# Patient Record
Sex: FEMALE | Race: BLACK OR AFRICAN AMERICAN | Employment: FULL TIME | URBAN - METROPOLITAN AREA
[De-identification: names, ages, dates, MRNs, and addresses within clinical notes are randomized per-mention and may not be internally consistent; named-entity substitution may affect disease eponyms.]

---

## 2017-08-14 PROBLEM — Z98.84 GASTRIC BYPASS STATUS FOR OBESITY: Status: ACTIVE | Noted: 2017-08-14

## 2018-08-12 ENCOUNTER — HOSPITAL ENCOUNTER (OUTPATIENT)
Age: 38
Discharge: HOME | End: 2018-08-12
Payer: COMMERCIAL

## 2018-08-12 DIAGNOSIS — K91.2 MALNUTRITION FOLLOWING GASTROINTESTINAL SURGERY: ICD-10-CM

## 2018-08-12 LAB
ALBUMIN SERPL-MCNC: 4.4 G/DL (ref 3.5–5.2)
ALP BLD-CCNC: 135 U/L (ref 35–104)
ALT SERPL-CCNC: 28 U/L (ref 0–32)
ANION GAP SERPL CALCULATED.3IONS-SCNC: 9 MMOL/L (ref 7–16)
AST SERPL-CCNC: 21 U/L (ref 0–31)
BILIRUB SERPL-MCNC: 0.4 MG/DL (ref 0–1.2)
BUN BLDV-MCNC: 11 MG/DL (ref 6–20)
CALCIUM SERPL-MCNC: 9.1 MG/DL (ref 8.6–10.2)
CHLORIDE BLD-SCNC: 108 MMOL/L (ref 98–107)
CHOLESTEROL, TOTAL: 145 MG/DL (ref 0–199)
CO2: 25 MMOL/L (ref 22–29)
CREAT SERPL-MCNC: 0.9 MG/DL (ref 0.5–1)
FERRITIN: 237 NG/ML
FOLATE: 3.7 NG/ML (ref 4.8–24.2)
GFR AFRICAN AMERICAN: >60
GFR NON-AFRICAN AMERICAN: >60 ML/MIN/1.73
GLUCOSE BLD-MCNC: 79 MG/DL (ref 74–109)
HCT VFR BLD CALC: 40.9 % (ref 34–48)
HEMOGLOBIN: 13.8 G/DL (ref 11.5–15.5)
MCH RBC QN AUTO: 29.6 PG (ref 26–35)
MCHC RBC AUTO-ENTMCNC: 33.7 % (ref 32–34.5)
MCV RBC AUTO: 87.8 FL (ref 80–99.9)
PDW BLD-RTO: 12.3 FL (ref 11.5–15)
PLATELET # BLD: 270 E9/L (ref 130–450)
PMV BLD AUTO: 9.9 FL (ref 7–12)
POTASSIUM SERPL-SCNC: 4.5 MMOL/L (ref 3.5–5)
RBC # BLD: 4.66 E12/L (ref 3.5–5.5)
SODIUM BLD-SCNC: 142 MMOL/L (ref 132–146)
TOTAL PROTEIN: 7.4 G/DL (ref 6.4–8.3)
TRIGL SERPL-MCNC: 57 MG/DL (ref 0–149)
VITAMIN B-12: 285 PG/ML (ref 211–946)
VITAMIN D 25-HYDROXY: 22 NG/ML (ref 30–100)
WBC # BLD: 3.4 E9/L (ref 4.5–11.5)

## 2018-08-12 PROCEDURE — 36415 COLL VENOUS BLD VENIPUNCTURE: CPT

## 2018-08-12 PROCEDURE — 82306 VITAMIN D 25 HYDROXY: CPT

## 2018-08-12 PROCEDURE — 84425 ASSAY OF VITAMIN B-1: CPT

## 2018-08-12 PROCEDURE — 82746 ASSAY OF FOLIC ACID SERUM: CPT

## 2018-08-12 PROCEDURE — 82728 ASSAY OF FERRITIN: CPT

## 2018-08-12 PROCEDURE — 82607 VITAMIN B-12: CPT

## 2018-08-12 PROCEDURE — 85027 COMPLETE CBC AUTOMATED: CPT

## 2018-08-12 PROCEDURE — 84478 ASSAY OF TRIGLYCERIDES: CPT

## 2018-08-12 PROCEDURE — 80053 COMPREHEN METABOLIC PANEL: CPT

## 2018-08-12 PROCEDURE — 82465 ASSAY BLD/SERUM CHOLESTEROL: CPT

## 2018-08-12 PROCEDURE — 84134 ASSAY OF PREALBUMIN: CPT

## 2018-08-12 PROCEDURE — 84630 ASSAY OF ZINC: CPT

## 2018-08-13 LAB — PREALBUMIN: 25 MG/DL (ref 20–40)

## 2018-08-15 ENCOUNTER — INITIAL CONSULT (OUTPATIENT)
Dept: BARIATRICS/WEIGHT MGMT | Age: 38
End: 2018-08-15
Payer: COMMERCIAL

## 2018-08-15 ENCOUNTER — OFFICE VISIT (OUTPATIENT)
Dept: BARIATRICS/WEIGHT MGMT | Age: 38
End: 2018-08-15
Payer: COMMERCIAL

## 2018-08-15 VITALS
BODY MASS INDEX: 37.9 KG/M2 | WEIGHT: 222 LBS | HEIGHT: 64 IN | TEMPERATURE: 98.6 F | HEART RATE: 84 BPM | RESPIRATION RATE: 20 BRPM | SYSTOLIC BLOOD PRESSURE: 148 MMHG | DIASTOLIC BLOOD PRESSURE: 94 MMHG

## 2018-08-15 VITALS
DIASTOLIC BLOOD PRESSURE: 94 MMHG | WEIGHT: 222 LBS | HEIGHT: 64 IN | RESPIRATION RATE: 20 BRPM | SYSTOLIC BLOOD PRESSURE: 148 MMHG | HEART RATE: 84 BPM | TEMPERATURE: 98.6 F | BODY MASS INDEX: 37.9 KG/M2

## 2018-08-15 DIAGNOSIS — K91.2 MALNUTRITION FOLLOWING GASTROINTESTINAL SURGERY: Primary | ICD-10-CM

## 2018-08-15 DIAGNOSIS — Z71.3 DIETARY COUNSELING: Primary | ICD-10-CM

## 2018-08-15 DIAGNOSIS — E55.9 VITAMIN D DEFICIENCY: ICD-10-CM

## 2018-08-15 LAB — ZINC: 73 UG/DL (ref 60–120)

## 2018-08-15 PROCEDURE — G8427 DOCREV CUR MEDS BY ELIG CLIN: HCPCS | Performed by: SURGERY

## 2018-08-15 PROCEDURE — 99999 PR OFFICE/OUTPT VISIT,PROCEDURE ONLY: CPT | Performed by: SURGERY

## 2018-08-15 PROCEDURE — 1036F TOBACCO NON-USER: CPT | Performed by: SURGERY

## 2018-08-15 PROCEDURE — 99214 OFFICE O/P EST MOD 30 MIN: CPT | Performed by: SURGERY

## 2018-08-15 PROCEDURE — 99212 OFFICE O/P EST SF 10 MIN: CPT

## 2018-08-15 PROCEDURE — G8417 CALC BMI ABV UP PARAM F/U: HCPCS | Performed by: SURGERY

## 2018-08-15 NOTE — PROGRESS NOTES
Judithann Dance - 1980  - 8/12/18 - Vitamin B1 and Zinc pending - Vitamin d 22L, Folate 3.7L    Note:  Patient was seen by Dr. [facility] on 8/15/18 and was diagnosed with a Vitamin D Deficiency. Patient stated he / she was taking the following supplements - Bariatric Advantage Multi-Vitamin 1 tablet daily pt instructed to take 1 tablet 2 times Daily, Bariatric Advantage 29 mgs Iron 1 tablet Daily, pt is not taking a calcium supplement, Dry Vitamin D2 50,000iu twice a week. The patient was instructed  that he / she needs to discontinue his / her current supplements and start the Bariatric approved Multi-Vitamins, Bariatric approved Iron, Vitamin D3 5,000iu daily and the Bariatric approved Calcium supplements and take the correct dose - pt scheduled with Dr. Edvin Florez for deficieny treatment. Patient was also  provided a list of correct Bariatric Multi-Vitamin's, Bariatric approved Iron, Vitamin D3 5,000iu daily and the correct Bariatric Calcium supplements with correct dosing. Thanks for allowing us to participate in your patient's care.   Dr. [facility]  [facility]  Phone: [phone]  Fax: [phone]  Faxed Via Epic to PCP

## 2018-08-15 NOTE — PATIENT INSTRUCTIONS
Vitamin D2 50,000iu twice a week. The patient was instructed  that he / she needs to discontinue his / her current supplements and start the Bariatric approved Multi-Vitamins, Bariatric approved Iron, Vitamin D3 5,000iu daily and the Bariatric approved Calcium supplements and take the correct dose - pt scheduled with Dr. Giancarlo Jerez for deficieny treatment. Patient was also  provided a list of correct Bariatric Multi-Vitamin's, Bariatric approved Iron, Vitamin D3 5,000iu daily and the correct Bariatric Calcium supplements with correct dosing. Thanks for allowing us to participate in your patient's care. Dr. Haydee Sánchez and [facility]  Phone: [phone]  Fax: [phone]  Faxed Via Epic to PCP        Vitamin and Supplement Instruction:  Vitamin and Mineral Supplementation   After Gastric Bypass and Sleeve Gastrectomy Surgery      Patient is able to verbalize the above supplements must be changed or added to the diet in order to insure proper health and nutrition, and prevent nutrient deficiencies. The patient is aware that not complying can lead to the patient placing him/her self at risk for complications. RD / LD reviewed with patient the importance of dietary supplements. Pt. verbalized understanding. Vitamins: Please select one and  take the following supplements and correct dose daily listed below     OptiSource chewable vitamin and mineral supplement, 1 tablet 4 times a day. ([facility])    Bariatric Fusion  1 tablet 4 times daily ( BariatricFusion. [employer] or [phone])    Celebrate Chewable Multi-Vitamin (1 tablet 2 times daily)  and  Celebrate Iron  30 mg +  Vitamin C (1 tablet daily)    Bariatric Advantage Chewable Multi-Formula (1 tablet 2 times daily)  and  Bariatric Advantage Chewable Iron 29 mg (1 tablet daily)    Other Vitamin Deficiencies: Please take the following supplements listed below      Vitamin D  Dry Vitamin D3 5,000iu every day.  -

## 2018-08-15 NOTE — PATIENT INSTRUCTIONS
Please continue to take your vitamin and mineral supplements as instructed. If you received a blood work prescription today for laboratory monitoring due prior to your next routine follow-up visit, please have this blood work obtained 10 to 14 days prior to your next visit. It is important to fast for 12 hours prior to routine weight loss surgery blood work, EXCEPT for drinking water, to ensure accuracy of results. Please report nausea, vomiting, abdominal pain, or any other problems you experience to your surgeon. For problems related to weight loss surgery, it is best to go to [address] [place] [address] Emergency Department and have your surgeon paged.

## 2018-08-17 LAB — VITAMIN B1 WHOLE BLOOD: 88 NMOL/L (ref 70–180)

## 2019-03-13 ENCOUNTER — TELEPHONE (OUTPATIENT)
Dept: BARIATRICS/WEIGHT MGMT | Age: 39
End: 2019-03-13

## 2019-03-19 ENCOUNTER — HOSPITAL ENCOUNTER (EMERGENCY)
Age: 39
Discharge: HOME | End: 2019-03-19
Attending: EMERGENCY MEDICINE
Payer: COMMERCIAL

## 2019-03-19 VITALS
RESPIRATION RATE: 18 BRPM | BODY MASS INDEX: 37.76 KG/M2 | TEMPERATURE: 98.1 F | DIASTOLIC BLOOD PRESSURE: 97 MMHG | SYSTOLIC BLOOD PRESSURE: 140 MMHG | WEIGHT: 220 LBS | HEART RATE: 80 BPM | OXYGEN SATURATION: 96 %

## 2019-03-19 DIAGNOSIS — L03.113 CELLULITIS OF RIGHT UPPER EXTREMITY: Primary | ICD-10-CM

## 2019-03-19 PROCEDURE — 99283 EMERGENCY DEPT VISIT LOW MDM: CPT

## 2019-03-19 RX ORDER — METHYLPREDNISOLONE 4 MG/1
TABLET ORAL
Qty: 1 KIT | Refills: 0 | Status: SHIPPED | OUTPATIENT
Start: 2019-03-19 | End: 2019-03-25

## 2019-03-19 RX ORDER — DOXYCYCLINE 100 MG/1
100 CAPSULE ORAL 2 TIMES DAILY
Qty: 20 CAPSULE | Refills: 0 | Status: SHIPPED | OUTPATIENT
Start: 2019-03-19 | End: 2019-07-11

## 2019-04-12 ENCOUNTER — HOSPITAL ENCOUNTER (OUTPATIENT)
Age: 39
Discharge: HOME | End: 2019-04-12
Payer: COMMERCIAL

## 2019-04-12 DIAGNOSIS — K91.2 MALNUTRITION FOLLOWING GASTROINTESTINAL SURGERY: ICD-10-CM

## 2019-04-12 LAB
ALBUMIN SERPL-MCNC: 4 G/DL (ref 3.5–5.2)
ALP BLD-CCNC: 129 U/L (ref 35–104)
ALT SERPL-CCNC: 19 U/L (ref 0–32)
ANION GAP SERPL CALCULATED.3IONS-SCNC: 10 MMOL/L (ref 7–16)
AST SERPL-CCNC: 18 U/L (ref 0–31)
BILIRUB SERPL-MCNC: 0.4 MG/DL (ref 0–1.2)
BUN BLDV-MCNC: 11 MG/DL (ref 6–20)
CALCIUM SERPL-MCNC: 8.7 MG/DL (ref 8.6–10.2)
CHLORIDE BLD-SCNC: 105 MMOL/L (ref 98–107)
CHOLESTEROL, TOTAL: 144 MG/DL (ref 0–199)
CO2: 25 MMOL/L (ref 22–29)
CREAT SERPL-MCNC: 0.7 MG/DL (ref 0.5–1)
FERRITIN: 199 NG/ML
FOLATE: >20 NG/ML (ref 4.8–24.2)
GFR AFRICAN AMERICAN: >60
GFR NON-AFRICAN AMERICAN: >60 ML/MIN/1.73
GLUCOSE BLD-MCNC: 79 MG/DL (ref 74–99)
HCT VFR BLD CALC: 37.4 % (ref 34–48)
HEMOGLOBIN: 12.5 G/DL (ref 11.5–15.5)
MCH RBC QN AUTO: 29.1 PG (ref 26–35)
MCHC RBC AUTO-ENTMCNC: 33.4 % (ref 32–34.5)
MCV RBC AUTO: 87.2 FL (ref 80–99.9)
PDW BLD-RTO: 11.6 FL (ref 11.5–15)
PLATELET # BLD: 298 E9/L (ref 130–450)
PMV BLD AUTO: 9.4 FL (ref 7–12)
POTASSIUM SERPL-SCNC: 3.9 MMOL/L (ref 3.5–5)
PREALBUMIN: 29 MG/DL (ref 20–40)
RBC # BLD: 4.29 E12/L (ref 3.5–5.5)
SODIUM BLD-SCNC: 140 MMOL/L (ref 132–146)
TOTAL PROTEIN: 6.6 G/DL (ref 6.4–8.3)
TRIGL SERPL-MCNC: 53 MG/DL (ref 0–149)
VITAMIN B-12: 285 PG/ML (ref 211–946)
VITAMIN D 25-HYDROXY: 14 NG/ML (ref 30–100)
WBC # BLD: 4.1 E9/L (ref 4.5–11.5)

## 2019-04-12 PROCEDURE — 80053 COMPREHEN METABOLIC PANEL: CPT

## 2019-04-12 PROCEDURE — 82746 ASSAY OF FOLIC ACID SERUM: CPT

## 2019-04-12 PROCEDURE — 82728 ASSAY OF FERRITIN: CPT

## 2019-04-12 PROCEDURE — 84630 ASSAY OF ZINC: CPT

## 2019-04-12 PROCEDURE — 84425 ASSAY OF VITAMIN B-1: CPT

## 2019-04-12 PROCEDURE — 85027 COMPLETE CBC AUTOMATED: CPT

## 2019-04-12 PROCEDURE — 36415 COLL VENOUS BLD VENIPUNCTURE: CPT

## 2019-04-12 PROCEDURE — 82306 VITAMIN D 25 HYDROXY: CPT

## 2019-04-12 PROCEDURE — 82465 ASSAY BLD/SERUM CHOLESTEROL: CPT

## 2019-04-12 PROCEDURE — 82607 VITAMIN B-12: CPT

## 2019-04-12 PROCEDURE — 84478 ASSAY OF TRIGLYCERIDES: CPT

## 2019-04-12 PROCEDURE — 84134 ASSAY OF PREALBUMIN: CPT

## 2019-04-17 LAB — ZINC: 77 UG/DL (ref 60–120)

## 2019-04-18 LAB — VITAMIN B1 WHOLE BLOOD: 81 NMOL/L (ref 70–180)

## 2019-05-01 ENCOUNTER — OFFICE VISIT (OUTPATIENT)
Dept: BARIATRICS/WEIGHT MGMT | Age: 39
End: 2019-05-01
Payer: COMMERCIAL

## 2019-05-01 VITALS
SYSTOLIC BLOOD PRESSURE: 188 MMHG | WEIGHT: 233 LBS | HEIGHT: 64 IN | TEMPERATURE: 97.5 F | HEART RATE: 74 BPM | BODY MASS INDEX: 39.78 KG/M2 | RESPIRATION RATE: 20 BRPM | DIASTOLIC BLOOD PRESSURE: 97 MMHG

## 2019-05-01 DIAGNOSIS — R10.13 EPIGASTRIC PAIN: Primary | ICD-10-CM

## 2019-05-01 PROCEDURE — 99211 OFF/OP EST MAY X REQ PHY/QHP: CPT

## 2019-05-01 PROCEDURE — 1036F TOBACCO NON-USER: CPT | Performed by: SURGERY

## 2019-05-01 PROCEDURE — G8417 CALC BMI ABV UP PARAM F/U: HCPCS | Performed by: SURGERY

## 2019-05-01 PROCEDURE — 99213 OFFICE O/P EST LOW 20 MIN: CPT | Performed by: SURGERY

## 2019-05-01 PROCEDURE — G8427 DOCREV CUR MEDS BY ELIG CLIN: HCPCS | Performed by: SURGERY

## 2019-05-01 RX ORDER — FLUTICASONE PROPIONATE 50 MCG
SPRAY, SUSPENSION (ML) NASAL PRN
Refills: 6 | COMMUNITY
Start: 2019-03-04

## 2019-05-01 RX ORDER — LORATADINE/PSEUDOEPHEDRINE 10MG-240MG
TABLET, EXTENDED RELEASE 24 HR ORAL
Refills: 0 | COMMUNITY
Start: 2019-03-20 | End: 2021-03-09

## 2019-05-01 NOTE — PROGRESS NOTES
Sigifredo Median  5/1/2019  [address]     Shannon-en- Y Gastric Bypass  1.5 Year Post-Operative Follow-up     Sigifredo Briseno is a 43 y.o. female who is 1.5 years post Laparoscopic Shannon-en-Y Gastric Bypass surgery. Reports no issues. She is not having swallowing difficulty, is noncompliant some of the time with the multivitamins and calcium + Vit D. She is meeting fluid recommendations of at least 64 ounces per day and is meeting protein recommendations. She  is not exercising: no regular exercise. Weight=233 lb (105.7 kg)  Today's weight represents a total weight loss of 82 pounds since surgery with 19 pounds regained since the lowest weight. Prior to Admission medications    Medication Sig Start Date End Date Taking?  Authorizing Provider   LORATADINE-D 24HR  MG per extended release tablet TAKE ONE TABLET BY MOUTH EVERY DAY AS NEEDED 3/20/19  Yes Historical Provider, MD   fluticasone (FLONASE) 50 MCG/ACT nasal spray as needed 3/4/19  Yes Historical Provider, MD   doxycycline monohydrate (MONODOX) 100 MG capsule Take 1 capsule by mouth 2 times daily 3/19/19  Yes Anthony Hobson MD   Multiple Vitamins-Minerals (THERAPEUTIC MULTIVITAMIN-MINERALS) tablet Take 1 tablet by mouth 2 times daily Indications: bariatric advantage   Yes Historical Provider, MD   Calcium-Iron-Vit D-Vit K (CALCIUM SOFT CHEWS) [id number] MG-UNT-MCG CHEW Take by mouth 3 times daily   Yes Historical Provider, MD   diclofenac (CATAFLAM) 50 MG tablet Take 50 mg by mouth 3 times daily   Yes Historical Provider, MD   acetaminophen (APAP EXTRA STRENGTH) 500 MG tablet Take 1 tablet by mouth every 6 hours as needed for Pain 5/31/15  Yes Arvind Lopez,    Cholecalciferol (VITAMIN D3) 5000 units TABS Take by mouth daily    Historical Provider, MD   omeprazole (PRILOSEC) 20 MG delayed release capsule Take 1 capsule by mouth Daily  Patient taking differently: Take 20 mg by mouth as needed  8/2/17 8/15/18 Ariela Rahman MD   Cholecalciferol (VITAMIN D3) 89491 UNITS CAPS Take by mouth Twice a Week     Historical Provider, MD          Physical exam:   BP (!) 188/97 (Site: Left Lower Arm, Position: Sitting, Cuff Size: Medium Adult)   Pulse 74   Temp 97.5 °F (36.4 °C) (Temporal)   Resp 20   Ht 5' 4\" (1.626 m)   Wt 233 lb (105.7 kg)   BMI 39.99 kg/m²    General appearance: alert, appears stated age and cooperative  Head: Normocephalic, without obvious abnormality, atraumatic  Neck: no adenopathy, no carotid bruit, no JVD, supple, symmetrical, trachea midline and thyroid not enlarged, symmetric, no tenderness/mass/nodules  Lungs: clear to auscultation bilaterally  Heart: regular rate and rhythm  Abdomen: soft, non-tender; bowel sounds normal; no masses,  no organomegaly  Extremities: extremities normal, atraumatic, no cyanosis or edema    Assessment: Post Shannon-en- Y Gastric Bypass. She does not complain of GERD,  does not have sleep apnea,  does not have diabetes,  does not have hypertension off medical treatment. Cholesterol and triglycerides are normal. Low vit d    Plan:  Get back on diet, supplement vit d. Continue to eat a high protein, low calorie diet, eat small portions very slowly and chew well before swallowing. Drink plenty of water and fluids. Make sure to use fiber to keep the bowels regular. Try to exercise 7 days per week, maintain adequate variety and balance. Always notify the clinic if you have any medical problems. Follow up in 6 months.       Physician Signature: Electronically signed by Dr. Ariela Rahman MD

## 2019-05-01 NOTE — PATIENT INSTRUCTIONS
Please continue to take your vitamin and mineral supplements as instructed. If you received a blood work prescription today for laboratory monitoring due prior to your next routine follow-up visit, please have this blood work obtained 10 to 14 days prior to your next visit. It is important to fast for 12 hours prior to routine weight loss surgery blood work, EXCEPT for drinking water, to ensure accuracy of results. Please report nausea, vomiting, abdominal pain, or any other problems you experience to your surgeon. For problems related to weight loss surgery, it is best to go to [address] [place] [address] Emergency Department and have your surgeon paged.

## 2019-07-11 ENCOUNTER — HOSPITAL ENCOUNTER (EMERGENCY)
Age: 39
Discharge: HOME | End: 2019-07-11
Attending: FAMILY MEDICINE
Payer: COMMERCIAL

## 2019-07-11 VITALS
HEART RATE: 86 BPM | BODY MASS INDEX: 38.62 KG/M2 | SYSTOLIC BLOOD PRESSURE: 152 MMHG | OXYGEN SATURATION: 97 % | RESPIRATION RATE: 16 BRPM | WEIGHT: 225 LBS | TEMPERATURE: 98.6 F | DIASTOLIC BLOOD PRESSURE: 100 MMHG

## 2019-07-11 DIAGNOSIS — M17.0 PRIMARY OSTEOARTHRITIS OF BOTH KNEES: Primary | ICD-10-CM

## 2019-07-11 PROCEDURE — 96372 THER/PROPH/DIAG INJ SC/IM: CPT

## 2019-07-11 PROCEDURE — 99282 EMERGENCY DEPT VISIT SF MDM: CPT

## 2019-07-11 PROCEDURE — 6360000002 HC RX W HCPCS: Performed by: FAMILY MEDICINE

## 2019-07-11 RX ORDER — DEXAMETHASONE SODIUM PHOSPHATE 10 MG/ML
10 INJECTION, SOLUTION INTRAMUSCULAR; INTRAVENOUS ONCE
Status: COMPLETED | OUTPATIENT
Start: 2019-07-11 | End: 2019-07-11

## 2019-07-11 RX ORDER — PREDNISONE 10 MG/1
20 TABLET ORAL 2 TIMES DAILY
Qty: 6 TABLET | Refills: 0 | Status: SHIPPED | OUTPATIENT
Start: 2019-07-12 | End: 2019-07-16

## 2019-07-11 RX ADMIN — DEXAMETHASONE SODIUM PHOSPHATE 10 MG: 10 INJECTION INTRAMUSCULAR; INTRAVENOUS at 16:08

## 2019-07-11 ASSESSMENT — PAIN DESCRIPTION - LOCATION: LOCATION: KNEE

## 2019-07-11 ASSESSMENT — PAIN SCALES - GENERAL: PAINLEVEL_OUTOF10: 10

## 2019-07-11 ASSESSMENT — PAIN DESCRIPTION - FREQUENCY: FREQUENCY: CONTINUOUS

## 2019-07-11 ASSESSMENT — PAIN DESCRIPTION - DESCRIPTORS: DESCRIPTORS: SHARP;THROBBING

## 2019-07-11 ASSESSMENT — PAIN DESCRIPTION - PROGRESSION
CLINICAL_PROGRESSION: NOT CHANGED
CLINICAL_PROGRESSION: NOT CHANGED

## 2019-07-11 ASSESSMENT — PAIN DESCRIPTION - PAIN TYPE: TYPE: ACUTE PAIN

## 2019-07-11 ASSESSMENT — PAIN DESCRIPTION - ORIENTATION: ORIENTATION: LEFT

## 2019-09-24 DIAGNOSIS — M25.562 PAIN IN BOTH KNEES, UNSPECIFIED CHRONICITY: Primary | ICD-10-CM

## 2019-09-24 DIAGNOSIS — M25.561 PAIN IN BOTH KNEES, UNSPECIFIED CHRONICITY: Primary | ICD-10-CM

## 2019-10-21 ENCOUNTER — OFFICE VISIT (OUTPATIENT)
Dept: ORTHOPEDIC SURGERY | Age: 39
End: 2019-10-21
Payer: COMMERCIAL

## 2019-10-21 ENCOUNTER — TELEPHONE (OUTPATIENT)
Dept: BARIATRICS/WEIGHT MGMT | Age: 39
End: 2019-10-21

## 2019-10-21 VITALS — BODY MASS INDEX: 38.43 KG/M2 | WEIGHT: 225.09 LBS | HEIGHT: 64 IN

## 2019-10-21 DIAGNOSIS — M25.562 LEFT KNEE PAIN, UNSPECIFIED CHRONICITY: ICD-10-CM

## 2019-10-21 DIAGNOSIS — M17.0 ARTHRITIS OF BOTH KNEES: ICD-10-CM

## 2019-10-21 DIAGNOSIS — M25.561 PAIN IN BOTH KNEES, UNSPECIFIED CHRONICITY: Primary | ICD-10-CM

## 2019-10-21 DIAGNOSIS — M25.562 PAIN IN BOTH KNEES, UNSPECIFIED CHRONICITY: Primary | ICD-10-CM

## 2019-10-21 PROCEDURE — 99203 OFFICE O/P NEW LOW 30 MIN: CPT | Performed by: ORTHOPAEDIC SURGERY

## 2019-10-21 PROCEDURE — G8427 DOCREV CUR MEDS BY ELIG CLIN: HCPCS | Performed by: ORTHOPAEDIC SURGERY

## 2019-10-21 PROCEDURE — G8484 FLU IMMUNIZE NO ADMIN: HCPCS | Performed by: ORTHOPAEDIC SURGERY

## 2019-10-21 PROCEDURE — G8417 CALC BMI ABV UP PARAM F/U: HCPCS | Performed by: ORTHOPAEDIC SURGERY

## 2019-10-21 PROCEDURE — 1036F TOBACCO NON-USER: CPT | Performed by: ORTHOPAEDIC SURGERY

## 2019-10-28 ENCOUNTER — TELEPHONE (OUTPATIENT)
Dept: BARIATRICS/WEIGHT MGMT | Age: 39
End: 2019-10-28

## 2019-11-14 ENCOUNTER — HOSPITAL ENCOUNTER (OUTPATIENT)
Age: 39
Discharge: HOME | End: 2019-11-14
Payer: COMMERCIAL

## 2019-11-14 DIAGNOSIS — R10.13 EPIGASTRIC PAIN: ICD-10-CM

## 2019-11-14 LAB
ALBUMIN SERPL-MCNC: 4.3 G/DL (ref 3.5–5.2)
ALP BLD-CCNC: 123 U/L (ref 35–104)
ALT SERPL-CCNC: 20 U/L (ref 0–32)
ANION GAP SERPL CALCULATED.3IONS-SCNC: 10 MMOL/L (ref 7–16)
AST SERPL-CCNC: 19 U/L (ref 0–31)
BILIRUB SERPL-MCNC: 0.4 MG/DL (ref 0–1.2)
BUN BLDV-MCNC: 12 MG/DL (ref 6–20)
CALCIUM SERPL-MCNC: 9.1 MG/DL (ref 8.6–10.2)
CHLORIDE BLD-SCNC: 107 MMOL/L (ref 98–107)
CHOLESTEROL, TOTAL: 151 MG/DL (ref 0–199)
CO2: 24 MMOL/L (ref 22–29)
CREAT SERPL-MCNC: 0.8 MG/DL (ref 0.5–1)
FERRITIN: 238 NG/ML
FOLATE: 13.5 NG/ML (ref 4.8–24.2)
GFR AFRICAN AMERICAN: >60
GFR NON-AFRICAN AMERICAN: >60 ML/MIN/1.73
GLUCOSE BLD-MCNC: 89 MG/DL (ref 74–99)
HCT VFR BLD CALC: 42.3 % (ref 34–48)
HEMOGLOBIN: 14.3 G/DL (ref 11.5–15.5)
MCH RBC QN AUTO: 29.4 PG (ref 26–35)
MCHC RBC AUTO-ENTMCNC: 33.8 % (ref 32–34.5)
MCV RBC AUTO: 86.9 FL (ref 80–99.9)
PDW BLD-RTO: 11.6 FL (ref 11.5–15)
PLATELET # BLD: 240 E9/L (ref 130–450)
PMV BLD AUTO: 10.4 FL (ref 7–12)
POTASSIUM SERPL-SCNC: 4 MMOL/L (ref 3.5–5)
PREALBUMIN: 32 MG/DL (ref 20–40)
RBC # BLD: 4.87 E12/L (ref 3.5–5.5)
SODIUM BLD-SCNC: 141 MMOL/L (ref 132–146)
TOTAL PROTEIN: 7.3 G/DL (ref 6.4–8.3)
TRIGL SERPL-MCNC: 61 MG/DL (ref 0–149)
VITAMIN B-12: 247 PG/ML (ref 211–946)
VITAMIN D 25-HYDROXY: 16 NG/ML (ref 30–100)
WBC # BLD: 6.2 E9/L (ref 4.5–11.5)

## 2019-11-14 PROCEDURE — 82746 ASSAY OF FOLIC ACID SERUM: CPT

## 2019-11-14 PROCEDURE — 80053 COMPREHEN METABOLIC PANEL: CPT

## 2019-11-14 PROCEDURE — 85027 COMPLETE CBC AUTOMATED: CPT

## 2019-11-14 PROCEDURE — 82465 ASSAY BLD/SERUM CHOLESTEROL: CPT

## 2019-11-14 PROCEDURE — 84478 ASSAY OF TRIGLYCERIDES: CPT

## 2019-11-14 PROCEDURE — 36415 COLL VENOUS BLD VENIPUNCTURE: CPT

## 2019-11-14 PROCEDURE — 84630 ASSAY OF ZINC: CPT

## 2019-11-14 PROCEDURE — 84425 ASSAY OF VITAMIN B-1: CPT

## 2019-11-14 PROCEDURE — 84134 ASSAY OF PREALBUMIN: CPT

## 2019-11-14 PROCEDURE — 82306 VITAMIN D 25 HYDROXY: CPT

## 2019-11-14 PROCEDURE — 82728 ASSAY OF FERRITIN: CPT

## 2019-11-14 PROCEDURE — 82607 VITAMIN B-12: CPT

## 2019-11-16 LAB — ZINC: 70.1 UG/DL (ref 60–120)

## 2019-11-17 LAB — VITAMIN B1 WHOLE BLOOD: 107 NMOL/L (ref 70–180)

## 2019-12-04 ENCOUNTER — TELEPHONE (OUTPATIENT)
Dept: BARIATRICS/WEIGHT MGMT | Age: 39
End: 2019-12-04

## 2020-02-03 ENCOUNTER — OFFICE VISIT (OUTPATIENT)
Dept: ORTHOPEDIC SURGERY | Age: 40
End: 2020-02-03
Payer: COMMERCIAL

## 2020-02-03 VITALS — HEIGHT: 64 IN | BODY MASS INDEX: 38.43 KG/M2 | WEIGHT: 225.09 LBS

## 2020-02-03 PROCEDURE — 1036F TOBACCO NON-USER: CPT | Performed by: ORTHOPAEDIC SURGERY

## 2020-02-03 PROCEDURE — G8417 CALC BMI ABV UP PARAM F/U: HCPCS | Performed by: ORTHOPAEDIC SURGERY

## 2020-02-03 PROCEDURE — G8427 DOCREV CUR MEDS BY ELIG CLIN: HCPCS | Performed by: ORTHOPAEDIC SURGERY

## 2020-02-03 PROCEDURE — 99214 OFFICE O/P EST MOD 30 MIN: CPT | Performed by: ORTHOPAEDIC SURGERY

## 2020-02-03 PROCEDURE — G8484 FLU IMMUNIZE NO ADMIN: HCPCS | Performed by: ORTHOPAEDIC SURGERY

## 2020-02-03 RX ORDER — ALBUTEROL SULFATE 90 UG/1
AEROSOL, METERED RESPIRATORY (INHALATION)
COMMUNITY
Start: 2020-01-12

## 2020-02-03 RX ORDER — TIZANIDINE 2 MG/1
TABLET ORAL
COMMUNITY
Start: 2020-01-12 | End: 2020-10-08

## 2020-02-03 RX ORDER — LISINOPRIL 5 MG/1
10 TABLET ORAL
COMMUNITY
Start: 2020-01-12 | End: 2021-03-09

## 2020-02-03 NOTE — PROGRESS NOTES
UPPER GASTROINTESTINAL ENDOSCOPY  08/12/2016    Dr. Taco Candelaria EXTRACTION         Allergies   Allergen Reactions    Ibuprofen Swelling       Social History     Socioeconomic History    Marital status: Single     Spouse name: None    Number of children: None    Years of education: None    Highest education level: None   Occupational History    None   Social Needs    Financial resource strain: None    Food insecurity:     Worry: None     Inability: None    Transportation needs:     Medical: None     Non-medical: None   Tobacco Use    Smoking status: Former Smoker     Types: Cigarettes    Smokeless tobacco: Never Used   Substance and Sexual Activity    Alcohol use: No    Drug use: No    Sexual activity: Yes     Partners: Male   Lifestyle    Physical activity:     Days per week: None     Minutes per session: None    Stress: None   Relationships    Social connections:     Talks on phone: None     Gets together: None     Attends Jainism service: None     Active member of club or organization: None     Attends meetings of clubs or organizations: None     Relationship status: None    Intimate partner violence:     Fear of current or ex partner: None     Emotionally abused: None     Physically abused: None     Forced sexual activity: None   Other Topics Concern    None   Social History Narrative    None       Review of Systems  As follows except as previously noted in HPI:  Constitutional: Negative for chills, diaphoresis, fatigue, fever and unexpected weight change. Respiratory: Negative for cough, shortness of breath and wheezing. Cardiovascular: Negative for chest pain and palpitations. Neurological: Negative for dizziness, syncope, cephalgia. GI / : negative  Musculoskeletal: see HPI       Objective:   Physical Exam   Constitutional: Oriented to person, place, and time. and appears well-developed and well-nourished. :   Head: Normocephalic and atraumatic.    Eyes: EOM are normal.   Neck: Neck supple. Cardiovascular: Normal rate and regular rhythm. Pulmonary/Chest: Effort normal. No stridor. No respiratory distress, no wheezes. Abdominal:  No abnormal distension. Neurological: Alert and oriented to person, place, and time. Skin: Skin is warm and dry. Psychiatric: Normal mood and affect.  Behavior is normal. Thought content normal.    CELESTINO Palomo, DO    2/3/20  4:38 PM

## 2020-02-05 ENCOUNTER — INITIAL CONSULT (OUTPATIENT)
Dept: BARIATRICS/WEIGHT MGMT | Age: 40
End: 2020-02-05
Payer: COMMERCIAL

## 2020-02-05 ENCOUNTER — OFFICE VISIT (OUTPATIENT)
Dept: BARIATRICS/WEIGHT MGMT | Age: 40
End: 2020-02-05
Payer: COMMERCIAL

## 2020-02-05 VITALS
DIASTOLIC BLOOD PRESSURE: 102 MMHG | WEIGHT: 241 LBS | TEMPERATURE: 97.6 F | HEART RATE: 83 BPM | SYSTOLIC BLOOD PRESSURE: 162 MMHG | BODY MASS INDEX: 41.15 KG/M2 | RESPIRATION RATE: 20 BRPM | HEIGHT: 64 IN

## 2020-02-05 PROCEDURE — 99213 OFFICE O/P EST LOW 20 MIN: CPT | Performed by: SURGERY

## 2020-02-05 PROCEDURE — G8417 CALC BMI ABV UP PARAM F/U: HCPCS | Performed by: SURGERY

## 2020-02-05 PROCEDURE — G8484 FLU IMMUNIZE NO ADMIN: HCPCS | Performed by: SURGERY

## 2020-02-05 PROCEDURE — G8427 DOCREV CUR MEDS BY ELIG CLIN: HCPCS | Performed by: SURGERY

## 2020-02-05 PROCEDURE — 99212 OFFICE O/P EST SF 10 MIN: CPT

## 2020-02-05 PROCEDURE — 1036F TOBACCO NON-USER: CPT | Performed by: SURGERY

## 2020-02-05 PROCEDURE — 99999 PR OFFICE/OUTPT VISIT,PROCEDURE ONLY: CPT | Performed by: DIETITIAN, REGISTERED

## 2020-02-05 NOTE — PATIENT INSTRUCTIONS
[facility]  Dietary Follow-up Appointment Instructions    Caterina Melissa  -Pt referred to Dr. Lydia Elizondo for Vitamin D Def tx  Date: 2/5/2020     The RD / LD reviewed the following instructions with the patient and handouts have been given. Pt. is able to verbalize instruction and has been instructed to call with any problems or complications. If patient is not able to comply with the dietary advancement or dietary or supplement instructions given pt. is instructed to call the [facility] at [phone]. If [facility] is closed and problems occur patient is instructed to go to [address] [place] [address] Emergency Department and have his / her surgeon paged. Caterina Melissa - 1980 4/12/19 - Vitamin D 14L, 11/14/19 - Vitamin D 16L    Note:  Patient was seen by Dr. Ela Henning on 2/5/20 and was diagnosed with a Vitamin D Deficiency. Patient stated he / she was taking the following supplements  - no bariatric supplements PCP for the last two years has prescribed Vitamin D2 50,000iu twice a week. The patient was instructed  that he / she needs to start the Bariatric approved Multi-Vitamins, Bariatric approved Iron, Vitamin D3 5,000iu daily and the Bariatric approved Calcium supplements - pt scheduled with Dr. Lydia Elizondo for deficieny treatment. Patient was also  provided a list of correct Bariatric Multi-Vitamin's, Bariatric approved Iron, Vitamin D3 5,000iu daily and the correct Bariatric Calcium supplements with correct dosing. Thanks for allowing us to participate in your patient's care. Dr. Ela Henning  [facility]  Phone: [phone]  Fax: [phone]  Faxed Via Epic to PCP      Chart given to Nomi Smyth to schedule the pt.      Current Diet Instruction: Handouts Given    Bariatric 1200 calorie diet, SAURABH, heart healthy, 60-80 gram protein - MVI, Calcium and Protein Supplements        Care Plan:  Vitamin and Mineral Supplementation self-synthesized with sunlight. Vitamin D is also found in fortified milk, fortified margarine, egg yolks, liver and fatty fish. What occurs when you are deficient in Vitamin D? Bones and Teeth:  Liliana Lauri can occur with Vitamin D deficiency, which is known as softening of the bones and teeth. Due to the softening effect we can see deformities of the limbs, spine, thorax and pelvis. Also seen is pain in the pelvis, lower back and legs, including increase risk to bone fractures. Blood:  Vitamin D deficiency can cause decreased calcium and/or phosphorus in the blood and increased alkaline phosphatase. Nervous/Muscular Systems:  Vitamin D deficiency can cause lax muscles resulting in protrusion of the abdomen (Pot Belly) and muscle spasms. Some patients complain of involuntary twitching and  muscle spasms. Excretory System:  Vitamin D deficiency can cause increased calcium in the stool and decreased calcium in the urine. Other:  Vitamin D deficiency can cause abnormally high secretions of parathormone, which is why we run lab work at 1 year to make sure the calcium supplements you are taking are being absorbed correctly. Vitamin D in Commonly Eaten Foods Ranked Richest to [employer]:      Food Serving Size IU of   Vitamin D----------        Herring, fresh, raw,  1 oz. 255 IU   [place] 1 oz. 142 IU   Milk,Cows Fortified 1 Cup 100 IU   Sardines, canned 1 oz. 85 IU   Chicken-Liver, Cooked 3oz. 45 IU   Shrimp, Canned 1oz. 30 IU   Egg Yolk 1 25 IU   Calf-Liver, Cooked 3oz. 12 IU   Cream, Light 1 T 8 IU   Cheddar Cheese 1 oz. 3 IU   Oysters 4 3 IU   Butter  1 tsp 1.4 IU            Unfortunately after RYGB surgery you will not be able to increase your Vitamin D with diet alone. A Vitamin D supplement will be needed in order to improve Vitamin D lab values.

## 2020-02-05 NOTE — PROGRESS NOTES
Blaze Daniels - 1980 4/12/19 - Vitamin D 14L, 11/14/19 - Vitamin D 16L    Note:  Patient was seen by Dr. Juan Grover on 2/5/20 and was diagnosed with a Vitamin D Deficiency. Patient stated he / she was taking the following supplements  - no bariatric supplements PCP for the last two years has prescribed Vitamin D2 50,000iu twice a week. The patient was instructed  that he / she needs to start the Bariatric approved Multi-Vitamins, Bariatric approved Iron, Vitamin D3 5,000iu daily and the Bariatric approved Calcium supplements - pt scheduled with Dr. Yulissa Bright for deficiency treatment. Patient was also  provided a list of correct Bariatric Multi-Vitamin's, Bariatric approved Iron, Vitamin D3 5,000iu daily and the correct Bariatric Calcium supplements with correct dosing. Thanks for allowing us to participate in your patient's care. Dr. Juan Grover  [facility]  Phone: [phone]  Fax: [phone]  Faxed Via Epic to PCP      Chart given to [facility] to schedule the pt.

## 2020-02-05 NOTE — PROGRESS NOTES
Robles Hurst  2/5/2020  Awanda Part    Shannon-en- Y Gastric Bypass  2 Year Post-Operative Follow-up     Robles Hurst is a 35 y.o. female who is 2 years post Laparoscopicn Shannon-en-Y Gastric Bypass surgery. Reports no issues just some wt regain she states is because of stress. She is not having swallowing difficulty, is noncompliant some of the time with the multivitamins and calcium + Vit D. She is meeting fluid recommendations of at least 64 ounces per day and is meeting protein recommendations. She  is not exercising: no regular exercise. Weight=241 lb (109.3 kg)  Today's weight represents a total weight loss of 74 pounds since surgery with 57 pounds regained since the lowest weight. Prior to Admission medications    Medication Sig Start Date End Date Taking? Authorizing Provider   tiZANidine (ZANAFLEX) 2 MG tablet TAKE 1 TABLET BY MOUTH TWICE A DAY AS NEEDED 1/12/20  Yes Historical Provider, MD   lisinopril (PRINIVIL;ZESTRIL) 5 MG tablet TAKE 1 TABLET BY MOUTH EVERY DAY 1/12/20  Yes Historical Provider, MD   albuterol sulfate  (90 Base) MCG/ACT inhaler TAKE 1 PUFF INHALED 4 TIMES PER DAY TAKE AS NEEDED 1/12/20  Yes Historical Provider, MD   Diclofenac Sodium  MG TB24 TAKE 1 TABLET, 1 TIME PER DAY.  WITH FOOD 11/19/19  Yes Historical Provider, MD   LORATADINE-D 24HR  MG per extended release tablet TAKE ONE TABLET BY MOUTH EVERY DAY AS NEEDED 3/20/19  Yes Historical Provider, MD   fluticasone (FLONASE) 50 MCG/ACT nasal spray as needed 3/4/19  Yes Historical Provider, MD   Cholecalciferol (VITAMIN D3) 5000 units TABS Take by mouth daily   Yes Historical Provider, MD   Multiple Vitamins-Minerals (THERAPEUTIC MULTIVITAMIN-MINERALS) tablet Take 1 tablet by mouth 2 times daily Indications: bariatric advantage   Yes Historical Provider, MD   acetaminophen (APAP EXTRA STRENGTH) 500 MG tablet Take 1 tablet by mouth every 6 hours as needed for Pain 5/31/15  Yes Jazmine Odor DO Avis          Physical exam:   BP (!) 162/102 (Site: Left Lower Arm, Position: Sitting, Cuff Size: Large Adult)   Pulse 83   Temp 97.6 °F (36.4 °C) (Temporal)   Resp 20   Ht 5' 4\" (1.626 m)   Wt 241 lb (109.3 kg)   BMI 41.37 kg/m²    General appearance: alert, appears stated age and cooperative  Head: Normocephalic, without obvious abnormality, atraumatic  Neck: no adenopathy, no carotid bruit, no JVD, supple, symmetrical, trachea midline and thyroid not enlarged, symmetric, no tenderness/mass/nodules  Lungs: clear to auscultation bilaterally  Heart: regular rate and rhythm  Abdomen: soft, non-tender; bowel sounds normal; no masses,  no organomegaly  Extremities: extremities normal, atraumatic, no cyanosis or edema    Assessment: Post Shannon-en- Y Gastric Bypass. She does not complain of GERD,  does not have sleep apnea,  does not have diabetes,  does have hypertension off medical treatment. Cholesterol and triglycerides are normal. Low vit d    Plan:  Keep using vit d supplement. Continue to eat a high protein, low calorie diet, eat small portions very slowly and chew well before swallowing. Drink plenty of water and fluids. Make sure to use fiber to keep the bowels regular. Try to exercise 7 days per week, maintain adequate variety and balance. Always notify the clinic if you have any medical problems. Follow up in 6 months.       Physician Signature: Electronically signed by Dr. Tere Busby MD

## 2020-02-10 ENCOUNTER — TELEPHONE (OUTPATIENT)
Dept: BARIATRICS/WEIGHT MGMT | Age: 40
End: 2020-02-10

## 2020-03-05 ENCOUNTER — OFFICE VISIT (OUTPATIENT)
Dept: PHYSICAL MEDICINE AND REHAB | Age: 40
End: 2020-03-05
Payer: COMMERCIAL

## 2020-03-05 VITALS — HEIGHT: 64 IN | WEIGHT: 220 LBS | BODY MASS INDEX: 37.56 KG/M2

## 2020-03-05 PROCEDURE — 95885 MUSC TST DONE W/NERV TST LIM: CPT | Performed by: PHYSICAL MEDICINE & REHABILITATION

## 2020-03-05 PROCEDURE — 95910 NRV CNDJ TEST 7-8 STUDIES: CPT | Performed by: PHYSICAL MEDICINE & REHABILITATION

## 2020-03-05 NOTE — PROGRESS NOTES
[facility]  Electrodiagnostic Laboratory  *Accredited by the [address] [place] [address] with exemplary status  [address] [facility]  Phone: [phone]  Fax: [phone]    Referring Provider: Heather Farris DO  Primary Care Physician: Umberto De Paz DO  Patient Name: Sammy Ulloa  Patient YOB: 1980  Gender: female  BMI: Body mass index is 37.76 kg/m². Height 5' 4\" (1.626 m), weight 220 lb (99.8 kg), not currently breastfeeding. 3/5/2020    Description of clinical problem:   Chief Complaint   Patient presents with    Hand Pain     Generalized pain in both hands present for approximately (2) months after no acute injury     Hand Numbness     Generalized numbness and tingling in the hands      Pain Yes  Pain Score:  10 - Worst pain ever; Numbness/tingling  Yes; Weakness  Yes       Brief physical exam:   Sensory deficit Yes; Weakness No; Atrophy  No; Reflex abnormality No  Sensory NCS      Nerve / Sites Rec. Site Peak Lat PP Amp Segments Distance Velocity Temp.      ms µV  cm m/s °C   R Median - Digit II (Antidromic)      Mid palm Dig II 2.29 2.9 Mid palm - Dig II   33      Wrist Dig II NR NR Wrist - Dig II 13 NR 33   L Median - Digit II (Antidromic)      Mid palm Dig II NR NR Mid palm - Dig II   33.5      Wrist Dig II NR NR Wrist - Dig II 13 NR 33.5   R Ulnar - Digit V (Antidromic)      Wrist Dig V 2.66 44.1 Wrist - Dig V 11 57 33   R Radial - Anatomical snuff box (Forearm)      Forearm Wrist 2.08 20.2 Forearm - Wrist 10 64 33.1       Motor NCS      Nerve / Sites Muscle Onset Amplitude Segments Distance Lat Diff Velocity Temp.     ms mV  cm ms m/s °C   R Median - APB      Palm APB 2.19 6.3 Palm - APB    33      Wrist APB 6.41 4.5 Wrist - Palm 8 4.22 19 33      Elbow APB 10.26 3.4 Elbow - Wrist 21 3.85 54 33   L Median - APB      Palm APB 1.82 4.6 Palm - APB    33      Wrist APB 9.74 2.6 Wrist - Palm 8 7.92 10 33      Elbow APB 14.01 2.6 Elbow - Wrist 22 4.27 52 33 R Ulnar - ADM      Wrist ADM 3.02 9.0 Wrist - ADM 8   33.1      B. Elbow ADM 6.41 8.4 B. Elbow - Wrist 21.5 3.39 64 33.1      A. Elbow ADM 7.97 7.7 A. Elbow - B. Elbow 10 1.56 64 33.1       F  Wave      Nerve Fmin    ms   R Median - APB 33.18   R Ulnar - ADM 26.09   L Median - APB 38.96       EMG      EMG Summary Table     Spontaneous MUAP Recruitment   Muscle Nerve Roots IA Fib PSW Fasc Amp Dur. PPP Pattern   L. Pronator teres Median C6-C7 N None None None N N N N   L. First dorsal interosseous Ulnar C8-T1 N None None None N N N N   L. Abductor pollicis brevis Median X4-Q0 1+ None 1+ None N N 1+ Reduced   R. Pronator teres Median C6-C7 N None None None N N N N   R. First dorsal interosseous Ulnar C8-T1 N None None None N N N N   R. Abductor pollicis brevis Median Y4-O8 N None None None N N 1+ Reduced      Study Limitations:  None     Summary of Findings:   1. Sensory nerve conduction studies of bilateral median nerves were absent across the wrist. All other nerves tested showed normal peak latencies, normal SNAP amplitudes, and normal conduction velocities. 2. Motor nerve conduction studies of bilateral median nerves showed prolonged distal wrist latencies, small amplitude and slow conduction velocity across the wrist. All other nerves tested showed normal distal latencies, normal CMAP amplitudes, and normal conduction velocities. 3. F-wave studies of bilateral median nerves revealed prolonged latencies. 4. EMG was performed with concentric needle in multiple muscles outlined above. There was evidence of active denervation through positive sharp waves and fibrillations in the left abductor pollicis brevis muscle. There were chronic neuropathic changes in bilateral abductor pollicis brevis muscles. All other muscles tested revealed normal insertional activity, without evidence of abnormal spontaneous activity. All MUAP's were of normal amplitude and duration with a full recruitment pattern.  No increase in difference </=0.4 ms n/a n/a   Median: Radial sensory digit 1 comparison Latency difference >/=0.5 n/a n/a     Nerve F wave minimal latency (ms)   Median (APB) 32   Ulnar (ADM) 32

## 2020-03-05 NOTE — PATIENT INSTRUCTIONS
Electrodiagnotic Laboratory  Accredited by the [facility] with Exemplary status  ELIA Monet D.O. On license of [facility]  [address] [facility] [place]  Phone: [phone]  Fax: [phone]        Today you had an electrodiagnostic exam which included nerve conduction studies (NCS) and electromyography (EMG). This test evaluated the electrical activity of your nerves and muscles to help determine if you have a nerve or muscle disease. This test can help determine the location and type of a nerve or muscle problem. This will help your referring doctor diagnose your condition and determine the appropriate next step in your treatment plan. After your test:    1. There are no long lasting side effects of the test.     2. You may resume your normal activities without restrictions. 3.  Resume any medications that were stopped for the test.     4  If you have sore areas or bruising in your muscles where the needle was placed, apply a cold pack to the sore area for 15-20 minutes three to four times a day as needed for pain. The soreness should go away in about 1-2 days. 5. Your results were provided  Briefly at the end of your test and the final detailed report will be provided to your referring physician, and/or primary care physician and any other parties you requested within 1-2 days of the examination. You may wish to contact your referring provider after a few days to determine what they would like you to do next. 6.  Please call [phone] with any questions or concerns and if you develop increased body temperature/fever, swelling, tenderness, increased pain and/or drainage from the sites where the needle was placed. Thank you for choosing us for your health care needs.

## 2020-08-13 ENCOUNTER — HOSPITAL ENCOUNTER (EMERGENCY)
Age: 40
Discharge: HOME | End: 2020-08-13
Attending: EMERGENCY MEDICINE
Payer: COMMERCIAL

## 2020-08-13 VITALS
SYSTOLIC BLOOD PRESSURE: 130 MMHG | DIASTOLIC BLOOD PRESSURE: 84 MMHG | RESPIRATION RATE: 16 BRPM | HEART RATE: 86 BPM | OXYGEN SATURATION: 100 % | TEMPERATURE: 98 F | BODY MASS INDEX: 38.79 KG/M2 | WEIGHT: 226 LBS

## 2020-08-13 PROCEDURE — G0381 LEV 2 HOSP TYPE B ED VISIT: HCPCS

## 2020-08-13 PROCEDURE — 96372 THER/PROPH/DIAG INJ SC/IM: CPT

## 2020-08-13 PROCEDURE — 6360000002 HC RX W HCPCS: Performed by: EMERGENCY MEDICINE

## 2020-08-13 RX ORDER — DEXAMETHASONE SODIUM PHOSPHATE 10 MG/ML
10 INJECTION, SOLUTION INTRAMUSCULAR; INTRAVENOUS ONCE
Status: COMPLETED | OUTPATIENT
Start: 2020-08-13 | End: 2020-08-13

## 2020-08-13 RX ORDER — DIAPER,BRIEF,INFANT-TODD,DISP
EACH MISCELLANEOUS
Qty: 1 TUBE | Refills: 1 | Status: SHIPPED | OUTPATIENT
Start: 2020-08-13 | End: 2020-08-20

## 2020-08-13 RX ORDER — CLINDAMYCIN HYDROCHLORIDE 150 MG/1
150 CAPSULE ORAL 4 TIMES DAILY
Qty: 40 CAPSULE | Refills: 0 | Status: SHIPPED | OUTPATIENT
Start: 2020-08-13 | End: 2020-08-23

## 2020-08-13 RX ADMIN — DEXAMETHASONE SODIUM PHOSPHATE 10 MG: 10 INJECTION, SOLUTION INTRAMUSCULAR; INTRAVENOUS at 19:17

## 2020-08-13 ASSESSMENT — PAIN DESCRIPTION - PROGRESSION
CLINICAL_PROGRESSION: NOT CHANGED
CLINICAL_PROGRESSION: NOT CHANGED

## 2020-08-13 ASSESSMENT — PAIN SCALES - GENERAL: PAINLEVEL_OUTOF10: 6

## 2020-08-13 ASSESSMENT — ENCOUNTER SYMPTOMS
COUGH: 0
ABDOMINAL DISTENTION: 0
EYE DISCHARGE: 0
BACK PAIN: 0
WHEEZING: 0
SINUS PRESSURE: 0
VOMITING: 0
SORE THROAT: 0
DIARRHEA: 0
EYE REDNESS: 0
NAUSEA: 0
EYE PAIN: 0
SHORTNESS OF BREATH: 0

## 2020-08-13 ASSESSMENT — PAIN DESCRIPTION - LOCATION: LOCATION: KNEE

## 2020-08-13 ASSESSMENT — PAIN DESCRIPTION - FREQUENCY: FREQUENCY: CONTINUOUS

## 2020-08-13 ASSESSMENT — PAIN DESCRIPTION - ORIENTATION: ORIENTATION: RIGHT;LEFT

## 2020-08-13 ASSESSMENT — PAIN DESCRIPTION - DESCRIPTORS: DESCRIPTORS: ACHING

## 2020-08-13 ASSESSMENT — PAIN DESCRIPTION - PAIN TYPE: TYPE: CHRONIC PAIN

## 2020-08-13 NOTE — ED PROVIDER NOTES
The history is provided by the patient. Dental Pain   Location:  Lower  Lower teeth location:  29/RL 2nd bicuspid and 28/RL 1st bicuspid  Quality:  Aching  Severity:  Moderate  Onset quality:  Gradual  Duration:  5 days  Chronicity:  Recurrent  Context: abscess and dental caries    Associated symptoms: no fever and no headaches    Knee Problem   Location:  Knee  Time since incident:  6 months  Injury: no    Knee location:  R knee and L knee  Chronicity:  Chronic  Associated symptoms: no back pain and no fever    Rash   Location:  Face  Facial rash location:  Face and chin  Quality: blistering    Onset quality:  Gradual  Duration:  3 days  Chronicity:  New  Associated symptoms: no diarrhea, no fever, no headaches, no joint pain, no nausea, no shortness of breath, no sore throat, not vomiting and not wheezing         Review of Systems   Constitutional: Negative for chills and fever. HENT: Negative for ear pain, sinus pressure and sore throat. Eyes: Negative for pain, discharge and redness. Respiratory: Negative for cough, shortness of breath and wheezing. Cardiovascular: Negative for chest pain. Gastrointestinal: Negative for abdominal distention, diarrhea, nausea and vomiting. Genitourinary: Negative for dysuria and frequency. Musculoskeletal: Negative for arthralgias and back pain. Skin: Negative for rash and wound. Neurological: Negative for weakness and headaches. Hematological: Negative for adenopathy. All other systems reviewed and are negative. Physical Exam  Vitals signs and nursing note reviewed. Constitutional:       Appearance: She is well-developed. HENT:      Head: Normocephalic and atraumatic. Right Ear: Hearing, tympanic membrane and external ear normal.      Left Ear: Hearing, tympanic membrane and external ear normal.      Nose: Nose normal.      Mouth/Throat:      Pharynx: Uvula midline.    Eyes:      General: Lids are normal.      Conjunctiva/sclera: Conjunctivae normal.      Pupils: Pupils are equal, round, and reactive to light. Neck:      Musculoskeletal: Normal range of motion and neck supple. Cardiovascular:      Rate and Rhythm: Normal rate and regular rhythm. Heart sounds: Normal heart sounds. No murmur. Pulmonary:      Effort: Pulmonary effort is normal. No respiratory distress. Breath sounds: Normal breath sounds. No wheezing or rales. Abdominal:      General: Bowel sounds are normal.      Palpations: Abdomen is soft. Abdomen is not rigid. Tenderness: There is no abdominal tenderness. There is no guarding or rebound. Musculoskeletal:      Right knee: She exhibits decreased range of motion and swelling. She exhibits no effusion. Tenderness found. Left knee: She exhibits decreased range of motion and swelling. She exhibits no effusion. Tenderness found. Skin:     General: Skin is warm and dry. Findings: Rash present. No abrasion. Rash is papular and vesicular. Neurological:      Mental Status: She is alert and oriented to person, place, and time. GCS: GCS eye subscore is 4. GCS verbal subscore is 5. GCS motor subscore is 6. Cranial Nerves: No cranial nerve deficit. Sensory: No sensory deficit. Coordination: Coordination normal.      Gait: Gait normal.          Procedures     MDM          --------------------------------------------- PAST HISTORY ---------------------------------------------  Past Medical History:  has a past medical history of Hypertension. Past Surgical History:  has a past surgical history that includes Abdomen surgery; [place] tooth extraction;  section (,,,,); Upper gastrointestinal endoscopy (2016); and Shannon-en-Y Gastric Bypass (2017). Social History:  reports that she has quit smoking. Her smoking use included cigarettes.  She has never used smokeless tobacco. She reports that she does not drink alcohol or use drugs. Family History: family history includes Hearing Loss in her paternal cousin; Heart Disease in her mother. The patients home medications have been reviewed. Allergies: Ibuprofen    -------------------------------------------------- RESULTS -------------------------------------------------  Labs:  No results found for this visit on 08/13/20. Radiology:  No orders to display       ------------------------- NURSING NOTES AND VITALS REVIEWED ---------------------------  Date / Time Roomed:  8/13/2020  6:57 PM  ED Bed Assignment:  02/02    The nursing notes within the ED encounter and vital signs as below have been reviewed. /84   Pulse 86   Temp 98 °F (36.7 °C) (Skin)   Resp 16   Wt 226 lb (102.5 kg)   SpO2 100%   BMI 38.79 kg/m²   Oxygen Saturation Interpretation: Normal      ------------------------------------------ PROGRESS NOTES ------------------------------------------  I have spoken with the patient and discussed todays results, in addition to providing specific details for the plan of care and counseling regarding the diagnosis and prognosis. Their questions are answered at this time and they are agreeable with the plan. I discussed at length with them reasons for immediate return here for re evaluation. They will followup with primary care by calling their office tomorrow. Medications   dexamethasone (PF) (DECADRON) injection 10 mg (10 mg Intramuscular Given 8/13/20 1917)           --------------------------------- ADDITIONAL PROVIDER NOTES ---------------------------------  At this time the patient is without objective evidence of an acute process requiring hospitalization or inpatient management. They have remained hemodynamically stable throughout their entire ED visit and are stable for discharge with outpatient follow-up.      The plan has been discussed in detail and they are aware of the specific conditions for emergent return, as well as the importance of follow-up. New Prescriptions    CLINDAMYCIN (CLEOCIN) 150 MG CAPSULE    Take 1 capsule by mouth 4 times daily for 10 days    HYDROCORTISONE 1 % CREAM    APPLY TO AFFECTED AREA BID FOR 10 DAYS       Diagnosis:  1. Dental infection    2. Chronic pain of both knees    3. Dermatitis        Disposition:  Patient's disposition: Discharge to home  Patient's condition is stable.                       Yasmin Nissen, MD  08/13/20 8021

## 2020-08-17 ENCOUNTER — TELEPHONE (OUTPATIENT)
Dept: BARIATRICS/WEIGHT MGMT | Age: 40
End: 2020-08-17

## 2020-09-21 ENCOUNTER — HOSPITAL ENCOUNTER (OUTPATIENT)
Age: 40
Discharge: HOME | End: 2020-09-21
Payer: COMMERCIAL

## 2020-09-21 LAB
ALBUMIN SERPL-MCNC: 4.1 G/DL (ref 3.5–5.2)
ALP BLD-CCNC: 126 U/L (ref 35–104)
ALT SERPL-CCNC: 41 U/L (ref 0–32)
ANION GAP SERPL CALCULATED.3IONS-SCNC: 11 MMOL/L (ref 7–16)
AST SERPL-CCNC: 22 U/L (ref 0–31)
BILIRUB SERPL-MCNC: 0.3 MG/DL (ref 0–1.2)
BUN BLDV-MCNC: 11 MG/DL (ref 6–20)
CALCIUM SERPL-MCNC: 8.7 MG/DL (ref 8.6–10.2)
CHLORIDE BLD-SCNC: 107 MMOL/L (ref 98–107)
CHOLESTEROL, TOTAL: 135 MG/DL (ref 0–199)
CO2: 21 MMOL/L (ref 22–29)
CREAT SERPL-MCNC: 0.8 MG/DL (ref 0.5–1)
FERRITIN: 145 NG/ML
FOLATE: 15.9 NG/ML (ref 4.8–24.2)
GFR AFRICAN AMERICAN: >60
GFR NON-AFRICAN AMERICAN: >60 ML/MIN/1.73
GLUCOSE BLD-MCNC: 78 MG/DL (ref 74–99)
HCT VFR BLD CALC: 40.3 % (ref 34–48)
HEMOGLOBIN: 13.4 G/DL (ref 11.5–15.5)
MCH RBC QN AUTO: 28.4 PG (ref 26–35)
MCHC RBC AUTO-ENTMCNC: 33.3 % (ref 32–34.5)
MCV RBC AUTO: 85.4 FL (ref 80–99.9)
PDW BLD-RTO: 12.2 FL (ref 11.5–15)
PLATELET # BLD: 309 E9/L (ref 130–450)
PMV BLD AUTO: 9.7 FL (ref 7–12)
POTASSIUM SERPL-SCNC: 4 MMOL/L (ref 3.5–5)
PREALBUMIN: 22 MG/DL (ref 20–40)
RBC # BLD: 4.72 E12/L (ref 3.5–5.5)
SODIUM BLD-SCNC: 139 MMOL/L (ref 132–146)
TOTAL PROTEIN: 7 G/DL (ref 6.4–8.3)
TRIGL SERPL-MCNC: 88 MG/DL (ref 0–149)
VITAMIN B-12: 508 PG/ML (ref 211–946)
VITAMIN D 25-HYDROXY: 23 NG/ML (ref 30–100)
WBC # BLD: 4.3 E9/L (ref 4.5–11.5)

## 2020-09-21 PROCEDURE — 82728 ASSAY OF FERRITIN: CPT

## 2020-09-21 PROCEDURE — 82465 ASSAY BLD/SERUM CHOLESTEROL: CPT

## 2020-09-21 PROCEDURE — 84425 ASSAY OF VITAMIN B-1: CPT

## 2020-09-21 PROCEDURE — 80053 COMPREHEN METABOLIC PANEL: CPT

## 2020-09-21 PROCEDURE — 82525 ASSAY OF COPPER: CPT

## 2020-09-21 PROCEDURE — 85027 COMPLETE CBC AUTOMATED: CPT

## 2020-09-21 PROCEDURE — 82306 VITAMIN D 25 HYDROXY: CPT

## 2020-09-21 PROCEDURE — 84255 ASSAY OF SELENIUM: CPT

## 2020-09-21 PROCEDURE — 84478 ASSAY OF TRIGLYCERIDES: CPT

## 2020-09-21 PROCEDURE — 82607 VITAMIN B-12: CPT

## 2020-09-21 PROCEDURE — 84134 ASSAY OF PREALBUMIN: CPT

## 2020-09-21 PROCEDURE — 82746 ASSAY OF FOLIC ACID SERUM: CPT

## 2020-09-21 PROCEDURE — 36415 COLL VENOUS BLD VENIPUNCTURE: CPT

## 2020-09-21 PROCEDURE — 84630 ASSAY OF ZINC: CPT

## 2020-09-23 ENCOUNTER — OFFICE VISIT (OUTPATIENT)
Dept: BARIATRICS/WEIGHT MGMT | Age: 40
End: 2020-09-23
Payer: COMMERCIAL

## 2020-09-23 VITALS
TEMPERATURE: 98.2 F | DIASTOLIC BLOOD PRESSURE: 75 MMHG | SYSTOLIC BLOOD PRESSURE: 133 MMHG | BODY MASS INDEX: 39.78 KG/M2 | HEART RATE: 89 BPM | WEIGHT: 233 LBS | RESPIRATION RATE: 20 BRPM | HEIGHT: 64 IN

## 2020-09-23 PROCEDURE — 1036F TOBACCO NON-USER: CPT | Performed by: SURGERY

## 2020-09-23 PROCEDURE — 99214 OFFICE O/P EST MOD 30 MIN: CPT | Performed by: SURGERY

## 2020-09-23 PROCEDURE — G8417 CALC BMI ABV UP PARAM F/U: HCPCS | Performed by: SURGERY

## 2020-09-23 PROCEDURE — 99211 OFF/OP EST MAY X REQ PHY/QHP: CPT

## 2020-09-23 PROCEDURE — G8427 DOCREV CUR MEDS BY ELIG CLIN: HCPCS | Performed by: SURGERY

## 2020-09-23 NOTE — PATIENT INSTRUCTIONS
Please continue to take your vitamin and mineral supplements as instructed. If you received a blood work prescription today for laboratory monitoring due prior to your next routine follow-up visit, please have this blood work obtained 10 to 14 days prior to your next visit. It is important to fast for 12 hours prior to routine weight loss surgery blood work, EXCEPT for drinking water, to ensure accuracy of results. Please report nausea, vomiting, abdominal pain, or any other problems you experience to your surgeon. For problems related to weight loss surgery, it is best to go to [address] [place] [address] Emergency Department and have your surgeon paged.

## 2020-09-23 NOTE — PROGRESS NOTES
Patient here for 3 year post op LRYGB. Denies reflux.  Bowel function normal.
Upper Arm, Position: Sitting, Cuff Size: Large Adult)   Pulse 89   Temp 98.2 °F (36.8 °C) (Temporal)   Resp 20   Ht 5' 4\" (1.626 m)   Wt 233 lb (105.7 kg)   BMI 39.99 kg/m²    General appearance: alert, appears stated age and cooperative  Head: Normocephalic, without obvious abnormality, atraumatic  Neck: no adenopathy, no carotid bruit, no JVD, supple, symmetrical, trachea midline and thyroid not enlarged, symmetric, no tenderness/mass/nodules  Lungs: clear to auscultation bilaterally  Heart: regular rate and rhythm  Abdomen: soft, non-tender; bowel sounds normal; no masses,  no organomegaly  Extremities: extremities normal, atraumatic, no cyanosis or edema    Assessment: Post Shannon-en- Y Gastric Bypass. She does complain of GERD,  does not have sleep apnea,  does not have diabetes,  does not have hypertension off medical treatment. Cholesterol and triglycerides are normal. Vit d deficit    Plan: Will do EGD to assess for gerd and size of gastrojejunostomy. Continue to eat a high protein, low calorie diet, eat small portions very slowly and chew well before swallowing. Drink plenty of water and fluids. Make sure to use fiber to keep the bowels regular. Try to exercise 7 days per week, maintain adequate variety and balance. Always notify the clinic if you have any medical problems.        Physician Signature: Electronically signed by Dr. Verna Carrillo MD

## 2020-09-24 ENCOUNTER — PREP FOR PROCEDURE (OUTPATIENT)
Dept: SURGERY | Age: 40
End: 2020-09-24

## 2020-09-24 RX ORDER — SODIUM CHLORIDE, SODIUM LACTATE, POTASSIUM CHLORIDE, CALCIUM CHLORIDE 600; 310; 30; 20 MG/100ML; MG/100ML; MG/100ML; MG/100ML
INJECTION, SOLUTION INTRAVENOUS CONTINUOUS
Status: CANCELLED | OUTPATIENT
Start: 2020-09-24

## 2020-09-26 ENCOUNTER — HOSPITAL ENCOUNTER (EMERGENCY)
Age: 40
Discharge: HOME | End: 2020-09-26
Attending: FAMILY MEDICINE
Payer: COMMERCIAL

## 2020-09-26 VITALS
DIASTOLIC BLOOD PRESSURE: 97 MMHG | WEIGHT: 233 LBS | HEART RATE: 92 BPM | HEIGHT: 64 IN | OXYGEN SATURATION: 99 % | RESPIRATION RATE: 16 BRPM | TEMPERATURE: 98.4 F | BODY MASS INDEX: 39.78 KG/M2 | SYSTOLIC BLOOD PRESSURE: 146 MMHG

## 2020-09-26 LAB
COPPER: 96.1 UG/DL (ref 80–155)
SELENIUM: 104.4 UG/L (ref 23–190)
VITAMIN B1 WHOLE BLOOD: 97 NMOL/L (ref 70–180)
ZINC: 62.6 UG/DL (ref 60–120)

## 2020-09-26 PROCEDURE — 6360000002 HC RX W HCPCS: Performed by: FAMILY MEDICINE

## 2020-09-26 PROCEDURE — 96372 THER/PROPH/DIAG INJ SC/IM: CPT

## 2020-09-26 PROCEDURE — G0382 LEV 3 HOSP TYPE B ED VISIT: HCPCS

## 2020-09-26 RX ORDER — PREDNISONE 10 MG/1
40 TABLET ORAL DAILY
Qty: 16 TABLET | Refills: 0 | Status: SHIPPED | OUTPATIENT
Start: 2020-09-26 | End: 2020-09-30

## 2020-09-26 RX ORDER — METHYLPREDNISOLONE SODIUM SUCCINATE 125 MG/2ML
125 INJECTION, POWDER, LYOPHILIZED, FOR SOLUTION INTRAMUSCULAR; INTRAVENOUS ONCE
Status: COMPLETED | OUTPATIENT
Start: 2020-09-26 | End: 2020-09-26

## 2020-09-26 RX ORDER — AZITHROMYCIN 250 MG/1
TABLET, FILM COATED ORAL
Qty: 1 PACKET | Refills: 0 | Status: SHIPPED | OUTPATIENT
Start: 2020-09-26 | End: 2020-09-30

## 2020-09-26 RX ORDER — FLUCONAZOLE 150 MG/1
150 TABLET ORAL ONCE
Qty: 1 TABLET | Refills: 1 | Status: SHIPPED | OUTPATIENT
Start: 2020-09-26 | End: 2020-09-26

## 2020-09-26 RX ADMIN — METHYLPREDNISOLONE SODIUM SUCCINATE 125 MG: 125 INJECTION, POWDER, FOR SOLUTION INTRAMUSCULAR; INTRAVENOUS at 16:15

## 2020-09-26 ASSESSMENT — PAIN DESCRIPTION - ONSET: ONSET: ON-GOING

## 2020-09-26 ASSESSMENT — PAIN DESCRIPTION - LOCATION: LOCATION: KNEE

## 2020-09-26 ASSESSMENT — PAIN DESCRIPTION - PROGRESSION
CLINICAL_PROGRESSION: NOT CHANGED
CLINICAL_PROGRESSION: NOT CHANGED

## 2020-09-26 ASSESSMENT — PAIN DESCRIPTION - ORIENTATION: ORIENTATION: RIGHT;LEFT

## 2020-09-26 ASSESSMENT — PAIN DESCRIPTION - DESCRIPTORS: DESCRIPTORS: ACHING

## 2020-09-26 ASSESSMENT — PAIN SCALES - GENERAL: PAINLEVEL_OUTOF10: 10

## 2020-09-26 ASSESSMENT — PAIN DESCRIPTION - FREQUENCY: FREQUENCY: CONTINUOUS

## 2020-09-26 NOTE — ED PROVIDER NOTES
HPI:  20,   Time: 3:48 PM EDT         Yoli Meeks is a 38 y.o. female presenting to the ED for a 2-day history of facial pressure, frontal headache, achy muscles and flareup of her chronic knee pain. She denies nausea or vomiting. She denies diarrhea. She denies dysuria or frequency of urgency of urine. She denies cough or shortness of breath. She denies any difficulty tasting her food. She denies sick contacts. ROS:   Pertinent positives and negatives are stated within HPI, all other systems reviewed and are negative.  --------------------------------------------- PAST HISTORY ---------------------------------------------  Past Medical History:  has a past medical history of Hypertension. Past Surgical History:  has a past surgical history that includes Abdomen surgery; [place] tooth extraction;  section (,,,,); Upper gastrointestinal endoscopy (2016); and Shannon-en-Y Gastric Bypass (2017). Social History:  reports that she has quit smoking. Her smoking use included cigarettes. She has never used smokeless tobacco. She reports that she does not drink alcohol or use drugs. Family History: family history includes Hearing Loss in her paternal cousin; Heart Disease in her mother. The patients home medications have been reviewed. Allergies: Ibuprofen    -------------------------------------------------- RESULTS -------------------------------------------------  All laboratory and radiology results have been personally reviewed by myself   LABS:  No results found for this visit on 20. RADIOLOGY:  Interpreted by Radiologist.  No orders to display       ------------------------- NURSING NOTES AND VITALS REVIEWED ---------------------------   The nursing notes within the ED encounter and vital signs as below have been reviewed.    BP (!) 146/97   Pulse 92   Temp 98.4 °F (36.9 °C) (Temporal)   Resp 16   Ht 5' 4\" (1.626 m)   Wt 233 lb (105.7 kg)   SpO2 99%   BMI 39.99 kg/m²   Oxygen Saturation Interpretation: Normal      ---------------------------------------------------PHYSICAL EXAM--------------------------------------    Constitutional/General: Alert and oriented x3, well appearing, non toxic in NAD  Head: NC/AT  There is frontal and paranasal tenderness to palpation of moderate intensity. Eyes: PERRL, EOMI  Mouth: Oropharynx clear, handling secretions, no trismus  Neck: Supple, full ROM, no meningeal signs  Pulmonary: Lungs clear to auscultation bilaterally, no wheezes, rales, or rhonchi. Not in respiratory distress  Cardiovascular:  Regular rate and rhythm, no murmurs, gallops, or rubs. 2+ distal pulses  Abdomen: Soft, non tender, non distended,   Extremities: Moves all extremities x 4. Warm and well perfused  Skin: warm and dry without rash  Neurologic: GCS 15,  Psych: Normal Affect      ------------------------------ ED COURSE/MEDICAL DECISION MAKING----------------------  Medications   methylPREDNISolone sodium (SOLU-MEDROL) injection 125 mg (125 mg Intramuscular Given 9/26/20 1615)         Medical Decision Making:    Simple    Patient has a flareup of her chronic knee pain, probably due to the weather changes. She has a history of allergic rhinitis, takes Claritin every day all year round. I recommend she restart her nasal steroid. She will be given a course of Zithromax antibiotic. I will give her a short course of prednisone as an anti-inflammatory. She has allergy to ibuprofen, it causes rash and swelling. I recommended she can add Tylenol to help her arthritic pain. She is under the care of an orthopedic specialist and has an upcoming appointment in the next month for follow-up. Counseling: The emergency provider has spoken with the patient and discussed todays results, in addition to providing specific details for the plan of care and counseling regarding the diagnosis and prognosis.   Questions are answered at this time and they are agreeable with the plan.      --------------------------------- IMPRESSION AND DISPOSITION ---------------------------------    IMPRESSION  1. Acute sinusitis, recurrence not specified, unspecified location    2. Allergic rhinitis, unspecified seasonality, unspecified trigger    3.  Chronic pain of both knees        DISPOSITION  Disposition: Discharge to home  Patient condition is stable                 Ravi Casper MD  09/26/20 8135

## 2020-10-05 ENCOUNTER — HOSPITAL ENCOUNTER (OUTPATIENT)
Age: 40
Discharge: HOME | End: 2020-10-05
Payer: COMMERCIAL

## 2020-10-05 ENCOUNTER — HOSPITAL ENCOUNTER (OUTPATIENT)
Age: 40
Discharge: HOME | End: 2020-10-07
Payer: COMMERCIAL

## 2020-10-05 PROCEDURE — U0003 INFECTIOUS AGENT DETECTION BY NUCLEIC ACID (DNA OR RNA); SEVERE ACUTE RESPIRATORY SYNDROME CORONAVIRUS 2 (SARS-COV-2) (CORONAVIRUS DISEASE [COVID-19]), AMPLIFIED PROBE TECHNIQUE, MAKING USE OF HIGH THROUGHPUT TECHNOLOGIES AS DESCRIBED BY CMS-2020-01-R: HCPCS

## 2020-10-07 LAB
SARS-COV-2: NOT DETECTED
SOURCE: NORMAL

## 2020-10-09 ENCOUNTER — ANESTHESIA EVENT (OUTPATIENT)
Dept: ENDOSCOPY | Age: 40
End: 2020-10-09
Payer: COMMERCIAL

## 2020-10-09 ENCOUNTER — ANESTHESIA (OUTPATIENT)
Dept: ENDOSCOPY | Age: 40
End: 2020-10-09
Payer: COMMERCIAL

## 2020-10-09 ENCOUNTER — HOSPITAL ENCOUNTER (OUTPATIENT)
Age: 40
Setting detail: OUTPATIENT SURGERY
Discharge: HOME | End: 2020-10-09
Attending: SURGERY | Admitting: SURGERY
Payer: COMMERCIAL

## 2020-10-09 VITALS
BODY MASS INDEX: 40.12 KG/M2 | DIASTOLIC BLOOD PRESSURE: 83 MMHG | HEIGHT: 64 IN | WEIGHT: 235 LBS | SYSTOLIC BLOOD PRESSURE: 131 MMHG | OXYGEN SATURATION: 99 % | TEMPERATURE: 98.1 F | RESPIRATION RATE: 16 BRPM | HEART RATE: 81 BPM

## 2020-10-09 VITALS
SYSTOLIC BLOOD PRESSURE: 128 MMHG | DIASTOLIC BLOOD PRESSURE: 80 MMHG | OXYGEN SATURATION: 100 % | RESPIRATION RATE: 12 BRPM

## 2020-10-09 LAB — HCG(URINE) PREGNANCY TEST: NEGATIVE

## 2020-10-09 PROCEDURE — 2580000003 HC RX 258: Performed by: SURGERY

## 2020-10-09 PROCEDURE — 99024 POSTOP FOLLOW-UP VISIT: CPT | Performed by: SURGERY

## 2020-10-09 PROCEDURE — 88342 IMHCHEM/IMCYTCHM 1ST ANTB: CPT

## 2020-10-09 PROCEDURE — 6360000002 HC RX W HCPCS: Performed by: NURSE ANESTHETIST, CERTIFIED REGISTERED

## 2020-10-09 PROCEDURE — 7100000011 HC PHASE II RECOVERY - ADDTL 15 MIN: Performed by: SURGERY

## 2020-10-09 PROCEDURE — 2500000003 HC RX 250 WO HCPCS: Performed by: NURSE ANESTHETIST, CERTIFIED REGISTERED

## 2020-10-09 PROCEDURE — 2709999900 HC NON-CHARGEABLE SUPPLY: Performed by: SURGERY

## 2020-10-09 PROCEDURE — 3700000000 HC ANESTHESIA ATTENDED CARE: Performed by: SURGERY

## 2020-10-09 PROCEDURE — 3609012400 HC EGD TRANSORAL BIOPSY SINGLE/MULTIPLE: Performed by: SURGERY

## 2020-10-09 PROCEDURE — 3700000001 HC ADD 15 MINUTES (ANESTHESIA): Performed by: SURGERY

## 2020-10-09 PROCEDURE — 7100000010 HC PHASE II RECOVERY - FIRST 15 MIN: Performed by: SURGERY

## 2020-10-09 PROCEDURE — 88305 TISSUE EXAM BY PATHOLOGIST: CPT

## 2020-10-09 PROCEDURE — 81025 URINE PREGNANCY TEST: CPT

## 2020-10-09 PROCEDURE — 43239 EGD BIOPSY SINGLE/MULTIPLE: CPT | Performed by: SURGERY

## 2020-10-09 RX ORDER — SODIUM CHLORIDE, SODIUM LACTATE, POTASSIUM CHLORIDE, CALCIUM CHLORIDE 600; 310; 30; 20 MG/100ML; MG/100ML; MG/100ML; MG/100ML
INJECTION, SOLUTION INTRAVENOUS CONTINUOUS
Status: DISCONTINUED | OUTPATIENT
Start: 2020-10-09 | End: 2020-10-09 | Stop reason: HOSPADM

## 2020-10-09 RX ORDER — LIDOCAINE HYDROCHLORIDE 20 MG/ML
INJECTION, SOLUTION EPIDURAL; INFILTRATION; INTRACAUDAL; PERINEURAL PRN
Status: DISCONTINUED | OUTPATIENT
Start: 2020-10-09 | End: 2020-10-09 | Stop reason: SDUPTHER

## 2020-10-09 RX ORDER — SUCRALFATE 1 G/1
0.5 TABLET ORAL 4 TIMES DAILY
Qty: 120 TABLET | Refills: 3 | Status: SHIPPED | OUTPATIENT
Start: 2020-10-09 | End: 2021-03-09

## 2020-10-09 RX ORDER — OMEPRAZOLE 20 MG/1
20 CAPSULE, DELAYED RELEASE ORAL 2 TIMES DAILY
Qty: 30 CAPSULE | Refills: 3 | Status: SHIPPED | OUTPATIENT
Start: 2020-10-09 | End: 2021-03-09

## 2020-10-09 RX ORDER — PROPOFOL 10 MG/ML
INJECTION, EMULSION INTRAVENOUS PRN
Status: DISCONTINUED | OUTPATIENT
Start: 2020-10-09 | End: 2020-10-09 | Stop reason: SDUPTHER

## 2020-10-09 RX ADMIN — PROPOFOL 40 MG: 10 INJECTION, EMULSION INTRAVENOUS at 09:26

## 2020-10-09 RX ADMIN — PROPOFOL 50 MG: 10 INJECTION, EMULSION INTRAVENOUS at 09:24

## 2020-10-09 RX ADMIN — SODIUM CHLORIDE, POTASSIUM CHLORIDE, SODIUM LACTATE AND CALCIUM CHLORIDE: 600; 310; 30; 20 INJECTION, SOLUTION INTRAVENOUS at 09:03

## 2020-10-09 RX ADMIN — PROPOFOL 30 MG: 10 INJECTION, EMULSION INTRAVENOUS at 09:27

## 2020-10-09 RX ADMIN — PROPOFOL 40 MG: 10 INJECTION, EMULSION INTRAVENOUS at 09:22

## 2020-10-09 RX ADMIN — PROPOFOL 40 MG: 10 INJECTION, EMULSION INTRAVENOUS at 09:25

## 2020-10-09 RX ADMIN — SODIUM CHLORIDE, POTASSIUM CHLORIDE, SODIUM LACTATE AND CALCIUM CHLORIDE: 600; 310; 30; 20 INJECTION, SOLUTION INTRAVENOUS at 08:46

## 2020-10-09 RX ADMIN — LIDOCAINE HYDROCHLORIDE 140 MG: 20 INJECTION, SOLUTION EPIDURAL; INFILTRATION; INTRACAUDAL; PERINEURAL at 09:23

## 2020-10-09 RX ADMIN — PROPOFOL 50 MG: 10 INJECTION, EMULSION INTRAVENOUS at 09:23

## 2020-10-09 ASSESSMENT — PAIN SCALES - GENERAL
PAINLEVEL_OUTOF10: 0
PAINLEVEL_OUTOF10: 0

## 2020-10-09 ASSESSMENT — PAIN - FUNCTIONAL ASSESSMENT: PAIN_FUNCTIONAL_ASSESSMENT: 0-10

## 2020-10-09 ASSESSMENT — ENCOUNTER SYMPTOMS: SHORTNESS OF BREATH: 0

## 2020-10-09 NOTE — ANESTHESIA POSTPROCEDURE EVALUATION
Department of Anesthesiology  Postprocedure Note    Patient: Muriel Keene  MRN: [id number]  YOB: 1980  Date of evaluation: 10/9/2020  Time:  10:41 AM     Procedure Summary     Date:  10/09/20 Room / Location:  [address] [place] [address] / [address] [place] [address]    Anesthesia Start:  5049 Anesthesia Stop:  7087    Procedure:  EGD BIOPSY (N/A ) Diagnosis:  (GERD)    Surgeon:  Luis Alberto Gaona MD Responsible Provider:  Colton Lara MD    Anesthesia Type:  MAC ASA Status:  2          Anesthesia Type: MAC    Cori Phase I: Cori Score: 10    Cori Phase II: Cori Score: 10    Last vitals: Reviewed and per EMR flowsheets.        Anesthesia Post Evaluation    Patient location during evaluation: PACU  Patient participation: complete - patient participated  Level of consciousness: awake and alert  Airway patency: patent  Nausea & Vomiting: no vomiting and no nausea  Complications: no  Cardiovascular status: hemodynamically stable  Respiratory status: acceptable  Hydration status: stable

## 2020-10-09 NOTE — OP NOTE
SURGEON: John Hansen M.D. PREOPERATIVE DIAGNOSES:  S/p rygb with gerd    POSTOPERATIVE DIAGNOSES: anastomotic ulcer, large 6cm hiatal hernia     OPERATION: Slabmejg-[id number]-[id number] with biopsy    BLOOD LOSS: 0ML    ANESTHESIA: LMAC    COMPLICATIONS: None. OPERATIONS: The patient was placed on the table in left lateral decubitus position and sedated. Bite block was placed. A lubricated scope was easily passed into the upper esophagus which looked normal. The distal esophagus looked normal. The scope was passed into the stomach pouch which was normal. Biopsy was taken to check for H. pylori. The scope was then passed through the anastomosis which was 14mm and had ulcer present. Retroflex showed 6cm hiatal hernia. The scope was then withdrawn. The patient tolerated the procedure well.      Physician Signature: Electronically signed by Dr. Ashley Weiss

## 2020-10-09 NOTE — ANESTHESIA PRE PROCEDURE
Department of Anesthesiology  Preprocedure Note       Name:  Júnior Cruz   Age:  38 y.o.  :  1980                                          MRN:  [id number]         Date:  10/9/2020      Surgeon: Karl Watkins):  Gabi Crowe MD    Procedure: Procedure(s):  EGD ESOPHAGOGASTRODUODENOSCOPY    Medications prior to admission:   Prior to Admission medications    Medication Sig Start Date End Date Taking? Authorizing Provider   lisinopril (PRINIVIL;ZESTRIL) 5 MG tablet TAKE 1 TABLET BY MOUTH EVERY DAY 20  Yes Historical Provider, MD   albuterol sulfate  (90 Base) MCG/ACT inhaler TAKE 1 PUFF INHALED 4 TIMES PER DAY TAKE AS NEEDED 20  Yes Historical Provider, MD   Diclofenac Sodium  MG TB24 TAKE 1 TABLET, 1 TIME PER DAY. WITH FOOD 19  Yes Historical Provider, MD   LORATADINE-D 24HR  MG per extended release tablet TAKE ONE TABLET BY MOUTH EVERY DAY AS NEEDED 3/20/19  Yes Historical Provider, MD   Cholecalciferol (VITAMIN D3) 5000 units TABS Take by mouth daily   Yes Historical Provider, MD   Multiple Vitamins-Minerals (THERAPEUTIC MULTIVITAMIN-MINERALS) tablet Take 1 tablet by mouth 2 times daily Indications: bariatric advantage   Yes Historical Provider, MD   acetaminophen (APAP EXTRA STRENGTH) 500 MG tablet Take 1 tablet by mouth every 6 hours as needed for Pain 5/31/15  Yes Cesar Dowell DO   fluticasone (FLONASE) 50 MCG/ACT nasal spray as needed 3/4/19   Historical Provider, MD       Current medications:    Current Facility-Administered Medications   Medication Dose Route Frequency Provider Last Rate Last Dose    lactated ringers infusion   Intravenous Continuous Gabi Crowe MD           Allergies:     Allergies   Allergen Reactions    Ibuprofen Swelling       Problem List:    Patient Active Problem List   Diagnosis Code    S/P gastric bypass Z98.84       Past Medical History:        Diagnosis Date    Hypertension     during pregnancy       Past Surgical History: Procedure Laterality Date    ABDOMEN SURGERY       SECTION  ,,,,2014    KATRINA-EN-Y GASTRIC BYPASS  2017    Laparascopic    UPPER GASTROINTESTINAL ENDOSCOPY  2016    Dr. Nanci Day EXTRACTION         Social History:    Social History     Tobacco Use    Smoking status: Former Smoker     Types: Cigarettes    Smokeless tobacco: Never Used   Substance Use Topics    Alcohol use: No                                Counseling given: Not Answered      Vital Signs (Current):   Vitals:    10/09/20 0820   BP: (!) 117/56   Pulse: 75   Resp: 16   Temp: 97.5 °F (36.4 °C)   SpO2: 99%   Weight: 235 lb (106.6 kg)   Height: 5' 4\" (1.626 m)                                              BP Readings from Last 3 Encounters:   10/09/20 (!) 117/56   20 (!) 146/97   20 133/75       NPO Status: Time of last liquid consumption: 1                        Time of last solid consumption: 2300                        Date of last liquid consumption: 10/09/20                        Date of last solid food consumption: 10/08/20    BMI:   Wt Readings from Last 3 Encounters:   10/09/20 235 lb (106.6 kg)   20 233 lb (105.7 kg)   20 233 lb (105.7 kg)     Body mass index is 40.34 kg/m².     CBC:   Lab Results   Component Value Date    WBC 4.3 2020    RBC 4.72 2020    HGB 13.4 2020    HCT 40.3 2020    MCV 85.4 2020    RDW 12.2 2020     2020       CMP:   Lab Results   Component Value Date     2020    K 4.0 2020     2020    CO2 21 2020    BUN 11 2020    CREATININE 0.8 2020    GFRAA >60 2020    LABGLOM >60 2020    GLUCOSE 78 2020    PROT 7.0 2020    CALCIUM 8.7 2020    BILITOT 0.3 2020    ALKPHOS 126 2020    AST 22 2020    ALT 41 2020       POC Tests: No results for input(s): POCGLU, POCNA, POCK, POCCL, POCBUN, POCHEMO, POCHCT in the last 72 hours. Coags: No results found for: PROTIME, INR, APTT    HCG (If Applicable):   Lab Results   Component Value Date    PREGTESTUR NEGATIVE 10/09/2020        ABGs: No results found for: PHART, PO2ART, HFO0GOL, WDJ3SZR, BEART, C1AKOCZV     Type & Screen (If Applicable):  No results found for: LABABO, LABRH    Drug/Infectious Status (If Applicable):  No results found for: HIV, HEPCAB    COVID-19 Screening (If Applicable):   Lab Results   Component Value Date    COVID19 Not Detected 10/05/2020         Anesthesia Evaluation  Patient summary reviewed no history of anesthetic complications:   Airway: Mallampati: II  TM distance: >3 FB   Neck ROM: full  Mouth opening: > = 3 FB Dental: normal exam         Pulmonary: breath sounds clear to auscultation      (-) COPD and shortness of breath                          ROS comment: Former smoker   Cardiovascular:  Exercise tolerance: good (>4 METS),   (+) hypertension (PREGNANCY):,       ECG reviewed  Rhythm: regular  Rate: normal  Echocardiogram reviewed                  Neuro/Psych:               GI/Hepatic/Renal:        (-) liver disease and no renal disease      ROS comment: S/P Gastric Bypass. Endo/Other: Negative Endo/Other ROS                    Abdominal:   (+) obese,         Vascular:                                        Anesthesia Plan      MAC     ASA 2       Induction: intravenous. MIPS: Prophylactic antiemetics administered. Anesthetic plan and risks discussed with patient. Plan discussed with CRNA.                   Anila Mohan MD   10/9/2020

## 2020-10-09 NOTE — H&P
Shannon-en- Y Gastric Bypass  3 Year Post-Operative Follow-up      Cherry Campbell is a 38 y.o. female who is 3 years post Laparoscopic Shannon-en-Y Gastric Bypass surgery. Reports gerd . She is not having swallowing difficulty, is compliant most of the time with the multivitamins and calcium + Vit D. She is meeting fluid recommendations of at least 64 ounces per day and is meeting protein recommendations. She  is exercising: no regular exercise.     Weight=233 lb (105.7 kg)  Today's weight represents a total weight loss of 74 pounds since surgery with 26 pounds regained since the lowest weight.     Home Medications           Prior to Admission medications    Medication Sig Start Date End Date Taking? Authorizing Provider   tiZANidine (ZANAFLEX) 2 MG tablet TAKE 1 TABLET BY MOUTH TWICE A DAY AS NEEDED 1/12/20   Yes Historical Provider, MD   lisinopril (PRINIVIL;ZESTRIL) 5 MG tablet TAKE 1 TABLET BY MOUTH EVERY DAY 1/12/20   Yes Historical Provider, MD   albuterol sulfate  (90 Base) MCG/ACT inhaler TAKE 1 PUFF INHALED 4 TIMES PER DAY TAKE AS NEEDED 1/12/20   Yes Historical Provider, MD   Diclofenac Sodium  MG TB24 TAKE 1 TABLET, 1 TIME PER DAY.  WITH FOOD 11/19/19   Yes Historical Provider, MD   LORATADINE-D 24HR  MG per extended release tablet TAKE ONE TABLET BY MOUTH EVERY DAY AS NEEDED 3/20/19   Yes Historical Provider, MD   fluticasone (FLONASE) 50 MCG/ACT nasal spray as needed 3/4/19   Yes Historical Provider, MD   Cholecalciferol (VITAMIN D3) 5000 units TABS Take by mouth daily     Yes Historical Provider, MD   Multiple Vitamins-Minerals (THERAPEUTIC MULTIVITAMIN-MINERALS) tablet Take 1 tablet by mouth 2 times daily Indications: bariatric advantage     Yes Historical Provider, MD   acetaminophen (APAP EXTRA STRENGTH) 500 MG tablet Take 1 tablet by mouth every 6 hours as needed for Pain 5/31/15   Yes Carmen Pham,              Physical exam:  BP (!) 117/56   Pulse 75   Temp 97.5 °F (36.4 °C) Resp 16   Ht 5' 4\" (1.626 m)   Wt 235 lb (106.6 kg)   SpO2 99%   BMI 40.34 kg/m²     General appearance: alert, appears stated age and cooperative  Head: Normocephalic, without obvious abnormality, atraumatic  Neck: no adenopathy, no carotid bruit, no JVD, supple, symmetrical, trachea midline and thyroid not enlarged, symmetric, no tenderness/mass/nodules  Lungs: clear to auscultation bilaterally  Heart: regular rate and rhythm  Abdomen: soft, non-tender; bowel sounds normal; no masses,  no organomegaly  Extremities: extremities normal, atraumatic, no cyanosis or edema     Assessment: Post Shannon-en- Y Gastric Bypass. She does complain of GERD,  does not have sleep apnea,  does not have diabetes,  does not have hypertension off medical treatment. Cholesterol and triglycerides are normal. Vit d deficit     Plan: Will do EGD to assess for gerd and size of gastrojejunostomy. Continue to eat a high protein, low calorie diet, eat small portions very slowly and chew well before swallowing. Drink plenty of water and fluids. Make sure to use fiber to keep the bowels regular. Try to exercise 7 days per week, maintain adequate variety and balance. Always notify the clinic if you have any medical problems.

## 2020-10-14 ENCOUNTER — OFFICE VISIT (OUTPATIENT)
Dept: BARIATRICS/WEIGHT MGMT | Age: 40
End: 2020-10-14
Payer: COMMERCIAL

## 2020-10-14 VITALS
BODY MASS INDEX: 40.12 KG/M2 | TEMPERATURE: 98.6 F | HEIGHT: 64 IN | WEIGHT: 235 LBS | OXYGEN SATURATION: 98 % | SYSTOLIC BLOOD PRESSURE: 140 MMHG | DIASTOLIC BLOOD PRESSURE: 78 MMHG | HEART RATE: 72 BPM | RESPIRATION RATE: 18 BRPM

## 2020-10-14 PROCEDURE — G8427 DOCREV CUR MEDS BY ELIG CLIN: HCPCS | Performed by: SURGERY

## 2020-10-14 PROCEDURE — 99214 OFFICE O/P EST MOD 30 MIN: CPT | Performed by: SURGERY

## 2020-10-14 PROCEDURE — 99211 OFF/OP EST MAY X REQ PHY/QHP: CPT

## 2020-10-14 PROCEDURE — G8484 FLU IMMUNIZE NO ADMIN: HCPCS | Performed by: SURGERY

## 2020-10-14 PROCEDURE — 1036F TOBACCO NON-USER: CPT | Performed by: SURGERY

## 2020-10-14 PROCEDURE — G8417 CALC BMI ABV UP PARAM F/U: HCPCS | Performed by: SURGERY

## 2020-10-14 NOTE — PROGRESS NOTES
times daily Indications: bariatric advantage      acetaminophen (APAP EXTRA STRENGTH) 500 MG tablet Take 1 tablet by mouth every 6 hours as needed for Pain 40 tablet 0     No current facility-administered medications for this visit. Allergies   Allergen Reactions    Ibuprofen Swelling       The patient has a family history that is negative for severe cardiovascular or respiratory issues, negative for reaction to anesthesia.     Social History     Socioeconomic History    Marital status: Single     Spouse name: Not on file    Number of children: Not on file    Years of education: Not on file    Highest education level: Not on file   Occupational History    Not on file   Social Needs    Financial resource strain: Not on file    Food insecurity     Worry: Not on file     Inability: Not on file    Transportation needs     Medical: Not on file     Non-medical: Not on file   Tobacco Use    Smoking status: Former Smoker     Types: Cigarettes    Smokeless tobacco: Never Used   Substance and Sexual Activity    Alcohol use: No    Drug use: No    Sexual activity: Yes     Partners: Male   Lifestyle    Physical activity     Days per week: Not on file     Minutes per session: Not on file    Stress: Not on file   Relationships    Social connections     Talks on phone: Not on file     Gets together: Not on file     Attends Sikhism service: Not on file     Active member of club or organization: Not on file     Attends meetings of clubs or organizations: Not on file     Relationship status: Not on file    Intimate partner violence     Fear of current or ex partner: Not on file     Emotionally abused: Not on file     Physically abused: Not on file     Forced sexual activity: Not on file   Other Topics Concern    Not on file   Social History Narrative    Not on file           Review of Systems  Review of Systems -  General ROS: negative for - chills, fatigue or malaise  ENT ROS: negative for - hearing change, nasal congestion or nasal discharge  Allergy and Immunology ROS: negative for - hives, itchy/watery eyes or nasal congestion  Hematological and Lymphatic ROS: negative for - blood clots, blood transfusions, bruising or fatigue  Endocrine ROS: negative for - malaise/lethargy, mood swings, palpitations or polydipsia/polyuria  Respiratory ROS: negative for - sputum changes, stridor, tachypnea or wheezing  Cardiovascular ROS: negative for - irregular heartbeat, loss of consciousness, murmur or orthopnea  Gastrointestinal ROS: negative for - constipation, diarrhea, gas/bloating, or hematemesis, positive for heartburn and other epigastric pain  Genito-Urinary ROS: negative for -  genital discharge, genital ulcers or hematuria  Musculoskeletal ROS: negative for - gait disturbance, muscle pain or muscular weakness    Physical exam:   BP (!) 140/78   Pulse 72   Temp 98.6 °F (37 °C) (Infrared)   Resp 18   Ht 5' 4\" (1.626 m)   Wt 235 lb (106.6 kg)   SpO2 98%   BMI 40.34 kg/m²   General appearance:  NAD  Pyscho/social: negative for tremors and hallucinations  Head: NCAT, PERRLA, EOMI, red conjunctiva  Neck: supple, no masses  Lungs: CTAB, equal chest rise bilateral  Heart: Reg rate  Abdomen: soft, nontender, nondistended  Skin; no lesions  Gu: no cva tenderness  Extremities: extremities normal, atraumatic, no cyanosis or edema      Assessment:  38 y.o. female with  symptomatic hiatal hernia s/p rygb with recurrent marginal ulcers    Plan:   TO OR for HHR with mesh, truncal vagotomy and possible tightening of gastrojejunstomy   Discussed the risk, benefits and alternatives of surgery including wound infections, bleeding, scar and hernia formation and the risks of general anesthetic including MI, CVA, sudden death or reactions to anesthetic medications. The patient understands the risks and alternatives and the possibility of converting to an open procedure.  All questions were answered to the patient's satisfaction and they freely signed the consent.       Zelalem Haile MD  3:56 PM  10/14/2020

## 2020-10-19 ENCOUNTER — TELEPHONE (OUTPATIENT)
Dept: BARIATRICS/WEIGHT MGMT | Age: 40
End: 2020-10-19

## 2020-10-19 NOTE — TELEPHONE ENCOUNTER
Per Dr. Silviano Barton she will need to have medical clearance before HHR surgery. Pre op eval faxed to Dr. William Monroe office. Pt is aware.

## 2020-10-26 ENCOUNTER — TELEPHONE (OUTPATIENT)
Dept: BARIATRICS/WEIGHT MGMT | Age: 40
End: 2020-10-26

## 2020-11-02 ENCOUNTER — TELEPHONE (OUTPATIENT)
Dept: BARIATRICS/WEIGHT MGMT | Age: 40
End: 2020-11-02

## 2020-11-02 NOTE — TELEPHONE ENCOUNTER
Per order of Dr. Alyce Chang patient is ready to be scheduled for [facility] with mesh and truncal vagotomy possible tightening of GJ anastomosis. We do have medical clearance. Call placed to patient and she wants her surgery to be on 11/16/2020. We discussed skin prep and NPO after midnight. She will not go to hospital constipated. She is in agreement with mandatory COVID testing. Faxed surgery scheduling sheet to [address] [place], [place] [address]as [place],[address] and patient placed on [facility]. HHR diet discussed and copy placed in the mail. Post op appointment set.

## 2020-11-04 ENCOUNTER — PREP FOR PROCEDURE (OUTPATIENT)
Dept: SURGERY | Age: 40
End: 2020-11-04

## 2020-11-04 RX ORDER — SODIUM CHLORIDE, SODIUM LACTATE, POTASSIUM CHLORIDE, CALCIUM CHLORIDE 600; 310; 30; 20 MG/100ML; MG/100ML; MG/100ML; MG/100ML
INJECTION, SOLUTION INTRAVENOUS CONTINUOUS
Status: CANCELLED | OUTPATIENT
Start: 2020-11-04

## 2020-11-04 RX ORDER — SODIUM CHLORIDE 0.9 % (FLUSH) 0.9 %
10 SYRINGE (ML) INJECTION EVERY 12 HOURS SCHEDULED
Status: CANCELLED | OUTPATIENT
Start: 2020-11-04

## 2020-11-04 RX ORDER — SODIUM CHLORIDE 0.9 % (FLUSH) 0.9 %
10 SYRINGE (ML) INJECTION PRN
Status: CANCELLED | OUTPATIENT
Start: 2020-11-04

## 2020-11-09 ENCOUNTER — HOSPITAL ENCOUNTER (OUTPATIENT)
Dept: PREADMISSION TESTING | Age: 40
Discharge: HOME | End: 2020-11-09
Payer: COMMERCIAL

## 2020-11-09 VITALS
WEIGHT: 240.31 LBS | DIASTOLIC BLOOD PRESSURE: 65 MMHG | RESPIRATION RATE: 16 BRPM | HEIGHT: 64 IN | SYSTOLIC BLOOD PRESSURE: 140 MMHG | TEMPERATURE: 97.6 F | BODY MASS INDEX: 41.03 KG/M2 | HEART RATE: 84 BPM | OXYGEN SATURATION: 99 %

## 2020-11-09 LAB
ANION GAP SERPL CALCULATED.3IONS-SCNC: 10 MMOL/L (ref 7–16)
BUN BLDV-MCNC: 10 MG/DL (ref 6–20)
CALCIUM SERPL-MCNC: 8.5 MG/DL (ref 8.6–10.2)
CHLORIDE BLD-SCNC: 106 MMOL/L (ref 98–107)
CO2: 22 MMOL/L (ref 22–29)
CREAT SERPL-MCNC: 0.7 MG/DL (ref 0.5–1)
GFR AFRICAN AMERICAN: >60
GFR NON-AFRICAN AMERICAN: >60 ML/MIN/1.73
GLUCOSE BLD-MCNC: 67 MG/DL (ref 74–99)
HCT VFR BLD CALC: 38.2 % (ref 34–48)
HEMOGLOBIN: 12.6 G/DL (ref 11.5–15.5)
MCH RBC QN AUTO: 28.6 PG (ref 26–35)
MCHC RBC AUTO-ENTMCNC: 33 % (ref 32–34.5)
MCV RBC AUTO: 86.8 FL (ref 80–99.9)
PDW BLD-RTO: 12.3 FL (ref 11.5–15)
PLATELET # BLD: 286 E9/L (ref 130–450)
PMV BLD AUTO: 9.5 FL (ref 7–12)
POTASSIUM REFLEX MAGNESIUM: 4 MMOL/L (ref 3.5–5)
RBC # BLD: 4.4 E12/L (ref 3.5–5.5)
SODIUM BLD-SCNC: 138 MMOL/L (ref 132–146)
WBC # BLD: 5 E9/L (ref 4.5–11.5)

## 2020-11-09 PROCEDURE — 36415 COLL VENOUS BLD VENIPUNCTURE: CPT

## 2020-11-09 PROCEDURE — 85027 COMPLETE CBC AUTOMATED: CPT

## 2020-11-09 PROCEDURE — 93005 ELECTROCARDIOGRAM TRACING: CPT | Performed by: ANESTHESIOLOGY

## 2020-11-09 PROCEDURE — 80048 BASIC METABOLIC PNL TOTAL CA: CPT

## 2020-11-09 NOTE — PROGRESS NOTES
cards, checkbooks, etc.) Do not wear any makeup (including no eye makeup) or nail polish on your fingers or toes. 11. DO NOT wear any jewelry or piercings on day of surgery. All body piercing jewelry must be removed. 12. Shower the night before surgery with _x__Antibacterial soap /SHAISTA WIPES________    13. TOTAL JOINT REPLACEMENT/HYSTERECTOMY PATIENTS ONLY---Remember to bring Blood Bank bracelet to the hospital on the day of surgery. 14. If you have a Living Will and Durable Power of  for Healthcare, please bring in a copy. 15. If appropriate bring crutches, inspirex, WALKER, CANE etc... 12. Notify your Surgeon if you develop any illness between now and surgery time, cough, cold, fever, sore throat, nausea, vomiting, etc.  Please notify your surgeon if you experience dizziness, shortness of breath or blurred vision between now & the time of your surgery. 17. If you have ___dentures, they will be removed before going to the OR; we will provide you a container. If you wear ___contact lenses or ___glasses, they will be removed; please bring a case for them. 18. To provide excellent care visitors will be limited to 1 in the room at any given time. 19. Please bring picture ID and insurance card. 20. Sleep apnea patients need to bring CPAP AND SETTINGS to hospital on day of surgery. 21. During flu season no children under the age of 15 are permitted in the hospital for the safety of all patients. 22. Other Please check in at the information desk/main lobby. Wear a mask. Please call AMBULATORY CARE if you have any further questions.    [address] [facility]     [address]

## 2020-11-10 LAB
EKG ATRIAL RATE: 80 BPM
EKG P AXIS: 20 DEGREES
EKG P-R INTERVAL: 156 MS
EKG Q-T INTERVAL: 372 MS
EKG QRS DURATION: 70 MS
EKG QTC CALCULATION (BAZETT): 429 MS
EKG R AXIS: 4 DEGREES
EKG T AXIS: 14 DEGREES
EKG VENTRICULAR RATE: 80 BPM

## 2020-11-11 ENCOUNTER — HOSPITAL ENCOUNTER (OUTPATIENT)
Age: 40
Discharge: HOME | End: 2020-11-13
Payer: COMMERCIAL

## 2020-11-11 PROCEDURE — U0003 INFECTIOUS AGENT DETECTION BY NUCLEIC ACID (DNA OR RNA); SEVERE ACUTE RESPIRATORY SYNDROME CORONAVIRUS 2 (SARS-COV-2) (CORONAVIRUS DISEASE [COVID-19]), AMPLIFIED PROBE TECHNIQUE, MAKING USE OF HIGH THROUGHPUT TECHNOLOGIES AS DESCRIBED BY CMS-2020-01-R: HCPCS

## 2020-11-13 LAB — SARS-COV-2, PCR: NOT DETECTED

## 2020-11-16 ENCOUNTER — HOSPITAL ENCOUNTER (OUTPATIENT)
Age: 40
Setting detail: OUTPATIENT SURGERY
Discharge: HOME | End: 2020-11-16
Attending: SURGERY | Admitting: SURGERY
Payer: COMMERCIAL

## 2020-11-16 ENCOUNTER — ANESTHESIA EVENT (OUTPATIENT)
Dept: OPERATING ROOM | Age: 40
End: 2020-11-16
Payer: COMMERCIAL

## 2020-11-16 ENCOUNTER — ANESTHESIA (OUTPATIENT)
Dept: OPERATING ROOM | Age: 40
End: 2020-11-16
Payer: COMMERCIAL

## 2020-11-16 VITALS
SYSTOLIC BLOOD PRESSURE: 157 MMHG | RESPIRATION RATE: 2 BRPM | DIASTOLIC BLOOD PRESSURE: 105 MMHG | TEMPERATURE: 96.4 F | OXYGEN SATURATION: 93 %

## 2020-11-16 VITALS
DIASTOLIC BLOOD PRESSURE: 89 MMHG | TEMPERATURE: 96.6 F | HEART RATE: 82 BPM | RESPIRATION RATE: 18 BRPM | OXYGEN SATURATION: 98 % | SYSTOLIC BLOOD PRESSURE: 145 MMHG

## 2020-11-16 LAB — HCG(URINE) PREGNANCY TEST: NEGATIVE

## 2020-11-16 PROCEDURE — 3600000004 HC SURGERY LEVEL 4 BASE: Performed by: SURGERY

## 2020-11-16 PROCEDURE — 7100000000 HC PACU RECOVERY - FIRST 15 MIN: Performed by: SURGERY

## 2020-11-16 PROCEDURE — 7100000001 HC PACU RECOVERY - ADDTL 15 MIN: Performed by: SURGERY

## 2020-11-16 PROCEDURE — 3600000014 HC SURGERY LEVEL 4 ADDTL 15MIN: Performed by: SURGERY

## 2020-11-16 PROCEDURE — 3700000001 HC ADD 15 MINUTES (ANESTHESIA): Performed by: SURGERY

## 2020-11-16 PROCEDURE — 6370000000 HC RX 637 (ALT 250 FOR IP)

## 2020-11-16 PROCEDURE — 7100000011 HC PHASE II RECOVERY - ADDTL 15 MIN: Performed by: SURGERY

## 2020-11-16 PROCEDURE — 6360000002 HC RX W HCPCS: Performed by: NURSE ANESTHETIST, CERTIFIED REGISTERED

## 2020-11-16 PROCEDURE — 6360000002 HC RX W HCPCS

## 2020-11-16 PROCEDURE — 2500000003 HC RX 250 WO HCPCS: Performed by: NURSE ANESTHETIST, CERTIFIED REGISTERED

## 2020-11-16 PROCEDURE — C1781 MESH (IMPLANTABLE): HCPCS | Performed by: SURGERY

## 2020-11-16 PROCEDURE — 81025 URINE PREGNANCY TEST: CPT

## 2020-11-16 PROCEDURE — 6360000002 HC RX W HCPCS: Performed by: SURGERY

## 2020-11-16 PROCEDURE — 43282 LAP PARAESOPH HER RPR W/MESH: CPT | Performed by: SURGERY

## 2020-11-16 PROCEDURE — 43651 LAPAROSCOPY VAGUS NERVE: CPT | Performed by: SURGERY

## 2020-11-16 PROCEDURE — 88302 TISSUE EXAM BY PATHOLOGIST: CPT

## 2020-11-16 PROCEDURE — 2500000003 HC RX 250 WO HCPCS: Performed by: SURGERY

## 2020-11-16 PROCEDURE — 6360000002 HC RX W HCPCS: Performed by: ANESTHESIOLOGY

## 2020-11-16 PROCEDURE — 3700000000 HC ANESTHESIA ATTENDED CARE: Performed by: SURGERY

## 2020-11-16 PROCEDURE — 2580000003 HC RX 258: Performed by: SURGERY

## 2020-11-16 PROCEDURE — 7100000010 HC PHASE II RECOVERY - FIRST 15 MIN: Performed by: SURGERY

## 2020-11-16 PROCEDURE — 2709999900 HC NON-CHARGEABLE SUPPLY: Performed by: SURGERY

## 2020-11-16 PROCEDURE — 15734 MUSCLE-SKIN GRAFT TRUNK: CPT | Performed by: SURGERY

## 2020-11-16 DEVICE — MESH SURG W8XL8CM FLAT SHT BIO-A: Type: IMPLANTABLE DEVICE | Site: ABDOMEN | Status: FUNCTIONAL

## 2020-11-16 RX ORDER — SODIUM CHLORIDE 0.9 % (FLUSH) 0.9 %
10 SYRINGE (ML) INJECTION EVERY 12 HOURS SCHEDULED
Status: DISCONTINUED | OUTPATIENT
Start: 2020-11-16 | End: 2020-11-16 | Stop reason: HOSPADM

## 2020-11-16 RX ORDER — MIDAZOLAM HYDROCHLORIDE 1 MG/ML
INJECTION INTRAMUSCULAR; INTRAVENOUS PRN
Status: DISCONTINUED | OUTPATIENT
Start: 2020-11-16 | End: 2020-11-16 | Stop reason: SDUPTHER

## 2020-11-16 RX ORDER — NEOSTIGMINE METHYLSULFATE 1 MG/ML
INJECTION, SOLUTION INTRAVENOUS PRN
Status: DISCONTINUED | OUTPATIENT
Start: 2020-11-16 | End: 2020-11-16 | Stop reason: SDUPTHER

## 2020-11-16 RX ORDER — SODIUM CHLORIDE, SODIUM LACTATE, POTASSIUM CHLORIDE, CALCIUM CHLORIDE 600; 310; 30; 20 MG/100ML; MG/100ML; MG/100ML; MG/100ML
INJECTION, SOLUTION INTRAVENOUS CONTINUOUS
Status: DISCONTINUED | OUTPATIENT
Start: 2020-11-16 | End: 2020-11-16 | Stop reason: HOSPADM

## 2020-11-16 RX ORDER — SCOLOPAMINE TRANSDERMAL SYSTEM 1 MG/1
1 PATCH, EXTENDED RELEASE TRANSDERMAL ONCE
Status: DISCONTINUED | OUTPATIENT
Start: 2020-11-16 | End: 2020-11-16 | Stop reason: HOSPADM

## 2020-11-16 RX ORDER — FENTANYL CITRATE 50 UG/ML
INJECTION, SOLUTION INTRAMUSCULAR; INTRAVENOUS PRN
Status: DISCONTINUED | OUTPATIENT
Start: 2020-11-16 | End: 2020-11-16 | Stop reason: SDUPTHER

## 2020-11-16 RX ORDER — HYDROCODONE BITARTRATE AND ACETAMINOPHEN 5; 325 MG/1; MG/1
1 TABLET ORAL EVERY 4 HOURS PRN
Qty: 18 TABLET | Refills: 0 | Status: SHIPPED | OUTPATIENT
Start: 2020-11-16 | End: 2020-11-19

## 2020-11-16 RX ORDER — ONDANSETRON 2 MG/ML
INJECTION INTRAMUSCULAR; INTRAVENOUS PRN
Status: DISCONTINUED | OUTPATIENT
Start: 2020-11-16 | End: 2020-11-16 | Stop reason: SDUPTHER

## 2020-11-16 RX ORDER — BUPIVACAINE HYDROCHLORIDE AND EPINEPHRINE 2.5; 5 MG/ML; UG/ML
INJECTION, SOLUTION EPIDURAL; INFILTRATION; INTRACAUDAL; PERINEURAL PRN
Status: DISCONTINUED | OUTPATIENT
Start: 2020-11-16 | End: 2020-11-16 | Stop reason: ALTCHOICE

## 2020-11-16 RX ORDER — ONDANSETRON 2 MG/ML
4 INJECTION INTRAMUSCULAR; INTRAVENOUS
Status: DISCONTINUED | OUTPATIENT
Start: 2020-11-16 | End: 2020-11-16 | Stop reason: HOSPADM

## 2020-11-16 RX ORDER — SCOLOPAMINE TRANSDERMAL SYSTEM 1 MG/1
PATCH, EXTENDED RELEASE TRANSDERMAL
Status: COMPLETED
Start: 2020-11-16 | End: 2020-11-16

## 2020-11-16 RX ORDER — SODIUM CHLORIDE 0.9 % (FLUSH) 0.9 %
10 SYRINGE (ML) INJECTION PRN
Status: DISCONTINUED | OUTPATIENT
Start: 2020-11-16 | End: 2020-11-16 | Stop reason: HOSPADM

## 2020-11-16 RX ORDER — GLYCOPYRROLATE 1 MG/5 ML
SYRINGE (ML) INTRAVENOUS PRN
Status: DISCONTINUED | OUTPATIENT
Start: 2020-11-16 | End: 2020-11-16 | Stop reason: SDUPTHER

## 2020-11-16 RX ORDER — ROCURONIUM BROMIDE 10 MG/ML
INJECTION, SOLUTION INTRAVENOUS PRN
Status: DISCONTINUED | OUTPATIENT
Start: 2020-11-16 | End: 2020-11-16 | Stop reason: SDUPTHER

## 2020-11-16 RX ORDER — LIDOCAINE HYDROCHLORIDE 20 MG/ML
INJECTION, SOLUTION INTRAVENOUS PRN
Status: DISCONTINUED | OUTPATIENT
Start: 2020-11-16 | End: 2020-11-16 | Stop reason: SDUPTHER

## 2020-11-16 RX ORDER — MEPERIDINE HYDROCHLORIDE 25 MG/ML
12.5 INJECTION INTRAMUSCULAR; INTRAVENOUS; SUBCUTANEOUS EVERY 5 MIN PRN
Status: DISCONTINUED | OUTPATIENT
Start: 2020-11-16 | End: 2020-11-16 | Stop reason: HOSPADM

## 2020-11-16 RX ORDER — DEXAMETHASONE SODIUM PHOSPHATE 10 MG/ML
INJECTION, SOLUTION INTRAMUSCULAR; INTRAVENOUS PRN
Status: DISCONTINUED | OUTPATIENT
Start: 2020-11-16 | End: 2020-11-16 | Stop reason: SDUPTHER

## 2020-11-16 RX ORDER — PROPOFOL 10 MG/ML
INJECTION, EMULSION INTRAVENOUS PRN
Status: DISCONTINUED | OUTPATIENT
Start: 2020-11-16 | End: 2020-11-16 | Stop reason: SDUPTHER

## 2020-11-16 RX ADMIN — Medication 2 G: at 09:15

## 2020-11-16 RX ADMIN — SODIUM CHLORIDE, POTASSIUM CHLORIDE, SODIUM LACTATE AND CALCIUM CHLORIDE: 600; 310; 30; 20 INJECTION, SOLUTION INTRAVENOUS at 07:40

## 2020-11-16 RX ADMIN — FENTANYL CITRATE 50 MCG: 50 INJECTION, SOLUTION INTRAMUSCULAR; INTRAVENOUS at 09:52

## 2020-11-16 RX ADMIN — LIDOCAINE HYDROCHLORIDE 100 MG: 20 INJECTION, SOLUTION INTRAVENOUS at 09:27

## 2020-11-16 RX ADMIN — DEXAMETHASONE SODIUM PHOSPHATE 10 MG: 10 INJECTION, SOLUTION INTRAMUSCULAR; INTRAVENOUS at 09:33

## 2020-11-16 RX ADMIN — SODIUM CHLORIDE, POTASSIUM CHLORIDE, SODIUM LACTATE AND CALCIUM CHLORIDE: 600; 310; 30; 20 INJECTION, SOLUTION INTRAVENOUS at 10:17

## 2020-11-16 RX ADMIN — Medication 0.3 MG: at 10:08

## 2020-11-16 RX ADMIN — HYDROMORPHONE HYDROCHLORIDE 0.5 MG: 1 INJECTION, SOLUTION INTRAMUSCULAR; INTRAVENOUS; SUBCUTANEOUS at 10:32

## 2020-11-16 RX ADMIN — FENTANYL CITRATE 100 MCG: 50 INJECTION, SOLUTION INTRAMUSCULAR; INTRAVENOUS at 09:27

## 2020-11-16 RX ADMIN — Medication 3 MG: at 10:08

## 2020-11-16 RX ADMIN — FENTANYL CITRATE 50 MCG: 50 INJECTION, SOLUTION INTRAMUSCULAR; INTRAVENOUS at 09:58

## 2020-11-16 RX ADMIN — HYDROMORPHONE HYDROCHLORIDE 0.5 MG: 1 INJECTION, SOLUTION INTRAMUSCULAR; INTRAVENOUS; SUBCUTANEOUS at 10:56

## 2020-11-16 RX ADMIN — PROPOFOL 200 MG: 10 INJECTION, EMULSION INTRAVENOUS at 09:27

## 2020-11-16 RX ADMIN — SODIUM CHLORIDE, POTASSIUM CHLORIDE, SODIUM LACTATE AND CALCIUM CHLORIDE: 600; 310; 30; 20 INJECTION, SOLUTION INTRAVENOUS at 09:15

## 2020-11-16 RX ADMIN — HYDROMORPHONE HYDROCHLORIDE 0.5 MG: 1 INJECTION, SOLUTION INTRAMUSCULAR; INTRAVENOUS; SUBCUTANEOUS at 10:44

## 2020-11-16 RX ADMIN — ROCURONIUM BROMIDE 40 MG: 10 SOLUTION INTRAVENOUS at 09:27

## 2020-11-16 RX ADMIN — ONDANSETRON 4 MG: 2 INJECTION INTRAMUSCULAR; INTRAVENOUS at 10:03

## 2020-11-16 RX ADMIN — MIDAZOLAM 2 MG: 1 INJECTION INTRAMUSCULAR; INTRAVENOUS at 09:15

## 2020-11-16 RX ADMIN — FENTANYL CITRATE 50 MCG: 50 INJECTION, SOLUTION INTRAMUSCULAR; INTRAVENOUS at 09:47

## 2020-11-16 ASSESSMENT — PULMONARY FUNCTION TESTS
PIF_VALUE: 33
PIF_VALUE: 28
PIF_VALUE: 1
PIF_VALUE: 28
PIF_VALUE: 32
PIF_VALUE: 41
PIF_VALUE: 32
PIF_VALUE: 11
PIF_VALUE: 33
PIF_VALUE: 0
PIF_VALUE: 20
PIF_VALUE: 32
PIF_VALUE: 26
PIF_VALUE: 1
PIF_VALUE: 33
PIF_VALUE: 34
PIF_VALUE: 22
PIF_VALUE: 27
PIF_VALUE: 31
PIF_VALUE: 2
PIF_VALUE: 5
PIF_VALUE: 29
PIF_VALUE: 43
PIF_VALUE: 27
PIF_VALUE: 0
PIF_VALUE: 30
PIF_VALUE: 27
PIF_VALUE: 25
PIF_VALUE: 26
PIF_VALUE: 12
PIF_VALUE: 24
PIF_VALUE: 3
PIF_VALUE: 0
PIF_VALUE: 22
PIF_VALUE: 1
PIF_VALUE: 33
PIF_VALUE: 22
PIF_VALUE: 31
PIF_VALUE: 3
PIF_VALUE: 2
PIF_VALUE: 20
PIF_VALUE: 27
PIF_VALUE: 23
PIF_VALUE: 32
PIF_VALUE: 29
PIF_VALUE: 27
PIF_VALUE: 32
PIF_VALUE: 32
PIF_VALUE: 1
PIF_VALUE: 22
PIF_VALUE: 20
PIF_VALUE: 10
PIF_VALUE: 1
PIF_VALUE: 32
PIF_VALUE: 25
PIF_VALUE: 33
PIF_VALUE: 1
PIF_VALUE: 20
PIF_VALUE: 31
PIF_VALUE: 1
PIF_VALUE: 24
PIF_VALUE: 33

## 2020-11-16 ASSESSMENT — PAIN DESCRIPTION - PROGRESSION
CLINICAL_PROGRESSION: GRADUALLY WORSENING
CLINICAL_PROGRESSION: GRADUALLY IMPROVING
CLINICAL_PROGRESSION: GRADUALLY WORSENING

## 2020-11-16 ASSESSMENT — PAIN DESCRIPTION - DESCRIPTORS
DESCRIPTORS: DISCOMFORT
DESCRIPTORS: DISCOMFORT
DESCRIPTORS: DISCOMFORT;PATIENT UNABLE TO DESCRIBE

## 2020-11-16 ASSESSMENT — PAIN DESCRIPTION - LOCATION
LOCATION: ABDOMEN

## 2020-11-16 ASSESSMENT — PAIN DESCRIPTION - FREQUENCY
FREQUENCY: INTERMITTENT
FREQUENCY: CONTINUOUS
FREQUENCY: INTERMITTENT

## 2020-11-16 ASSESSMENT — PAIN - FUNCTIONAL ASSESSMENT
PAIN_FUNCTIONAL_ASSESSMENT: ACTIVITIES ARE NOT PREVENTED
PAIN_FUNCTIONAL_ASSESSMENT: 0-10

## 2020-11-16 ASSESSMENT — ENCOUNTER SYMPTOMS: SHORTNESS OF BREATH: 0

## 2020-11-16 ASSESSMENT — PAIN DESCRIPTION - PAIN TYPE
TYPE: SURGICAL PAIN

## 2020-11-16 ASSESSMENT — PAIN DESCRIPTION - ONSET
ONSET: GRADUAL
ONSET: ON-GOING
ONSET: GRADUAL

## 2020-11-16 ASSESSMENT — PAIN SCALES - GENERAL
PAINLEVEL_OUTOF10: 8
PAINLEVEL_OUTOF10: 9
PAINLEVEL_OUTOF10: 7

## 2020-11-16 NOTE — PROGRESS NOTES
Ambulated in the hallway, steady gait. Voided. Requesting discharge, met criteria. Instructions reviewed including escribed med and pt has an active MyChart.

## 2020-11-16 NOTE — OP NOTE
DATE OF PROCEDURE: 11/16/2020    SURGEON: Lloyd Lundy M.D.    ASSISTANT: marianela    PREOPERATIVE DIAGNOSIS: Large paraesophageal midline hiatal hernia with severe reflux and muscle disruption. POSTOPERATIVE DIAGNOSIS: same    OPERATION:   1.) Laparoscopic midline paraesophageal hiatal hernia repair  with mesh  2.) nicole cutaneous flap and  3.) truncal vagotomy      ANESTHESIA: General.    ESTIMATED BLOOD LOSS: 40 mL. COMPLICATIONS: None. FLUIDS: Crystalloid. DISPOSITION: Will be admitted to the floor for routine postoperative care. SPECIMEN: None. INDICATION: This is a patient who came in with the aforementioned  diagnosis. The patient had severe reflux. I explained the risks, benefits,  potential outcomes, alternative treatment to aforementioned procedure, and  she agreed to proceed, understanding those risks and potential outcomes. DESCRIPTION OF PROCEDURE: The patient was brought to the operative suite,  was placed under general anesthesia, was given preoperative antibiotics  within 30 minutes of incision, then had bilateral PCDs placed. Once this was done a 5 mm incision was made in the supraumbilical area. After local anesthetic was infiltrated into the abdominal wall, a Veress  needle was used to pass into the peritoneum. CO2 was then used to insufflate  the abdomen to a pressure of 15 mmHg. At this time, the Veress needle was  removed, and an 5 mm trocar was placed through this incision. Four  additional trocars were placed, 2 in the right lateral abdomen, one a 5 mm  trocar that was scythed through the rectus sheath at 2 different levels. An  additional 5 mm trocar was placed in the right lateral abdomen. An 5 mm  trocar was also placed in the left upper quadrant of the abdomen. At this time,  a Pierre retractor was put into this to retract the liver,  exposing the hiatal hernia. Hiatal hernia was identified.  The hernia sac  and its crural attachments were taken down using electrocautery and blunt  dissection. We were able to expose the entire esophagus and stomach that was  into this hiatal hernia and reduce it into the abdomen. We bluntly dissected  around the entire esophagus, mobilizing that down into the abdomen to have  approximately 3 cm of intraabdominal esophagus. At this time, we repaired the crura in a posterior fashion with a running,  nonabsorbable V-Loc suture. Once this was completed, a Bio A mesh was cut to keyhole around the esophagus and was placed posteriorly and sewn in place with v lock vicryl sutures. A falciform myofascial flap was then placed by taking the falciform down from abdominal wall and was placed posteriorly around to buttress our repair and protect the esophagus from the mesh. This was to support our repair as well as to  anchor the stomach and esophagus intra-abdominally. This was also done with  V paola sutures. The anterior and posterior vagus were then ID'd and removed 1cm of each with cautery. Once this was completed, an EGD scope was done to assure that  this was proper. The repair did not appear to be too tight, and the  endoscope passed easily into the stomach. It was retroflexed at that point  and the hiatus was intact without appearing to be too tight. The endoscope  was then removed. . Pneumoperitoneum was  evacuated. All lap and suture counts were correct, and the trocar sites were  closed with 4-0 Monocryl sutures in a subcuticular fashion, and then  Dermabond was placed. The patient tolerated the procedure well and was taken to PACU.

## 2020-11-16 NOTE — ANESTHESIA PRE PROCEDURE
Department of Anesthesiology  Preprocedure Note       Name:  Floretta Denver   Age:  38 y.o.  :  1980                                          MRN:  [id number]         Date:  2020      Surgeon: Radha Thrasher):  Ulysses Silva, MD    Procedure: Procedure(s):  LAPAROSCOPIC HIATAL HERNIA REPAIR WITH MESH, TRUNCAL VAGOTOMY, AND POSS TIGHTENING OF GASTROJEJUNOSTOMY    Medications prior to admission:   Prior to Admission medications    Medication Sig Start Date End Date Taking? Authorizing Provider   omeprazole (PRILOSEC) 20 MG delayed release capsule Take 1 capsule by mouth 2 times daily 10/9/20   Ulysses Silva, MD   sucralfate (CARAFATE) 1 GM tablet Take 0.5 tablets by mouth 4 times daily 10/9/20   Ulysses Silva, MD   lisinopril (PRINIVIL;ZESTRIL) 5 MG tablet 10 mg  20   Historical Provider, MD   albuterol sulfate  (90 Base) MCG/ACT inhaler TAKE 1 PUFF INHALED 4 TIMES PER DAY TAKE AS NEEDED 20   Historical Provider, MD   Diclofenac Sodium  MG TB24 TAKE 1 TABLET, 1 TIME PER DAY. WITH FOOD 19   Historical Provider, MD   LORATADINE-D 24HR  MG per extended release tablet TAKE ONE TABLET BY MOUTH EVERY DAY AS NEEDED 3/20/19   Historical Provider, MD   fluticasone Oren Bigger) 50 MCG/ACT nasal spray as needed 3/4/19   Historical Provider, MD   Cholecalciferol (VITAMIN D3) 5000 units TABS Take by mouth daily    Historical Provider, MD   Multiple Vitamins-Minerals (THERAPEUTIC MULTIVITAMIN-MINERALS) tablet Take 1 tablet by mouth 2 times daily Indications: bariatric advantage    Historical Provider, MD   acetaminophen (APAP EXTRA STRENGTH) 500 MG tablet Take 1 tablet by mouth every 6 hours as needed for Pain 5/31/15   Chad Calle DO       Current medications:    No current facility-administered medications for this visit. No current outpatient medications on file.      Facility-Administered Medications Ordered in Other Visits   Medication Dose Route Frequency Provider Last Rate Last Dose    ceFAZolin (ANCEF) 2 g in sterile water 20 mL IV syringe  2 g Intravenous On Call to [address] [place] [address] MD Ludy        lactated ringers infusion   Intravenous Continuous Martínez Ulloa MD 75 mL/hr at 20 0740      sodium chloride flush 0.9 % injection 10 mL  10 mL Intravenous 2 times per day Martínez Ulloa MD        sodium chloride flush 0.9 % injection 10 mL  10 mL Intravenous PRN Martínez Ulloa MD        scopolamine (TRANSDERM-SCOP) 1 MG/3DAYS transdermal patch                Allergies: Allergies   Allergen Reactions    Ibuprofen Swelling       Problem List:    Patient Active Problem List   Diagnosis Code    S/P gastric bypass Z98.84    Gastroesophageal reflux disease without esophagitis K21.9       Past Medical History:        Diagnosis Date    Hypertension     during pregnancy       Past Surgical History:        Procedure Laterality Date    ABDOMEN SURGERY       SECTION  ,,,,    KATRINA-EN-Y GASTRIC BYPASS  2017    Laparascopic    UPPER GASTROINTESTINAL ENDOSCOPY  2016    [facility] UPPER GASTROINTESTINAL ENDOSCOPY N/A 10/9/2020    EGD BIOPSY performed by Martínez Ulloa MD at [address] EXTRACTION         Social History:    Social History     Tobacco Use    Smoking status: Former Smoker     Types: Cigarettes    Smokeless tobacco: Never Used   Substance Use Topics    Alcohol use: No                                Counseling given: Not Answered      Vital Signs (Current): There were no vitals filed for this visit.                                            BP Readings from Last 3 Encounters:   20 132/77   20 (!) 140/65   10/14/20 (!) 140/78       NPO Status:                                                                                 BMI:   Wt Readings from Last 3 Encounters:   20 240 lb 5 oz (109 kg)   10/14/20 235 lb (106.6 kg)   10/09/20 235 lb (106.6 kg)     There is no height or weight on file to calculate BMI.    CBC:   Lab Results   Component Value Date    WBC 5.0 11/09/2020    RBC 4.40 11/09/2020    HGB 12.6 11/09/2020    HCT 38.2 11/09/2020    MCV 86.8 11/09/2020    RDW 12.3 11/09/2020     11/09/2020       CMP:   Lab Results   Component Value Date     11/09/2020    K 4.0 11/09/2020     11/09/2020    CO2 22 11/09/2020    BUN 10 11/09/2020    CREATININE 0.7 11/09/2020    GFRAA >60 11/09/2020    LABGLOM >60 11/09/2020    GLUCOSE 67 11/09/2020    PROT 7.0 09/21/2020    CALCIUM 8.5 11/09/2020    BILITOT 0.3 09/21/2020    ALKPHOS 126 09/21/2020    AST 22 09/21/2020    ALT 41 09/21/2020       POC Tests: No results for input(s): POCGLU, POCNA, POCK, POCCL, POCBUN, POCHEMO, POCHCT in the last 72 hours. Coags: No results found for: PROTIME, INR, APTT    HCG (If Applicable):   Lab Results   Component Value Date    PREGTESTUR NEGATIVE 11/16/2020        ABGs: No results found for: PHART, PO2ART, HOL4QQW, CER2AEL, BEART, Z5NPEDJM     Type & Screen (If Applicable):  No results found for: LABABO, LABRH    Drug/Infectious Status (If Applicable):  No results found for: HIV, HEPCAB    COVID-19 Screening (If Applicable):   Lab Results   Component Value Date    COVID19 Not Detected 11/10/2020         Anesthesia Evaluation  Patient summary reviewed no history of anesthetic complications:   Airway: Mallampati: II  TM distance: >3 FB   Neck ROM: full  Mouth opening: > = 3 FB Dental: normal exam         Pulmonary: breath sounds clear to auscultation      (-) COPD and shortness of breath                          ROS comment: Former smoker   Cardiovascular:  Exercise tolerance: good (>4 METS),   (+) hypertension (PREGNANCY): moderate,       ECG reviewed  Rhythm: regular  Rate: normal  Echocardiogram reviewed                  Neuro/Psych:   Negative Neuro/Psych ROS              GI/Hepatic/Renal:   (+) GERD:,      (-) liver disease and no renal disease      ROS comment: S/P Gastric Bypass. Endo/Other: Negative Endo/Other ROS                    Abdominal:   (+) obese,         Vascular: negative vascular ROS. Anesthesia Plan      general     ASA 2       Induction: intravenous. BIS  MIPS: Postoperative opioids intended and Prophylactic antiemetics administered. Anesthetic plan and risks discussed with patient. Plan discussed with CRNA.                   Jes Lynne MD   11/16/2020

## 2020-11-16 NOTE — H&P
General Surgery History and Physical     Patient's Name/Date of Birth: Lianne Quarles / 1980     Date: 20      Surgeon: Senia Lubin M.D.     PCP: Mishel Keller DO     Chief Complaint: hiatal hernia s/p sygb     HPI:   Lianne Quarles is a 38 y.o. female who presents for evaluation of symptomatic hiatal hernia that was responsive to medical treatment but has become recalcitrant. Has severe GERD, EGD showed 6cm, Has not had manometry. Has had no imaging. Timing is constant, radiation to epigastrium, alleviated by nothing and started years ago and severity is 2-7/10         Past Medical History        Past Medical History:   Diagnosis Date    Hypertension       during pregnancy           Past Surgical History         Past Surgical History:   Procedure Laterality Date    ABDOMEN SURGERY         SECTION   ,,,,    KATRINA-EN-Y GASTRIC BYPASS   2017     Laparascopic    UPPER GASTROINTESTINAL ENDOSCOPY   2016     Dr. Jeanna Catherine N/A 10/9/2020     EGD BIOPSY performed by Bar Sage MD at [place] [address] EXTRACTION               Current Facility-Administered Medications          Current Outpatient Medications   Medication Sig Dispense Refill    omeprazole (PRILOSEC) 20 MG delayed release capsule Take 1 capsule by mouth 2 times daily 30 capsule 3    sucralfate (CARAFATE) 1 GM tablet Take 0.5 tablets by mouth 4 times daily 120 tablet 3    lisinopril (PRINIVIL;ZESTRIL) 5 MG tablet TAKE 1 TABLET BY MOUTH EVERY DAY        albuterol sulfate  (90 Base) MCG/ACT inhaler TAKE 1 PUFF INHALED 4 TIMES PER DAY TAKE AS NEEDED        Diclofenac Sodium  MG TB24 TAKE 1 TABLET, 1 TIME PER DAY.  WITH FOOD   2    LORATADINE-D 24HR  MG per extended release tablet TAKE ONE TABLET BY MOUTH EVERY DAY AS NEEDED   0    fluticasone (FLONASE) 50 MCG/ACT nasal spray as needed   6    Cholecalciferol (VITAMIN D3) 5000 units TABS Take by mouth daily        Multiple Vitamins-Minerals (THERAPEUTIC MULTIVITAMIN-MINERALS) tablet Take 1 tablet by mouth 2 times daily Indications: bariatric advantage        acetaminophen (APAP EXTRA STRENGTH) 500 MG tablet Take 1 tablet by mouth every 6 hours as needed for Pain 40 tablet 0      No current facility-administered medications for this visit.                 Allergies   Allergen Reactions    Ibuprofen Swelling         The patient has a family history that is negative for severe cardiovascular or respiratory issues, negative for reaction to anesthesia.     Social History               Socioeconomic History    Marital status: Single       Spouse name: Not on file    Number of children: Not on file    Years of education: Not on file    Highest education level: Not on file   Occupational History    Not on file   Social Needs    Financial resource strain: Not on file    Food insecurity       Worry: Not on file       Inability: Not on file    Transportation needs       Medical: Not on file       Non-medical: Not on file   Tobacco Use    Smoking status: Former Smoker       Types: Cigarettes    Smokeless tobacco: Never Used   Substance and Sexual Activity    Alcohol use: No    Drug use: No    Sexual activity: Yes       Partners: Male   Lifestyle    Physical activity       Days per week: Not on file       Minutes per session: Not on file    Stress: Not on file   Relationships    Social connections       Talks on phone: Not on file       Gets together: Not on file       Attends [employer] service: Not on file       Active member of club or organization: Not on file       Attends meetings of clubs or organizations: Not on file       Relationship status: Not on file    Intimate partner violence       Fear of current or ex partner: Not on file       Emotionally abused: Not on file       Physically abused: Not on file       Forced sexual activity: Not on file   Other Topics Concern    Not on file   Social History Narrative    Not on file                 Review of Systems  Review of Systems -  General ROS: negative for - chills, fatigue or malaise  ENT ROS: negative for - hearing change, nasal congestion or nasal discharge  Allergy and Immunology ROS: negative for - hives, itchy/watery eyes or nasal congestion  Hematological and Lymphatic ROS: negative for - blood clots, blood transfusions, bruising or fatigue  Endocrine ROS: negative for - malaise/lethargy, mood swings, palpitations or polydipsia/polyuria  Respiratory ROS: negative for - sputum changes, stridor, tachypnea or wheezing  Cardiovascular ROS: negative for - irregular heartbeat, loss of consciousness, murmur or orthopnea  Gastrointestinal ROS: negative for - constipation, diarrhea, gas/bloating, or hematemesis, positive for heartburn and other epigastric pain  Genito-Urinary ROS: negative for -  genital discharge, genital ulcers or hematuria  Musculoskeletal ROS: negative for - gait disturbance, muscle pain or muscular weakness     Physical exam:   /77   Pulse 75   Temp 97.7 °F (36.5 °C) (Infrared)   Resp 16   SpO2 99%     General appearance:  NAD  Pyscho/social: negative for tremors and hallucinations  Head: NCAT, PERRLA, EOMI, red conjunctiva  Neck: supple, no masses  Lungs: CTAB, equal chest rise bilateral  Heart: Reg rate  Abdomen: soft, nontender, nondistended  Skin; no lesions  Gu: no cva tenderness  Extremities: extremities normal, atraumatic, no cyanosis or edema        Assessment:  38 y.o. female with  symptomatic hiatal hernia s/p rygb with recurrent marginal ulcers     Plan:   TO OR for HHR with mesh, truncal vagotomy and possible tightening of gastrojejunstomy   Discussed the risk, benefits and alternatives of surgery including wound infections, bleeding, scar and hernia formation and the risks of general anesthetic including MI, CVA, sudden death or reactions to anesthetic medications.  The patient understands the risks and alternatives and the possibility of converting to an open procedure.  All questions were answered to the patient's satisfaction and they freely signed the consent.        Leia Poe MD

## 2020-11-17 ENCOUNTER — TELEPHONE (OUTPATIENT)
Dept: BARIATRICS/WEIGHT MGMT | Age: 40
End: 2020-11-17

## 2020-11-17 NOTE — TELEPHONE ENCOUNTER
Pt called in to make a f/u appt from her hernia surgery. I informed pt she already has a f/u post op appt scheduled for 12/2. She stated it is supposed to be 1 week after surgery. I explained to pt the doctors are not in the office the week of Thanksgiving, but the office staff will be in M-W of that week if she had any problems she could call us during office hours and we could contact Dr Alyce Chang. She was told if she had an emergency after office hours or during the holiday, she needed to go to [facility]. I explained the Dec 2 date was the next available office hours for Dr Alyce Chang here. She understood. Pt also has some medical questions and I transferred her call to [facility] (nurse). I told pt she was currently at the hospital, but to leave a message and she would call her back upon her return.

## 2020-11-17 NOTE — TELEPHONE ENCOUNTER
Patient called in and had post op questions. She lost her hand out about post op HHR diet. I discussed it with her at length and will email her new copy.

## 2020-12-02 ENCOUNTER — OFFICE VISIT (OUTPATIENT)
Dept: BARIATRICS/WEIGHT MGMT | Age: 40
End: 2020-12-02
Payer: COMMERCIAL

## 2020-12-02 VITALS
WEIGHT: 229.5 LBS | SYSTOLIC BLOOD PRESSURE: 131 MMHG | BODY MASS INDEX: 39.18 KG/M2 | RESPIRATION RATE: 20 BRPM | HEART RATE: 81 BPM | HEIGHT: 64 IN | TEMPERATURE: 97.5 F | DIASTOLIC BLOOD PRESSURE: 58 MMHG

## 2020-12-02 PROCEDURE — 99211 OFF/OP EST MAY X REQ PHY/QHP: CPT

## 2020-12-02 PROCEDURE — 99024 POSTOP FOLLOW-UP VISIT: CPT | Performed by: SURGERY

## 2020-12-02 NOTE — PATIENT INSTRUCTIONS
Please continue to take your vitamin and mineral supplements as instructed. If you received a blood work prescription today for laboratory monitoring due prior to your next routine follow-up visit, please have this blood work obtained 10 to 14 days prior to your next visit. It is important to fast for 12 hours prior to routine weight loss surgery blood work, EXCEPT for drinking water, to ensure accuracy of results. Please report nausea, vomiting, abdominal pain, or any other problems you experience to your surgeon. For problems related to weight loss surgery, it is best to go to [address] [place] [address] Emergency Department and have your surgeon paged.

## 2020-12-02 NOTE — PROGRESS NOTES
Surgery Progress Note            Chief complaint:   Patient Active Problem List   Diagnosis    S/P gastric bypass    Gastroesophageal reflux disease without esophagitis    Marginal ulcer    Hiatal hernia       S: doing well    O:   Vitals:    12/02/20 1347   BP: (!) 131/58   Pulse: 81   Resp: 20   Temp: 97.5 °F (36.4 °C)     No intake or output data in the 24 hours ending 12/02/20 1356        Labs:  No results for input(s): WBC, HGB, HCT in the last 72 hours. Invalid input(s): PLR  Lab Results   Component Value Date    CREATININE 0.7 11/09/2020    BUN 10 11/09/2020     11/09/2020    K 4.0 11/09/2020     11/09/2020    CO2 22 11/09/2020     No results for input(s): LIPASE, AMYLASE in the last 72 hours. Physical exam:   BP (!) 131/58 (Site: Left Upper Arm, Position: Sitting, Cuff Size: Large Adult)   Pulse 81   Temp 97.5 °F (36.4 °C) (Temporal)   Resp 20   Ht 5' 4\" (1.626 m)   Wt 229 lb 8 oz (104.1 kg)   BMI 39.39 kg/m²   General appearance: NAD  Head: NCAT  Neck: supple, no masses  Lungs: equal chest rise bilateral  Heart: S1S2 present  Abdomen: soft, nontender, nondistended  Skin; no new lesions, incisions clean and intact  Gu: no cva tenderness  Extremities: extremities normal, atraumatic, no cyanosis or edema    A:  Post op lap HHR with mesh and truncal vagotomy    P: follow up as needed.      Claudia Pate MD  12/2/2020

## 2020-12-02 NOTE — PROGRESS NOTES
2 week post op HHR. Pt states that she's nauseated at time. Incisions are healing well. Opioid Questions for BSTOP:  1. Enter the total number of opiods used after discharge  12  2.  Did patient return unused opiods to a verified prescription disposal location? 0

## 2020-12-08 ENCOUNTER — HOSPITAL ENCOUNTER (OUTPATIENT)
Dept: MAMMOGRAPHY | Age: 40
Discharge: HOME | End: 2020-12-10
Payer: COMMERCIAL

## 2020-12-08 PROCEDURE — 77063 BREAST TOMOSYNTHESIS BI: CPT

## 2021-01-06 ENCOUNTER — HOSPITAL ENCOUNTER (OUTPATIENT)
Age: 41
Discharge: HOME | End: 2021-01-06
Payer: COMMERCIAL

## 2021-01-06 DIAGNOSIS — Z01.818 PREOP TESTING: ICD-10-CM

## 2021-01-06 PROCEDURE — U0003 INFECTIOUS AGENT DETECTION BY NUCLEIC ACID (DNA OR RNA); SEVERE ACUTE RESPIRATORY SYNDROME CORONAVIRUS 2 (SARS-COV-2) (CORONAVIRUS DISEASE [COVID-19]), AMPLIFIED PROBE TECHNIQUE, MAKING USE OF HIGH THROUGHPUT TECHNOLOGIES AS DESCRIBED BY CMS-2020-01-R: HCPCS

## 2021-01-08 ENCOUNTER — HOSPITAL ENCOUNTER (OUTPATIENT)
Age: 41
Discharge: HOME | End: 2021-01-08
Payer: COMMERCIAL

## 2021-01-08 DIAGNOSIS — Z01.818 PREOP TESTING: ICD-10-CM

## 2021-01-08 DIAGNOSIS — K91.2 MALNUTRITION FOLLOWING GASTROINTESTINAL SURGERY: ICD-10-CM

## 2021-01-08 LAB
ANION GAP SERPL CALCULATED.3IONS-SCNC: 8 MMOL/L (ref 7–16)
BUN BLDV-MCNC: 8 MG/DL (ref 6–20)
CALCIUM SERPL-MCNC: 8.6 MG/DL (ref 8.6–10.2)
CHLORIDE BLD-SCNC: 107 MMOL/L (ref 98–107)
CO2: 23 MMOL/L (ref 22–29)
CREAT SERPL-MCNC: 0.7 MG/DL (ref 0.5–1)
GFR AFRICAN AMERICAN: >60
GFR NON-AFRICAN AMERICAN: >60 ML/MIN/1.73
GLUCOSE BLD-MCNC: 79 MG/DL (ref 74–99)
HCT VFR BLD CALC: 38.8 % (ref 34–48)
HEMOGLOBIN: 13.2 G/DL (ref 11.5–15.5)
MCH RBC QN AUTO: 29.1 PG (ref 26–35)
MCHC RBC AUTO-ENTMCNC: 34 % (ref 32–34.5)
MCV RBC AUTO: 85.5 FL (ref 80–99.9)
PDW BLD-RTO: 12.6 FL (ref 11.5–15)
PLATELET # BLD: 311 E9/L (ref 130–450)
PMV BLD AUTO: 9.8 FL (ref 7–12)
POTASSIUM REFLEX MAGNESIUM: 4 MMOL/L (ref 3.5–5)
RBC # BLD: 4.54 E12/L (ref 3.5–5.5)
SARS-COV-2: NOT DETECTED
SODIUM BLD-SCNC: 138 MMOL/L (ref 132–146)
SOURCE: NORMAL
WBC # BLD: 4.5 E9/L (ref 4.5–11.5)

## 2021-01-08 PROCEDURE — 85027 COMPLETE CBC AUTOMATED: CPT

## 2021-01-08 PROCEDURE — 80048 BASIC METABOLIC PNL TOTAL CA: CPT

## 2021-01-08 PROCEDURE — 36415 COLL VENOUS BLD VENIPUNCTURE: CPT

## 2021-01-08 NOTE — PROGRESS NOTES
[facility]                                                                                                                    PRE OP INSTRUCTIONS FOR  Madai Terry        Date: 1/8/2021    Date of surgery: 1/11/2021   Arrival Time: Hospital will call you between 5pm and 7pm with your final arrival time for surgery    1. Do not eat or drink anything after 12 prior to surgery. This includes no water, chewing gum, mints or ice chips. 2. Take the following medications with a small sip of water on the morning of Surgery: none     3. Diabetics may take evening dose of insulin but none after midnight. If you feel symptomatic or low blood sugar morning of surgery drink 1-2 ounces of apple juice only. 4. Aspirin, Ibuprofen, Advil, Naproxen, Vitamin E and other Anti-inflammatory products should be stopped  before surgery  as directed by your physician. Take Tylenol only unless instructed otherwise by your surgeon. 5. Check with your Doctor regarding stopping Plavix, Coumadin, Lovenox, Eliquis, Effient, or other blood thinners. 6. Do not smoke,use illicit drugs and do not drink any alcoholic beverages 24 hours prior to surgery. 7. You may brush your teeth the morning of surgery. DO NOT SWALLOW WATER    8. You MUST make arrangements for a responsible adult to take you home after your surgery. You will not be allowed to leave alone or drive yourself home. It is strongly suggested someone stay with you the first 24 hrs. Your surgery will be cancelled if you do not have a ride home. 9. PEDIATRIC PATIENTS ONLY:  A parent/legal guardian must accompany a child scheduled for surgery and plan to stay at the hospital until the child is discharged. Please do not bring other children with you.     10. Please wear simple, loose fitting clothing to the hospital.  [place] Pedlar not bring valuables (money, credit cards, checkbooks, etc.) Do not wear any makeup (including no eye makeup) or nail polish on your fingers or toes. 11. DO NOT wear any jewelry or piercings on day of surgery. All body piercing jewelry must be removed. 12. Shower the night before surgery with ___Antibacterial soap /SHAISTA WIPES________    13. TOTAL JOINT REPLACEMENT/HYSTERECTOMY PATIENTS ONLY---Remember to bring Blood Bank bracelet to the hospital on the day of surgery. 14. If you have a Living Will and Durable Power of  for Healthcare, please bring in a copy. 15. If appropriate bring crutches, inspirex, WALKER, CANE etc... 12. Notify your Surgeon if you develop any illness between now and surgery time, cough, cold, fever, sore throat, nausea, vomiting, etc.  Please notify your surgeon if you experience dizziness, shortness of breath or blurred vision between now & the time of your surgery. 17. If you have ___dentures, they will be removed before going to the OR; we will provide you a container. If you wear ___contact lenses or ___glasses, they will be removed; please bring a case for them. 18. To provide excellent care visitors will be limited to 2 in the room at any given time. 19. Please bring picture ID and insurance card. 20. Sleep apnea patients need to bring CPAP AND SETTINGS to hospital on day of surgery. 21. During flu season no children under the age of 15 are permitted in the hospital for the safety of all patients. 22. Other                  Please call AMBULATORY CARE if you have any further questions.    [address]     [address]

## 2021-01-11 ENCOUNTER — HOSPITAL ENCOUNTER (OUTPATIENT)
Age: 41
Setting detail: OUTPATIENT SURGERY
Discharge: HOME | End: 2021-01-11
Attending: OBSTETRICS & GYNECOLOGY | Admitting: OBSTETRICS & GYNECOLOGY
Payer: COMMERCIAL

## 2021-01-11 ENCOUNTER — ANESTHESIA (OUTPATIENT)
Dept: OPERATING ROOM | Age: 41
End: 2021-01-11
Payer: COMMERCIAL

## 2021-01-11 ENCOUNTER — ANESTHESIA EVENT (OUTPATIENT)
Dept: OPERATING ROOM | Age: 41
End: 2021-01-11
Payer: COMMERCIAL

## 2021-01-11 VITALS
OXYGEN SATURATION: 100 % | RESPIRATION RATE: 5 BRPM | DIASTOLIC BLOOD PRESSURE: 49 MMHG | SYSTOLIC BLOOD PRESSURE: 99 MMHG

## 2021-01-11 VITALS
TEMPERATURE: 97.1 F | WEIGHT: 239 LBS | OXYGEN SATURATION: 99 % | HEIGHT: 64 IN | DIASTOLIC BLOOD PRESSURE: 60 MMHG | RESPIRATION RATE: 16 BRPM | SYSTOLIC BLOOD PRESSURE: 112 MMHG | HEART RATE: 80 BPM | BODY MASS INDEX: 40.8 KG/M2

## 2021-01-11 DIAGNOSIS — Z01.818 PREOP TESTING: Primary | ICD-10-CM

## 2021-01-11 LAB — HCG(URINE) PREGNANCY TEST: NEGATIVE

## 2021-01-11 PROCEDURE — 7100000000 HC PACU RECOVERY - FIRST 15 MIN: Performed by: OBSTETRICS & GYNECOLOGY

## 2021-01-11 PROCEDURE — 3700000000 HC ANESTHESIA ATTENDED CARE: Performed by: OBSTETRICS & GYNECOLOGY

## 2021-01-11 PROCEDURE — 3600000003 HC SURGERY LEVEL 3 BASE: Performed by: OBSTETRICS & GYNECOLOGY

## 2021-01-11 PROCEDURE — 81025 URINE PREGNANCY TEST: CPT

## 2021-01-11 PROCEDURE — 2580000003 HC RX 258: Performed by: ANESTHESIOLOGY

## 2021-01-11 PROCEDURE — 3600000013 HC SURGERY LEVEL 3 ADDTL 15MIN: Performed by: OBSTETRICS & GYNECOLOGY

## 2021-01-11 PROCEDURE — 7100000011 HC PHASE II RECOVERY - ADDTL 15 MIN: Performed by: OBSTETRICS & GYNECOLOGY

## 2021-01-11 PROCEDURE — 7100000010 HC PHASE II RECOVERY - FIRST 15 MIN: Performed by: OBSTETRICS & GYNECOLOGY

## 2021-01-11 PROCEDURE — 7100000001 HC PACU RECOVERY - ADDTL 15 MIN: Performed by: OBSTETRICS & GYNECOLOGY

## 2021-01-11 PROCEDURE — 2709999900 HC NON-CHARGEABLE SUPPLY: Performed by: OBSTETRICS & GYNECOLOGY

## 2021-01-11 PROCEDURE — 6360000002 HC RX W HCPCS: Performed by: NURSE ANESTHETIST, CERTIFIED REGISTERED

## 2021-01-11 PROCEDURE — 3700000001 HC ADD 15 MINUTES (ANESTHESIA): Performed by: OBSTETRICS & GYNECOLOGY

## 2021-01-11 PROCEDURE — 6360000002 HC RX W HCPCS

## 2021-01-11 PROCEDURE — 2580000003 HC RX 258: Performed by: OBSTETRICS & GYNECOLOGY

## 2021-01-11 RX ORDER — SODIUM CHLORIDE 0.9 % (FLUSH) 0.9 %
10 SYRINGE (ML) INJECTION PRN
Status: DISCONTINUED | OUTPATIENT
Start: 2021-01-11 | End: 2021-01-11 | Stop reason: HOSPADM

## 2021-01-11 RX ORDER — SODIUM CHLORIDE 0.9 % (FLUSH) 0.9 %
10 SYRINGE (ML) INJECTION EVERY 12 HOURS SCHEDULED
Status: DISCONTINUED | OUTPATIENT
Start: 2021-01-11 | End: 2021-01-11 | Stop reason: HOSPADM

## 2021-01-11 RX ORDER — FENTANYL CITRATE 50 UG/ML
INJECTION, SOLUTION INTRAMUSCULAR; INTRAVENOUS PRN
Status: DISCONTINUED | OUTPATIENT
Start: 2021-01-11 | End: 2021-01-11 | Stop reason: SDUPTHER

## 2021-01-11 RX ORDER — MIDAZOLAM HYDROCHLORIDE 1 MG/ML
INJECTION INTRAMUSCULAR; INTRAVENOUS PRN
Status: DISCONTINUED | OUTPATIENT
Start: 2021-01-11 | End: 2021-01-11 | Stop reason: SDUPTHER

## 2021-01-11 RX ORDER — SODIUM CHLORIDE, SODIUM LACTATE, POTASSIUM CHLORIDE, CALCIUM CHLORIDE 600; 310; 30; 20 MG/100ML; MG/100ML; MG/100ML; MG/100ML
INJECTION, SOLUTION INTRAVENOUS CONTINUOUS
Status: DISCONTINUED | OUTPATIENT
Start: 2021-01-11 | End: 2021-01-11 | Stop reason: HOSPADM

## 2021-01-11 RX ORDER — LIDOCAINE HYDROCHLORIDE 20 MG/ML
INJECTION, SOLUTION INTRAVENOUS PRN
Status: DISCONTINUED | OUTPATIENT
Start: 2021-01-11 | End: 2021-01-11 | Stop reason: SDUPTHER

## 2021-01-11 RX ORDER — ONDANSETRON 2 MG/ML
INJECTION INTRAMUSCULAR; INTRAVENOUS PRN
Status: DISCONTINUED | OUTPATIENT
Start: 2021-01-11 | End: 2021-01-11 | Stop reason: SDUPTHER

## 2021-01-11 RX ORDER — ONDANSETRON 2 MG/ML
4 INJECTION INTRAMUSCULAR; INTRAVENOUS EVERY 6 HOURS PRN
Status: DISCONTINUED | OUTPATIENT
Start: 2021-01-11 | End: 2021-01-11 | Stop reason: HOSPADM

## 2021-01-11 RX ORDER — FENTANYL CITRATE 50 UG/ML
25 INJECTION, SOLUTION INTRAMUSCULAR; INTRAVENOUS EVERY 5 MIN PRN
Status: DISCONTINUED | OUTPATIENT
Start: 2021-01-11 | End: 2021-01-11 | Stop reason: HOSPADM

## 2021-01-11 RX ORDER — PROPOFOL 10 MG/ML
INJECTION, EMULSION INTRAVENOUS PRN
Status: DISCONTINUED | OUTPATIENT
Start: 2021-01-11 | End: 2021-01-11 | Stop reason: SDUPTHER

## 2021-01-11 RX ADMIN — MIDAZOLAM 2 MG: 1 INJECTION INTRAMUSCULAR; INTRAVENOUS at 07:38

## 2021-01-11 RX ADMIN — LIDOCAINE HYDROCHLORIDE 100 MG: 20 INJECTION, SOLUTION INTRAVENOUS at 07:47

## 2021-01-11 RX ADMIN — PROPOFOL 200 MG: 10 INJECTION, EMULSION INTRAVENOUS at 07:47

## 2021-01-11 RX ADMIN — SODIUM CHLORIDE, POTASSIUM CHLORIDE, SODIUM LACTATE AND CALCIUM CHLORIDE: 600; 310; 30; 20 INJECTION, SOLUTION INTRAVENOUS at 07:16

## 2021-01-11 RX ADMIN — FENTANYL CITRATE 50 MCG: 50 INJECTION, SOLUTION INTRAMUSCULAR; INTRAVENOUS at 07:52

## 2021-01-11 RX ADMIN — FENTANYL CITRATE 50 MCG: 50 INJECTION, SOLUTION INTRAMUSCULAR; INTRAVENOUS at 08:14

## 2021-01-11 RX ADMIN — ONDANSETRON 4 MG: 2 INJECTION INTRAMUSCULAR; INTRAVENOUS at 08:05

## 2021-01-11 RX ADMIN — SODIUM CHLORIDE, POTASSIUM CHLORIDE, SODIUM LACTATE AND CALCIUM CHLORIDE: 600; 310; 30; 20 INJECTION, SOLUTION INTRAVENOUS at 07:40

## 2021-01-11 ASSESSMENT — PULMONARY FUNCTION TESTS
PIF_VALUE: 9
PIF_VALUE: 3
PIF_VALUE: 1
PIF_VALUE: 10
PIF_VALUE: 4
PIF_VALUE: 0
PIF_VALUE: 8
PIF_VALUE: 1
PIF_VALUE: 6
PIF_VALUE: 2
PIF_VALUE: 9

## 2021-01-11 ASSESSMENT — PAIN DESCRIPTION - ORIENTATION: ORIENTATION: LOWER

## 2021-01-11 ASSESSMENT — PAIN SCALES - GENERAL
PAINLEVEL_OUTOF10: 0
PAINLEVEL_OUTOF10: 2
PAINLEVEL_OUTOF10: 0

## 2021-01-11 ASSESSMENT — ENCOUNTER SYMPTOMS: SHORTNESS OF BREATH: 0

## 2021-01-11 ASSESSMENT — PAIN DESCRIPTION - LOCATION: LOCATION: ABDOMEN

## 2021-01-11 NOTE — H&P
Chad Thomaselor is an 38 y.o.  female. Past Medical History:   Diagnosis Date    Hypertension     during pregnancy     Past Surgical History:   Procedure Laterality Date    ABDOMEN SURGERY       SECTION  ,,,,    HIATAL HERNIA REPAIR N/A 2020    LAPAROSCOPIC HIATAL HERNIA REPAIR WITH MESH, TRUNCAL VAGOTOMY performed by Daniel Land MD at [address] [place] [address]  2017    Laparascopic    UPPER GASTROINTESTINAL ENDOSCOPY  2016    Dr. Ghada Shirley ENDOSCOPY N/A 10/9/2020    EGD BIOPSY performed by Daniel Land MD at [address] [place] [address]       Prior to Admission medications    Medication Sig Start Date End Date Taking? Authorizing Provider   omeprazole (PRILOSEC) 20 MG delayed release capsule Take 1 capsule by mouth 2 times daily 10/9/20  Yes Daniel Land MD   sucralfate (CARAFATE) 1 GM tablet Take 0.5 tablets by mouth 4 times daily 10/9/20  Yes Daniel Land MD   lisinopril (PRINIVIL;ZESTRIL) 5 MG tablet 10 mg  20  Yes Historical Provider, MD   albuterol sulfate  (90 Base) MCG/ACT inhaler TAKE 1 PUFF INHALED 4 TIMES PER DAY TAKE AS NEEDED 20  Yes Historical Provider, MD   Diclofenac Sodium  MG TB24 TAKE 1 TABLET, 1 TIME PER DAY.  WITH FOOD 19  Yes Historical Provider, MD   LORATADINE-D 24HR  MG per extended release tablet TAKE ONE TABLET BY MOUTH EVERY DAY AS NEEDED 3/20/19  Yes Historical Provider, MD   fluticasone (FLONASE) 50 MCG/ACT nasal spray as needed 3/4/19  Yes Historical Provider, MD   Cholecalciferol (VITAMIN D3) 5000 units TABS Take by mouth daily   Yes Historical Provider, MD   Multiple Vitamins-Minerals (THERAPEUTIC MULTIVITAMIN-MINERALS) tablet Take 1 tablet by mouth 2 times daily Indications: bariatric advantage   Yes Historical Provider, MD   acetaminophen (APAP EXTRA STRENGTH) 500 MG tablet Take 1 tablet by mouth every 6 hours as needed for Pain 5/31/15  Yes Elodie Hashimoto, DO     Allergies: Allergies   Allergen Reactions    Ibuprofen Swelling     Family History   Problem Relation Age of Onset    Hearing Loss Paternal Cousin     Heart Disease Mother      Social History     Socioeconomic History    Marital status: Single     Spouse name: Not on file    Number of children: Not on file    Years of education: Not on file    Highest education level: Not on file   Occupational History    Not on file   Social Needs    Financial resource strain: Not on file    Food insecurity     Worry: Not on file     Inability: Not on file    Transportation needs     Medical: Not on file     Non-medical: Not on file   Tobacco Use    Smoking status: Former Smoker     Types: Cigarettes    Smokeless tobacco: Never Used   Substance and Sexual Activity    Alcohol use: No    Drug use: No    Sexual activity: Yes     Partners: Male   Lifestyle    Physical activity     Days per week: Not on file     Minutes per session: Not on file    Stress: Not on file   Relationships    Social connections     Talks on phone: Not on file     Gets together: Not on file     Attends Restoration service: Not on file     Active member of club or organization: Not on file     Attends meetings of clubs or organizations: Not on file     Relationship status: Not on file    Intimate partner violence     Fear of current or ex partner: Not on file     Emotionally abused: Not on file     Physically abused: Not on file     Forced sexual activity: Not on file   Other Topics Concern    Not on file   Social History Narrative    Not on file     Active Problems:    * No active hospital problems. *  Resolved Problems:    * No resolved hospital problems. *    Height 5' 4\" (1.626 m), weight 220 lb (99.8 kg), not currently breastfeeding. Review of Systems    Physical Exam  Constitutional:       Appearance: Normal appearance. She is obese. HENT:      Head: Normocephalic and atraumatic. Neck:      Musculoskeletal: Normal range of motion and neck supple. Cardiovascular:      Rate and Rhythm: Normal rate and regular rhythm. Heart sounds: Normal heart sounds. Pulmonary:      Effort: Pulmonary effort is normal.      Breath sounds: Normal breath sounds. Abdominal:      Palpations: Abdomen is soft. There is no mass. Tenderness: There is no abdominal tenderness. There is no guarding. Musculoskeletal: Normal range of motion. Lymphadenopathy:      Cervical: No cervical adenopathy. Neurological:      Mental Status: She is alert and oriented to person, place, and time. Psychiatric:         Mood and Affect: Mood normal.         Behavior: Behavior normal.         Thought Content:  Thought content normal.         Judgment: Judgment normal.         Assessment:  Displacement of intrauterine contraceptive device    Plan:  Hysteroscopy, Removal of IUD scheduled    Sanjuanita Hall MD  1/11/2021

## 2021-01-11 NOTE — PROGRESS NOTES
Ambulated in the hallway, steady gait. Voided. Small amount bloody drainage on aamir pad. Requesting discharge, met criteria. Instructions reviewed.

## 2021-01-11 NOTE — PROGRESS NOTES
Phone call to Dr. Aye Sun re: discharge order and prescriptions. He states he will call her pharmacy with prescription(s).

## 2021-01-11 NOTE — OP NOTE
Operative Note      Patient: Chelsi Sheikh  YOB: 1980  MRN: [id number]    Date of Procedure: 1/11/2021    Pre-Op Diagnosis: RETAINED IUD    Post-Op Diagnosis: Same       Surgeon(s):  Harrietta Claude, MD    Procedure: Hysteroscopy, removal of IUD    Anesthesia: General    Estimated Blood Loss (mL): None    Complications: None    Specimens:  None    Findings: IUD detected in the endometrial cavity with IUD threads coiled deep in the cervical canal.    Detailed Description of Procedure: The patient was brought to the operating room where general anesthesia was induced. She was then placed in lithotomy position. The abdomen, perineum, and adjacent areas of the thighs as well as the vagina were scrubbed. The patient was then draped according to routine sterile precautions. The bladder was catheterized. Examination under anesthetic revealed a normal size uterus with no adnexal masses. A Sim's speculum was then inserted. The cervix was identified and grasped with an Allis clamp. The cervix was then gradually dilated up to 7-mm dilatation to accommodate the hysteroscope. The hysteroscope was then inserted with saline running. Hysteroscopic examination of the uterine cavity revealed a normal-looking shape of the cavity. The IUD was detected in the endometrial cavity with the threads coiled deep in the endocervical canal. The endometrium in general looked normal. Both tubal ostia were identified. There were no submucous fibroids. There were no areas of the endometrium that looked friable or suspicious for malignancy. After thorough examination of the endometrial cavity, a grasper was inserted through the operative channel of the hysteroscope. The threads of the IUD were grasped and the IUD was pulled out. After checking for complete hemostasis, all instruments were removed from the vagina. The cervix was observed for any bleeding from the tenaculum site. The patient was then taken out of lithotomy position. Anesthesia was reversed. She was then transferred to the recovery room in a stable condition. There was no blood loss during the procedure. The procedure was well tolerated by the patient. She will be discharged home when stable.          Electronically signed by Marion Roy MD on 1/11/2021 at 8:09 AM

## 2021-01-11 NOTE — ANESTHESIA POSTPROCEDURE EVALUATION
Department of Anesthesiology  Postprocedure Note    Patient: Cortes Islas  MRN: [id number]  YOB: 1980  Date of evaluation: 1/11/2021  Time:  12:03 PM     Procedure Summary     Date: 01/11/21 Room / Location: [address] [place] [address] 03 / [facility]    Anesthesia Start: 0740 Anesthesia Stop: 8931    Procedure: IUD REMOVAL (Jacki Led) (CPT 92318) (N/A Vagina ) Diagnosis: (RETAINED IUD)    Surgeons: Stanford Lawrence MD Responsible Provider: Jessica Pollock DO    Anesthesia Type: general ASA Status: 2          Anesthesia Type: general    Cori Phase I: Cori Score: 10    Cori Phase II: Cori Score: 10    Last vitals: Reviewed and per EMR flowsheets.        Anesthesia Post Evaluation    Patient location during evaluation: PACU  Patient participation: complete - patient participated  Level of consciousness: awake  Pain score: 0  Airway patency: patent  Nausea & Vomiting: no nausea  Complications: no  Cardiovascular status: blood pressure returned to baseline  Respiratory status: acceptable  Hydration status: euvolemic

## 2021-01-11 NOTE — ANESTHESIA PRE PROCEDURE
Department of Anesthesiology  Preprocedure Note       Name:  Cortes Islas   Age:  38 y.o.  :  1980                                          MRN:  [id number]         Date:  2021      Surgeon: Radha Hussein):  Stanford Lawrence MD    Procedure: Procedure(s):  IUD REMOVAL ([address]) (CPT [address]14)    Medications prior to admission:   Prior to Admission medications    Medication Sig Start Date End Date Taking? Authorizing Provider   omeprazole (PRILOSEC) 20 MG delayed release capsule Take 1 capsule by mouth 2 times daily 10/9/20  Yes Michael Lott MD   sucralfate (CARAFATE) 1 GM tablet Take 0.5 tablets by mouth 4 times daily 10/9/20  Yes Michael Lott MD   lisinopril (PRINIVIL;ZESTRIL) 5 MG tablet 10 mg  20  Yes Historical Provider, MD   albuterol sulfate  (90 Base) MCG/ACT inhaler TAKE 1 PUFF INHALED 4 TIMES PER DAY TAKE AS NEEDED 20  Yes Historical Provider, MD   Diclofenac Sodium  MG TB24 TAKE 1 TABLET, 1 TIME PER DAY.  WITH FOOD 19  Yes Historical Provider, MD   LORATADINE-D 24HR  MG per extended release tablet TAKE ONE TABLET BY MOUTH EVERY DAY AS NEEDED 3/20/19  Yes Historical Provider, MD   fluticasone (FLONASE) 50 MCG/ACT nasal spray as needed 3/4/19  Yes Historical Provider, MD   Cholecalciferol (VITAMIN D3) 5000 units TABS Take by mouth daily   Yes Historical Provider, MD   Multiple Vitamins-Minerals (THERAPEUTIC MULTIVITAMIN-MINERALS) tablet Take 1 tablet by mouth 2 times daily Indications: bariatric advantage   Yes Historical Provider, MD   acetaminophen (APAP EXTRA STRENGTH) 500 MG tablet Take 1 tablet by mouth every 6 hours as needed for Pain 5/31/15  Yes Natalia Coleman DO       Current medications:    Current Facility-Administered Medications   Medication Dose Route Frequency Provider Last Rate Last Admin    lactated ringers infusion   Intravenous Continuous Nigel Méndez MD  lactated ringers infusion   Intravenous Continuous Rogelio Greco  mL/hr at 21 0716 New Bag at 21 0716    sodium chloride flush 0.9 % injection 10 mL  10 mL Intravenous 2 times per day Rogelio Greco MD        sodium chloride flush 0.9 % injection 10 mL  10 mL Intravenous PRN Rogelio Greco MD        ondansetron (ZOFRAN) injection 4 mg  4 mg Intravenous Q6H PRN Rogelio Greco MD           Allergies: Allergies   Allergen Reactions    Ibuprofen Swelling       Problem List:    Patient Active Problem List   Diagnosis Code    S/P gastric bypass Z98.84    Gastroesophageal reflux disease without esophagitis K21.9    Marginal ulcer K28.9    Hiatal hernia K44.9       Past Medical History:        Diagnosis Date    Hypertension     during pregnancy       Past Surgical History:        Procedure Laterality Date    ABDOMEN SURGERY       SECTION  ,,,,    HIATAL HERNIA REPAIR N/A 2020    LAPAROSCOPIC HIATAL HERNIA REPAIR WITH MESH, TRUNCAL VAGOTOMY performed by Tegan Ulloa MD at [address] [place] [address]  2017    Laparascopic    UPPER GASTROINTESTINAL ENDOSCOPY  2016    Dr. Sia Disla UPPER GASTROINTESTINAL ENDOSCOPY N/A 10/9/2020    EGD BIOPSY performed by Tegan Ulloa MD at [address] EXTRACTION         Social History:    Social History     Tobacco Use    Smoking status: Former Smoker     Types: Cigarettes    Smokeless tobacco: Never Used   Substance Use Topics    Alcohol use:  No                                Counseling given: Not Answered      Vital Signs (Current):   Vitals:    21 1242 21 0711   BP:  124/75   Pulse:  78   Resp:  16   Temp:  35.8 °C (96.4 °F)   TempSrc:  Infrared   SpO2:  98%   Weight: 220 lb (99.8 kg) 239 lb (108.4 kg)   Height: 5' 4\" (1.626 m)                                               BP Readings from Last 3 Encounters:   21 124/75   20 (!) 131/58   20 (!) 145/89 NPO Status: Time of last liquid consumption: 0000                        Time of last solid consumption: 2358                        Date of last liquid consumption: 01/11/21                        Date of last solid food consumption: 01/10/21    BMI:   Wt Readings from Last 3 Encounters:   01/11/21 239 lb (108.4 kg)   12/02/20 229 lb 8 oz (104.1 kg)   11/09/20 240 lb 5 oz (109 kg)     Body mass index is 41.02 kg/m². CBC:   Lab Results   Component Value Date    WBC 4.5 01/08/2021    RBC 4.54 01/08/2021    HGB 13.2 01/08/2021    HCT 38.8 01/08/2021    MCV 85.5 01/08/2021    RDW 12.6 01/08/2021     01/08/2021       CMP:   Lab Results   Component Value Date     01/08/2021    K 4.0 01/08/2021     01/08/2021    CO2 23 01/08/2021    BUN 8 01/08/2021    CREATININE 0.7 01/08/2021    GFRAA >60 01/08/2021    LABGLOM >60 01/08/2021    GLUCOSE 79 01/08/2021    PROT 7.0 09/21/2020    CALCIUM 8.6 01/08/2021    BILITOT 0.3 09/21/2020    ALKPHOS 126 09/21/2020    AST 22 09/21/2020    ALT 41 09/21/2020       POC Tests: No results for input(s): POCGLU, POCNA, POCK, POCCL, POCBUN, POCHEMO, POCHCT in the last 72 hours.     Coags: No results found for: PROTIME, INR, APTT    HCG (If Applicable):   Lab Results   Component Value Date    PREGTESTUR NEGATIVE 11/16/2020        ABGs: No results found for: PHART, PO2ART, NSE9HCE, YHH6GCC, BEART, J8MOUROK     Type & Screen (If Applicable):  No results found for: LABABO, LABRH    Drug/Infectious Status (If Applicable):  No results found for: HIV, HEPCAB    COVID-19 Screening (If Applicable):   Lab Results   Component Value Date    COVID19 Not Detected 01/06/2021    COVID19 Not Detected 11/10/2020     ECG 11/9/2020  Narrative & Impression    Normal sinus rhythm  Low voltage QRS  EKG WITHIN NORMAL LIMITS  No previous ECGs available  Reconfirmed by Danilo Ashford ([id number]) on 11/10/2020 1:58:10 PM     ECHO 3/10/2017  Findings      Left Ventricle

## 2021-01-27 ENCOUNTER — OFFICE VISIT (OUTPATIENT)
Dept: ORTHOPEDIC SURGERY | Age: 41
End: 2021-01-27
Payer: COMMERCIAL

## 2021-01-27 VITALS — TEMPERATURE: 98 F | HEIGHT: 64 IN | BODY MASS INDEX: 38.41 KG/M2 | WEIGHT: 225 LBS

## 2021-01-27 DIAGNOSIS — G56.03 BILATERAL CARPAL TUNNEL SYNDROME: Primary | ICD-10-CM

## 2021-01-27 PROCEDURE — 99214 OFFICE O/P EST MOD 30 MIN: CPT | Performed by: ORTHOPAEDIC SURGERY

## 2021-01-27 PROCEDURE — G8484 FLU IMMUNIZE NO ADMIN: HCPCS | Performed by: ORTHOPAEDIC SURGERY

## 2021-01-27 PROCEDURE — G8427 DOCREV CUR MEDS BY ELIG CLIN: HCPCS | Performed by: ORTHOPAEDIC SURGERY

## 2021-01-27 PROCEDURE — 1036F TOBACCO NON-USER: CPT | Performed by: ORTHOPAEDIC SURGERY

## 2021-01-27 PROCEDURE — G8417 CALC BMI ABV UP PARAM F/U: HCPCS | Performed by: ORTHOPAEDIC SURGERY

## 2021-01-27 RX ORDER — BUSPIRONE HYDROCHLORIDE 10 MG/1
TABLET ORAL
COMMUNITY
Start: 2021-01-24 | End: 2022-09-30

## 2021-01-27 RX ORDER — LISINOPRIL 10 MG/1
TABLET ORAL
COMMUNITY
Start: 2020-12-22

## 2021-01-27 RX ORDER — ERGOCALCIFEROL 1.25 MG/1
CAPSULE ORAL
COMMUNITY
Start: 2020-12-05

## 2021-01-27 RX ORDER — LORATADINE 10 MG/1
TABLET ORAL
COMMUNITY
Start: 2020-12-28

## 2021-01-27 RX ORDER — CLOTRIMAZOLE AND BETAMETHASONE DIPROPIONATE 10; .64 MG/G; MG/G
CREAM TOPICAL
COMMUNITY
Start: 2020-12-29

## 2021-01-27 NOTE — PROGRESS NOTES
carpal tunnel. The surgery, risk, prognosis, limitations and rehab issues were discussed with her in detail with parent good understanding and at her request we will schedule treatment of the right hand which is clinically worse. The patient was counseled at length about the risks of ankita Covid-19 during their perioperative period and any recovery window from their procedure. The patient was made aware that ankita Covid-19  may worsen their prognosis for recovering from their procedure  and lend to a higher morbidity and/or mortality risk. All material risks, benefits, and reasonable alternatives including postponing the procedure were discussed. The patient does wish to proceed with the procedure at this time. No follow-ups on file. Current Outpatient Medications   Medication Sig Dispense Refill    busPIRone (BUSPAR) 10 MG tablet       vitamin D (ERGOCALCIFEROL) 1.25 MG (55286 UT) CAPS capsule TAKE 1 CAPLET ORALLY TWICE PER WEEK FOR 28 DAYS      lisinopril (PRINIVIL;ZESTRIL) 10 MG tablet TAKE 1 TABLET BY MOUTH EVERY DAY      loratadine (CLARITIN) 10 MG tablet TAKE 1 TABLET BY MOUTH EVERY DAY AS NEEDED      clotrimazole-betamethasone (LOTRISONE) 1-0.05 % cream APPLY TOPICALLY THREE TIMES DAILY      omeprazole (PRILOSEC) 20 MG delayed release capsule Take 1 capsule by mouth 2 times daily 30 capsule 3    sucralfate (CARAFATE) 1 GM tablet Take 0.5 tablets by mouth 4 times daily 120 tablet 3    lisinopril (PRINIVIL;ZESTRIL) 5 MG tablet 10 mg       albuterol sulfate  (90 Base) MCG/ACT inhaler TAKE 1 PUFF INHALED 4 TIMES PER DAY TAKE AS NEEDED      Diclofenac Sodium  MG TB24 TAKE 1 TABLET, 1 TIME PER DAY.  WITH FOOD  2    LORATADINE-D 24HR  MG per extended release tablet TAKE ONE TABLET BY MOUTH EVERY DAY AS NEEDED  0    fluticasone (FLONASE) 50 MCG/ACT nasal spray as needed  6    Cholecalciferol (VITAMIN D3) 5000 units TABS Take by mouth daily      Multiple Vitamins-Minerals (THERAPEUTIC MULTIVITAMIN-MINERALS) tablet Take 1 tablet by mouth 2 times daily Indications: bariatric advantage      acetaminophen (APAP EXTRA STRENGTH) 500 MG tablet Take 1 tablet by mouth every 6 hours as needed for Pain 40 tablet 0     No current facility-administered medications for this visit.         Patient Active Problem List   Diagnosis    S/P gastric bypass    Gastroesophageal reflux disease without esophagitis    Marginal ulcer    Hiatal hernia       Past Medical History:   Diagnosis Date    Hypertension     during pregnancy       Past Surgical History:   Procedure Laterality Date    ABDOMEN SURGERY       SECTION  ,,,,    DILATION AND CURETTAGE OF UTERUS N/A 2021    IUD REMOVAL ([address]) (CPT 01970) performed by Kacie Fraire MD at [facility] N/A 2020    LAPAROSCOPIC HIATAL HERNIA REPAIR WITH MESH, TRUNCAL VAGOTOMY performed by Nehemias Valadez MD at [address] [place] [address]  2017    Laparascopic    UPPER GASTROINTESTINAL ENDOSCOPY  2016    Dr. Rc Pisano UPPER GASTROINTESTINAL ENDOSCOPY N/A 10/9/2020    EGD BIOPSY performed by Nehemias Valadez MD at [address] [place] [address] EXTRACTION         Allergies   Allergen Reactions    Ibuprofen Swelling       Social History     Socioeconomic History    Marital status: Single     Spouse name: None    Number of children: None    Years of education: None    Highest education level: None   Occupational History    None   Social Needs    Financial resource strain: None    Food insecurity     Worry: None     Inability: None    Transportation needs     Medical: None     Non-medical: None   Tobacco Use    Smoking status: Former Smoker     Types: Cigarettes    Smokeless tobacco: Never Used   Substance and Sexual Activity    Alcohol use: No    Drug use: No    Sexual activity: Yes     Partners: Male   Lifestyle    Physical activity     Days per week: None     Minutes per session: None    Stress: None   Relationships    Social connections     Talks on phone: None     Gets together: None     Attends [employer] service: None     Active member of club or organization: None     Attends meetings of clubs or organizations: None     Relationship status: None    Intimate partner violence     Fear of current or ex partner: None     Emotionally abused: None     Physically abused: None     Forced sexual activity: None   Other Topics Concern    None   Social History Narrative    None       Review of Systems  As follows except as previously noted in HPI:  Constitutional: Negative for chills, diaphoresis, fatigue, fever and unexpected weight change. Respiratory: Negative for cough, shortness of breath and wheezing. Cardiovascular: Negative for chest pain and palpitations. Neurological: Negative for dizziness, syncope, cephalgia. GI / : negative  Musculoskeletal: see HPI       Objective:   Physical Exam   Constitutional: Oriented to person, place, and time. and appears well-developed and well-nourished. :   Head: Normocephalic and atraumatic. Eyes: EOM are normal.   Neck: Neck supple. Cardiovascular: Normal rate and regular rhythm. Pulmonary/Chest: Effort normal. No stridor. No respiratory distress, no wheezes. Abdominal:  No abnormal distension. Neurological: Alert and oriented to person, place, and time. Skin: Skin is warm and dry. Psychiatric: Normal mood and affect.  Behavior is normal. Thought content normal.    CELSETINO Dueñas DO    1/27/21  11:36 AM

## 2021-03-09 ENCOUNTER — HOSPITAL ENCOUNTER (EMERGENCY)
Age: 41
Discharge: HOME | End: 2021-03-09
Attending: FAMILY MEDICINE
Payer: COMMERCIAL

## 2021-03-09 VITALS
DIASTOLIC BLOOD PRESSURE: 82 MMHG | WEIGHT: 230 LBS | HEART RATE: 90 BPM | OXYGEN SATURATION: 100 % | BODY MASS INDEX: 39.48 KG/M2 | TEMPERATURE: 97.3 F | RESPIRATION RATE: 16 BRPM | SYSTOLIC BLOOD PRESSURE: 138 MMHG

## 2021-03-09 DIAGNOSIS — M25.561 ACUTE PAIN OF BOTH KNEES: Primary | ICD-10-CM

## 2021-03-09 DIAGNOSIS — M25.562 ACUTE PAIN OF BOTH KNEES: Primary | ICD-10-CM

## 2021-03-09 PROCEDURE — G0381 LEV 2 HOSP TYPE B ED VISIT: HCPCS

## 2021-03-09 PROCEDURE — 6360000002 HC RX W HCPCS: Performed by: FAMILY MEDICINE

## 2021-03-09 PROCEDURE — 96372 THER/PROPH/DIAG INJ SC/IM: CPT

## 2021-03-09 RX ORDER — PREDNISONE 10 MG/1
40 TABLET ORAL DAILY
Qty: 16 TABLET | Refills: 0 | Status: SHIPPED | OUTPATIENT
Start: 2021-03-09 | End: 2021-03-13

## 2021-03-09 RX ORDER — DEXAMETHASONE SODIUM PHOSPHATE 10 MG/ML
10 INJECTION, SOLUTION INTRAMUSCULAR; INTRAVENOUS ONCE
Status: COMPLETED | OUTPATIENT
Start: 2021-03-09 | End: 2021-03-09

## 2021-03-09 RX ADMIN — DEXAMETHASONE SODIUM PHOSPHATE 10 MG: 10 INJECTION, SOLUTION INTRAMUSCULAR; INTRAVENOUS at 20:12

## 2021-03-09 ASSESSMENT — PAIN DESCRIPTION - FREQUENCY: FREQUENCY: CONTINUOUS

## 2021-03-09 ASSESSMENT — PAIN DESCRIPTION - LOCATION: LOCATION: KNEE

## 2021-03-09 ASSESSMENT — PAIN DESCRIPTION - PROGRESSION: CLINICAL_PROGRESSION: NOT CHANGED

## 2021-03-09 ASSESSMENT — PAIN DESCRIPTION - ORIENTATION: ORIENTATION: RIGHT;LEFT

## 2021-03-11 NOTE — ED PROVIDER NOTES
---------------------------   The nursing notes within the ED encounter and vital signs as below have been reviewed. /82   Pulse 90   Temp 97.3 °F (36.3 °C) (Temporal)   Resp 16   Wt 230 lb (104.3 kg)   SpO2 100%   BMI 39.48 kg/m²   Oxygen Saturation Interpretation: Normal      ---------------------------------------------------PHYSICAL EXAM--------------------------------------    Constitutional/General: Alert and oriented x3, well appearing, non toxic in NAD  Head: NC/AT  Eyes: PERRL, EOMI  Mouth: Oropharynx clear, handling secretions, no trismus  Neck: Supple, full ROM, no meningeal signs  Pulmonary: Lungs clear to auscultation bilaterally, no wheezes, rales, or rhonchi. Not in respiratory distress  Cardiovascular:  Regular rate and rhythm, no murmurs, gallops, or rubs. 2+ distal pulses  Abdomen: Soft, non tender, non distended,   Extremities: Moves all extremities x 4. Warm and well perfused  Knees:  No deformity or erythema or warmth was noted. Skin: warm and dry without rash  Neurologic: GCS 15,  Psych: Normal Affect      ------------------------------ ED COURSE/MEDICAL DECISION MAKING----------------------  Medications   dexamethasone (PF) (DECADRON) injection 10 mg (10 mg Intramuscular Given 3/9/21 2012)         Medical Decision Making:    Simple    Counseling: The emergency provider has spoken with the patient and discussed todays results, in addition to providing specific details for the plan of care and counseling regarding the diagnosis and prognosis. Questions are answered at this time and they are agreeable with the plan.      --------------------------------- IMPRESSION AND DISPOSITION ---------------------------------    IMPRESSION  1.  Acute pain of both knees        DISPOSITION  Disposition: Discharge to home  Patient condition is stable                 Sandra Valencia MD  03/11/21 [address]

## 2021-05-26 ENCOUNTER — TELEPHONE (OUTPATIENT)
Dept: BARIATRICS/WEIGHT MGMT | Age: 41
End: 2021-05-26

## 2021-05-26 ENCOUNTER — OFFICE VISIT (OUTPATIENT)
Dept: BARIATRICS/WEIGHT MGMT | Age: 41
End: 2021-05-26
Payer: COMMERCIAL

## 2021-05-26 VITALS
WEIGHT: 238 LBS | DIASTOLIC BLOOD PRESSURE: 91 MMHG | TEMPERATURE: 97.3 F | HEIGHT: 64 IN | BODY MASS INDEX: 40.63 KG/M2 | HEART RATE: 77 BPM | RESPIRATION RATE: 20 BRPM | SYSTOLIC BLOOD PRESSURE: 158 MMHG

## 2021-05-26 DIAGNOSIS — Z98.84 WEIGHT GAIN FOLLOWING GASTRIC BYPASS SURGERY: Primary | ICD-10-CM

## 2021-05-26 DIAGNOSIS — R63.5 WEIGHT GAIN FOLLOWING GASTRIC BYPASS SURGERY: Primary | ICD-10-CM

## 2021-05-26 DIAGNOSIS — R63.5 WEIGHT GAIN: Primary | ICD-10-CM

## 2021-05-26 PROCEDURE — G8417 CALC BMI ABV UP PARAM F/U: HCPCS | Performed by: SURGERY

## 2021-05-26 PROCEDURE — 1036F TOBACCO NON-USER: CPT | Performed by: SURGERY

## 2021-05-26 PROCEDURE — 99211 OFF/OP EST MAY X REQ PHY/QHP: CPT

## 2021-05-26 PROCEDURE — G8427 DOCREV CUR MEDS BY ELIG CLIN: HCPCS | Performed by: SURGERY

## 2021-05-26 PROCEDURE — 99213 OFFICE O/P EST LOW 20 MIN: CPT | Performed by: SURGERY

## 2021-05-26 NOTE — TELEPHONE ENCOUNTER
I called Gustavo Villegas is an exclusion under plan it is not covered. Elizabeth called [facility] to see how much Contrave was with a good rx coupon. It was still 300$. I told Dr. Janessa Lawrence and I use [facility] its 100 for 1 month. Dr Janessa Gray agreed with the prescription on 8-90 mg tab 2 tab twice daily quantity 120 with 5 refills. I will call pt and let her know that they mail order Racquel Leader will get a hold of her. Old order discontinued and new order is put in.

## 2021-05-26 NOTE — PROGRESS NOTES
Surgery Progress Note            Chief complaint:   Chief Complaint   Patient presents with    Other     Weight gain      Patient Active Problem List   Diagnosis    S/P gastric bypass    Gastroesophageal reflux disease without esophagitis    Marginal ulcer    Hiatal hernia       S: having issues with weight gain    O:   Vitals:    05/26/21 1356   BP: (!) 158/91   Pulse: 77   Resp: 20   Temp: 97.3 °F (36.3 °C)     No intake or output data in the 24 hours ending 05/26/21 1402        Labs:  Lab Results   Component Value Date    WBC 4.5 01/08/2021    WBC 5.0 11/09/2020    WBC 4.3 09/21/2020    HGB 13.2 01/08/2021    HGB 12.6 11/09/2020    HGB 13.4 09/21/2020    HCT 38.8 01/08/2021    HCT 38.2 11/09/2020    HCT 40.3 09/21/2020     Lab Results   Component Value Date    CREATININE 0.7 01/08/2021    BUN 8 01/08/2021     01/08/2021    K 4.0 01/08/2021     01/08/2021    CO2 23 01/08/2021     No results found for: LIPASE, AMYLASE      Physical exam:   BP (!) 158/91 (Site: Right Lower Arm, Position: Sitting, Cuff Size: Large Adult)   Pulse 77   Temp 97.3 °F (36.3 °C) (Temporal)   Resp 20   Ht 5' 4\" (1.626 m)   Wt 238 lb (108 kg)   BMI 40.85 kg/m²   General appearance: NAD  Head: NCAT  Neck: supple, no masses  Lungs: equal chest rise bilateral  Heart: S1S2 present  Abdomen: soft, nontender, nondistended  Skin; no lesions  Gu: no cva tenderness  Extremities: extremities normal, atraumatic, no cyanosis or edema    A:  Weight regain s/p bariatric surgery    P: will discuss pouch reset with dietician and I will prescribe Valencia Lancaster MD, MD  5/26/2021

## 2021-05-26 NOTE — PATIENT INSTRUCTIONS
Please continue to take your vitamin and mineral supplements as instructed. If you received a blood work prescription today for laboratory monitoring due prior to your next routine follow-up visit, please have this blood work obtained 10 to 14 days prior to your next visit. It is important to fast for 12 hours prior to routine weight loss surgery blood work, EXCEPT for drinking water, to ensure accuracy of results. Please report nausea, vomiting, abdominal pain, or any other problems you experience to your surgeon. For problems related to weight loss surgery, it is best to go to [address] [place] [address] Emergency Department and have your surgeon paged.

## 2021-06-14 ENCOUNTER — HOSPITAL ENCOUNTER (EMERGENCY)
Age: 41
Discharge: HOME | End: 2021-06-14
Attending: EMERGENCY MEDICINE
Payer: COMMERCIAL

## 2021-06-14 VITALS
OXYGEN SATURATION: 96 % | HEART RATE: 91 BPM | TEMPERATURE: 97.1 F | SYSTOLIC BLOOD PRESSURE: 139 MMHG | BODY MASS INDEX: 37.56 KG/M2 | RESPIRATION RATE: 16 BRPM | WEIGHT: 220 LBS | DIASTOLIC BLOOD PRESSURE: 86 MMHG | HEIGHT: 64 IN

## 2021-06-14 DIAGNOSIS — M17.0 OSTEOARTHRITIS OF BOTH KNEES, UNSPECIFIED OSTEOARTHRITIS TYPE: Primary | ICD-10-CM

## 2021-06-14 PROCEDURE — 99282 EMERGENCY DEPT VISIT SF MDM: CPT

## 2021-06-14 PROCEDURE — 96372 THER/PROPH/DIAG INJ SC/IM: CPT

## 2021-06-14 PROCEDURE — 6360000002 HC RX W HCPCS: Performed by: EMERGENCY MEDICINE

## 2021-06-14 RX ORDER — DEXAMETHASONE SODIUM PHOSPHATE 10 MG/ML
10 INJECTION, SOLUTION INTRAMUSCULAR; INTRAVENOUS ONCE
Status: COMPLETED | OUTPATIENT
Start: 2021-06-14 | End: 2021-06-14

## 2021-06-14 RX ADMIN — DEXAMETHASONE SODIUM PHOSPHATE 10 MG: 10 INJECTION, SOLUTION INTRAMUSCULAR; INTRAVENOUS at 20:11

## 2021-06-14 ASSESSMENT — PAIN DESCRIPTION - ORIENTATION: ORIENTATION: RIGHT;LEFT

## 2021-06-14 ASSESSMENT — PAIN DESCRIPTION - PAIN TYPE: TYPE: ACUTE PAIN

## 2021-06-14 ASSESSMENT — PAIN DESCRIPTION - PROGRESSION
CLINICAL_PROGRESSION: NOT CHANGED
CLINICAL_PROGRESSION: NOT CHANGED

## 2021-06-14 ASSESSMENT — PAIN DESCRIPTION - ONSET: ONSET: ON-GOING

## 2021-06-14 ASSESSMENT — PAIN DESCRIPTION - DESCRIPTORS: DESCRIPTORS: CONSTANT;SQUEEZING

## 2021-06-14 ASSESSMENT — PAIN DESCRIPTION - LOCATION: LOCATION: FOOT

## 2021-06-14 ASSESSMENT — PAIN SCALES - GENERAL: PAINLEVEL_OUTOF10: 10

## 2021-06-14 ASSESSMENT — PAIN DESCRIPTION - FREQUENCY: FREQUENCY: INTERMITTENT

## 2021-06-15 NOTE — ED PROVIDER NOTES
HPI:  21,   Time: 8:11 PM EDT         Joe Talbert is a 38 y.o. female presenting to the ED for chronic bilateral knee pain that is flared up. Patient requesting Decadron injection. She was last here approximately 3 months ago. He is supposed to follow-up with Dr. Radha Lopez but has failed to do so. ROS:   Pertinent positives and negatives are stated within HPI, all other systems reviewed and are negative.  --------------------------------------------- PAST HISTORY ---------------------------------------------  Past Medical History:  has a past medical history of Arthritis and Hypertension. Past Surgical History:  has a past surgical history that includes Abdomen surgery; [place] tooth extraction;  section (,,,,); Upper gastrointestinal endoscopy (2016); [place] Gastric Bypass (2017); Upper gastrointestinal endoscopy (N/A, 10/9/2020); hiatal hernia repair (N/A, 2020); and Dilation and curettage of uterus (N/A, 2021). Social History:  reports that she has quit smoking. Her smoking use included cigarettes. She has never used smokeless tobacco. She reports that she does not drink alcohol and does not use drugs. Family History: family history includes Hearing Loss in her paternal cousin; Heart Disease in her mother. The patients home medications have been reviewed. Allergies: Ibuprofen    -------------------------------------------------- RESULTS -------------------------------------------------  All laboratory and radiology results have been personally reviewed by myself   LABS:  No results found for this visit on 21. RADIOLOGY:  Interpreted by Radiologist.  No orders to display       ------------------------- NURSING NOTES AND VITALS REVIEWED ---------------------------   The nursing notes within the ED encounter and vital signs as below have been reviewed.    /86   Pulse 91   Temp 97.1 °F (36.2 °C) (Infrared)   Resp tablet by mouth 2 times daily Indications: bariatric advantage    NALTREXONE-BUPROPION (CONTRAVE) 8-90 MG PER EXTENDED RELEASE TABLET    Take 2 tablets by mouth 2 times daily    NALTREXONE-BUPROPION (CONTRAVE) 8-90 MG PER EXTENDED RELEASE TABLET    Take 2 tablets by mouth 2 times daily    VITAMIN D (ERGOCALCIFEROL) 1.25 MG (67354 UT) CAPS CAPSULE    TAKE 1 CAPLET ORALLY TWICE PER WEEK FOR 28 DAYS   Modified Medications    No medications on file   Discontinued Medications    No medications on file         Counseling: The emergency provider has spoken with the patient and discussed todays results, in addition to providing specific details for the plan of care and counseling regarding the diagnosis and prognosis. Questions are answered at this time and they are agreeable with the plan.      --------------------------------- IMPRESSION AND DISPOSITION ---------------------------------    IMPRESSION  1.  Osteoarthritis of both knees, unspecified osteoarthritis type        DISPOSITION  Disposition: Discharge to home  Patient condition is good                  Polo Steve MD  06/14/21 2012

## 2021-07-16 ENCOUNTER — OFFICE VISIT (OUTPATIENT)
Dept: ORTHOPEDIC SURGERY | Age: 41
End: 2021-07-16
Payer: COMMERCIAL

## 2021-07-16 VITALS — BODY MASS INDEX: 39.27 KG/M2 | HEIGHT: 64 IN | WEIGHT: 230 LBS | TEMPERATURE: 98 F

## 2021-07-16 DIAGNOSIS — M19.071 ARTHRITIS OF BOTH FEET: ICD-10-CM

## 2021-07-16 DIAGNOSIS — M25.562 PAIN IN BOTH KNEES, UNSPECIFIED CHRONICITY: Primary | ICD-10-CM

## 2021-07-16 DIAGNOSIS — M25.561 PAIN IN BOTH KNEES, UNSPECIFIED CHRONICITY: Primary | ICD-10-CM

## 2021-07-16 DIAGNOSIS — M17.0 ARTHRITIS OF BOTH KNEES: ICD-10-CM

## 2021-07-16 DIAGNOSIS — M19.072 ARTHRITIS OF BOTH FEET: ICD-10-CM

## 2021-07-16 PROCEDURE — G8417 CALC BMI ABV UP PARAM F/U: HCPCS | Performed by: ORTHOPAEDIC SURGERY

## 2021-07-16 PROCEDURE — 99213 OFFICE O/P EST LOW 20 MIN: CPT | Performed by: ORTHOPAEDIC SURGERY

## 2021-07-16 PROCEDURE — G8427 DOCREV CUR MEDS BY ELIG CLIN: HCPCS | Performed by: ORTHOPAEDIC SURGERY

## 2021-07-16 PROCEDURE — 1036F TOBACCO NON-USER: CPT | Performed by: ORTHOPAEDIC SURGERY

## 2021-07-16 NOTE — PROGRESS NOTES
Antonia Campo is a 38 y.o. female, who presents   Chief Complaint   Patient presents with    Knee Pain     f/u left knee pain that is now radiating down to her foot. HPI[de-identified] Bilateral knee and foot pains been present for quite some time. Patient was to have an appointment a few days ago but missed this by more than 40 minutes. She presents today and complains about her knees and feet much as she had problems with before. The symptoms are somewhat vague around the knees. She does not give specifics of location in her foot pain. Allergies; medications; past medical, surgical, family, and social history; and problem list have been reviewed today and updated as indicated in this encounter - see below following Ortho specifics. Musculoskeletal: Gait is fairly smooth and balanced without any obvious limp. Single-leg stance is possible on both sides without difficulty. Hip range of motion is good. Ankle range of motion is good with no instability or pain. There is mild tenderness to palpation over the dorsums of both feet. There prominences at the tarsometatarsal joints suggesting arthritic hypertrophic changes. There is no instability of the mid or hindfoot. Both knees have some crepitus with range of motion and some discomfort with mobilization of the patellae. She has only mild medial lateral laxity with no signs of ligamentous instability and cruciates or collaterals. There is no effusion this morning. The knees are not hot or red. There are no jorge signs of meniscal pathology. Radiologic Studies: Imaging from 2019 showed mild hypertrophic marginal changes medial compartment. There is only very slight narrowing medial compartment joint spaces. ASSESSMENT:  Karol Sandhu was seen today for knee pain.     Diagnoses and all orders for this visit:    Pain in both knees, unspecified chronicity  -     External Referral To Physical Therapy    Arthritis of both knees    Arthritis of both feet     Treatment alternatives were reviewed including medical and physical therapies, injections, and surgical options, expected risks benefits and likely outcome of each were discussed in detail, questions asked and answered and understood. We discussed symptoms as well as physical findings and previous imaging. PLAN: Recommendations for conservative care with continued attempts at weight loss. I also recommended physical therapy and she seemed finally willing to try this. We will schedule her for therapy particularly targeting her knees and follow-up in about 4 weeks.         Patient Active Problem List   Diagnosis    S/P gastric bypass    Gastroesophageal reflux disease without esophagitis    Marginal ulcer    Hiatal hernia       Past Medical History:   Diagnosis Date    Arthritis     Hypertension     during pregnancy       Past Surgical History:   Procedure Laterality Date    ABDOMEN SURGERY       SECTION  ,,,,    DILATION AND CURETTAGE OF UTERUS N/A 2021    IUD REMOVAL (ASHLY) (CPT 50911) performed by Meena Youssef MD at [address] [place] [address] N/A 2020    LAPAROSCOPIC HIATAL HERNIA REPAIR WITH MESH, TRUNCAL VAGOTOMY performed by Davion Knott MD at [address] [place] [address]  2017    Laparascopic    UPPER GASTROINTESTINAL ENDOSCOPY  2016    Dr. Allen Vega N/A 10/9/2020    EGD BIOPSY performed by Davion Knott MD at [address]         Current Outpatient Medications   Medication Sig Dispense Refill    naltrexone-buPROPion (CONTRAVE) 8-90 MG per extended release tablet Take 2 tablets by mouth 2 times daily 120 tablet 5    naltrexone-buPROPion (CONTRAVE) 8-90 MG per extended release tablet Take 2 tablets by mouth 2 times daily 120 tablet 5    busPIRone (BUSPAR) 10 MG tablet       vitamin D (ERGOCALCIFEROL) 1.25 MG (60221 UT) CAPS capsule TAKE 1 CAPLET ORALLY TWICE PER WEEK FOR 28 DAYS      lisinopril (PRINIVIL;ZESTRIL) 10 MG tablet TAKE 1 TABLET BY MOUTH EVERY DAY      loratadine (CLARITIN) 10 MG tablet TAKE 1 TABLET BY MOUTH EVERY DAY AS NEEDED      clotrimazole-betamethasone (LOTRISONE) 1-0.05 % cream APPLY TOPICALLY THREE TIMES DAILY      albuterol sulfate  (90 Base) MCG/ACT inhaler TAKE 1 PUFF INHALED 4 TIMES PER DAY TAKE AS NEEDED      Diclofenac Sodium  MG TB24 TAKE 1 TABLET, 1 TIME PER DAY. WITH FOOD  2    fluticasone (FLONASE) 50 MCG/ACT nasal spray as needed  6    Multiple Vitamins-Minerals (THERAPEUTIC MULTIVITAMIN-MINERALS) tablet Take 1 tablet by mouth 2 times daily Indications: bariatric advantage      acetaminophen (APAP EXTRA STRENGTH) 500 MG tablet Take 1 tablet by mouth every 6 hours as needed for Pain 40 tablet 0     No current facility-administered medications for this visit. Allergies   Allergen Reactions    Ibuprofen Swelling       Social History     Socioeconomic History    Marital status: Single     Spouse name: None    Number of children: None    Years of education: None    Highest education level: None   Occupational History    None   Tobacco Use    Smoking status: Former Smoker     Types: Cigarettes    Smokeless tobacco: Never Used   Vaping Use    Vaping Use: Never used   Substance and Sexual Activity    Alcohol use: No    Drug use: No    Sexual activity: Yes     Partners: Male   Other Topics Concern    None   Social History Narrative    None     Social Determinants of Health     Financial Resource Strain:     Difficulty of Paying Living Expenses:    Food Insecurity:     Worried About Running Out of Food in the Last Year:     Ran Out of Food in the Last Year:    Transportation Needs:     Lack of Transportation (Medical):      Lack of Transportation (Non-Medical):    Physical Activity:     Days of Exercise per Week:     Minutes of Exercise per Session:    Stress:     Feeling of Stress :

## 2021-08-30 DIAGNOSIS — K91.2 MALNUTRITION FOLLOWING GASTROINTESTINAL SURGERY: Primary | ICD-10-CM

## 2021-08-31 ENCOUNTER — HOSPITAL ENCOUNTER (OUTPATIENT)
Age: 41
Discharge: HOME | End: 2021-08-31
Payer: COMMERCIAL

## 2021-08-31 DIAGNOSIS — K91.2 MALNUTRITION FOLLOWING GASTROINTESTINAL SURGERY: ICD-10-CM

## 2021-08-31 LAB
ALBUMIN SERPL-MCNC: 3.9 G/DL (ref 3.5–5.2)
ALP BLD-CCNC: 120 U/L (ref 35–104)
ALT SERPL-CCNC: 31 U/L (ref 0–32)
ANION GAP SERPL CALCULATED.3IONS-SCNC: 8 MMOL/L (ref 7–16)
AST SERPL-CCNC: 25 U/L (ref 0–31)
BILIRUB SERPL-MCNC: 0.2 MG/DL (ref 0–1.2)
BUN BLDV-MCNC: 11 MG/DL (ref 6–20)
CALCIUM SERPL-MCNC: 8.6 MG/DL (ref 8.6–10.2)
CHLORIDE BLD-SCNC: 108 MMOL/L (ref 98–107)
CHOLESTEROL, TOTAL: 150 MG/DL (ref 0–199)
CO2: 24 MMOL/L (ref 22–29)
CREAT SERPL-MCNC: 0.7 MG/DL (ref 0.5–1)
FERRITIN: 27 NG/ML
FOLATE: 14.8 NG/ML (ref 4.8–24.2)
GFR AFRICAN AMERICAN: >60
GFR NON-AFRICAN AMERICAN: >60 ML/MIN/1.73
GLUCOSE BLD-MCNC: 98 MG/DL (ref 74–99)
HCT VFR BLD CALC: 35.5 % (ref 34–48)
HEMOGLOBIN: 12.4 G/DL (ref 11.5–15.5)
MCH RBC QN AUTO: 29.7 PG (ref 26–35)
MCHC RBC AUTO-ENTMCNC: 34.9 % (ref 32–34.5)
MCV RBC AUTO: 84.9 FL (ref 80–99.9)
PDW BLD-RTO: 12.7 FL (ref 11.5–15)
PLATELET # BLD: 378 E9/L (ref 130–450)
PMV BLD AUTO: 10.3 FL (ref 7–12)
POTASSIUM SERPL-SCNC: 4.3 MMOL/L (ref 3.5–5)
PREALBUMIN: 24 MG/DL (ref 20–40)
RBC # BLD: 4.18 E12/L (ref 3.5–5.5)
SODIUM BLD-SCNC: 140 MMOL/L (ref 132–146)
TOTAL PROTEIN: 7.1 G/DL (ref 6.4–8.3)
TRIGL SERPL-MCNC: 50 MG/DL (ref 0–149)
VITAMIN B-12: 571 PG/ML (ref 211–946)
WBC # BLD: 4.4 E9/L (ref 4.5–11.5)

## 2021-08-31 PROCEDURE — 85027 COMPLETE CBC AUTOMATED: CPT

## 2021-08-31 PROCEDURE — 84425 ASSAY OF VITAMIN B-1: CPT

## 2021-08-31 PROCEDURE — 82465 ASSAY BLD/SERUM CHOLESTEROL: CPT

## 2021-08-31 PROCEDURE — 84630 ASSAY OF ZINC: CPT

## 2021-08-31 PROCEDURE — 36415 COLL VENOUS BLD VENIPUNCTURE: CPT

## 2021-08-31 PROCEDURE — 82525 ASSAY OF COPPER: CPT

## 2021-08-31 PROCEDURE — 82728 ASSAY OF FERRITIN: CPT

## 2021-08-31 PROCEDURE — 82390 ASSAY OF CERULOPLASMIN: CPT

## 2021-08-31 PROCEDURE — 84255 ASSAY OF SELENIUM: CPT

## 2021-08-31 PROCEDURE — 84478 ASSAY OF TRIGLYCERIDES: CPT

## 2021-08-31 PROCEDURE — 82746 ASSAY OF FOLIC ACID SERUM: CPT

## 2021-08-31 PROCEDURE — 80053 COMPREHEN METABOLIC PANEL: CPT

## 2021-08-31 PROCEDURE — 84134 ASSAY OF PREALBUMIN: CPT

## 2021-08-31 PROCEDURE — 82607 VITAMIN B-12: CPT

## 2021-09-01 ENCOUNTER — OFFICE VISIT (OUTPATIENT)
Dept: BARIATRICS/WEIGHT MGMT | Age: 41
End: 2021-09-01
Payer: COMMERCIAL

## 2021-09-01 ENCOUNTER — TELEPHONE (OUTPATIENT)
Dept: BARIATRICS/WEIGHT MGMT | Age: 41
End: 2021-09-01

## 2021-09-01 VITALS
HEIGHT: 64 IN | DIASTOLIC BLOOD PRESSURE: 78 MMHG | TEMPERATURE: 98.6 F | BODY MASS INDEX: 43.19 KG/M2 | WEIGHT: 253 LBS | RESPIRATION RATE: 20 BRPM | HEART RATE: 90 BPM | SYSTOLIC BLOOD PRESSURE: 126 MMHG

## 2021-09-01 DIAGNOSIS — K91.2 MALNUTRITION FOLLOWING GASTROINTESTINAL SURGERY: Primary | ICD-10-CM

## 2021-09-01 DIAGNOSIS — R10.11 RUQ PAIN: ICD-10-CM

## 2021-09-01 PROCEDURE — 1036F TOBACCO NON-USER: CPT | Performed by: SURGERY

## 2021-09-01 PROCEDURE — G8427 DOCREV CUR MEDS BY ELIG CLIN: HCPCS | Performed by: SURGERY

## 2021-09-01 PROCEDURE — 99213 OFFICE O/P EST LOW 20 MIN: CPT | Performed by: SURGERY

## 2021-09-01 PROCEDURE — 99211 OFF/OP EST MAY X REQ PHY/QHP: CPT

## 2021-09-01 PROCEDURE — G8417 CALC BMI ABV UP PARAM F/U: HCPCS | Performed by: SURGERY

## 2021-09-01 NOTE — PROGRESS NOTES
4 yr LRYGB f/u. Water intake is over 64 oz daily, pt only take her vitamins when she has them, having bowel movements and is getting protein through foods. Pt c/o wt gain.

## 2021-09-01 NOTE — PATIENT INSTRUCTIONS
Please continue to take your vitamin and mineral supplements as instructed. If you received a blood work prescription today for laboratory monitoring due prior to your next routine follow-up visit, please have this blood work obtained 10 to 14 days prior to your next visit. It is important to fast for 12 hours prior to routine weight loss surgery blood work, EXCEPT for drinking water, to ensure accuracy of results. Please report nausea, vomiting, abdominal pain, or any other problems you experience to your surgeon. For problems related to weight loss surgery, it is best to go to [address] [place] [address] Emergency Department and have your surgeon paged.

## 2021-09-01 NOTE — PROGRESS NOTES
Antonia Campo  9/1/2021  Kelli Roup    Shannon-en- Y Gastric Bypass  4 Year Post-Operative Follow-up     Antonia Campo is a 38 y.o. female who is 4 years post Laparoscopic Shannon-en-Y Gastric Bypass surgery. Reports right sided abdominal pain. She is not having swallowing difficulty, is compliant most of the time with the multivitamins and calcium + Vit D. She is meeting fluid recommendations of at least 64 ounces per day and is meeting protein recommendations. She  is not exercising: no regular exercise. Weight=253 lb (114.8 kg)  Today's weight represents a total weight loss of 62 pounds since surgery with 24 pounds regained since the lowest weight. Prior to Admission medications    Medication Sig Start Date End Date Taking? Authorizing Provider   naltrexone-buPROPion (CONTRAVE) 8-90 MG per extended release tablet Take 2 tablets by mouth 2 times daily 6/5/21  Yes Regis Branch MD   busPIRone (BUSPAR) 10 MG tablet  1/24/21  Yes Historical Provider, MD   vitamin D (ERGOCALCIFEROL) 1.25 MG (70040 UT) CAPS capsule TAKE 1 CAPLET ORALLY TWICE PER WEEK FOR 28 DAYS 12/5/20  Yes Historical Provider, MD   lisinopril (PRINIVIL;ZESTRIL) 10 MG tablet TAKE 1 TABLET BY MOUTH EVERY DAY 12/22/20  Yes Historical Provider, MD   loratadine (CLARITIN) 10 MG tablet TAKE 1 TABLET BY MOUTH EVERY DAY AS NEEDED 12/28/20  Yes Historical Provider, MD   clotrimazole-betamethasone (LOTRISONE) 1-0.05 % cream APPLY TOPICALLY THREE TIMES DAILY 12/29/20  Yes Historical Provider, MD   albuterol sulfate  (90 Base) MCG/ACT inhaler TAKE 1 PUFF INHALED 4 TIMES PER DAY TAKE AS NEEDED 1/12/20  Yes Historical Provider, MD   Diclofenac Sodium  MG TB24 TAKE 1 TABLET, 1 TIME PER DAY.  WITH FOOD 11/19/19  Yes Historical Provider, MD   fluticasone (FLONASE) 50 MCG/ACT nasal spray as needed 3/4/19  Yes Historical Provider, MD   Multiple Vitamins-Minerals (THERAPEUTIC MULTIVITAMIN-MINERALS) tablet Take 1 tablet by mouth 2 times daily Indications: bariatric advantage   Yes Historical Provider, MD   acetaminophen (APAP EXTRA STRENGTH) 500 MG tablet Take 1 tablet by mouth every 6 hours as needed for Pain 5/31/15  Yes Lawanda Bain,           Physical exam:   /78 (Site: Right Upper Arm, Position: Sitting, Cuff Size: Large Adult)   Pulse 90   Temp 98.6 °F (37 °C) (Temporal)   Resp 20   Ht 5' 4\" (1.626 m)   Wt 253 lb (114.8 kg)   BMI 43.43 kg/m²    General appearance: alert, appears stated age and cooperative  Head: Normocephalic, without obvious abnormality, atraumatic  Neck: no adenopathy, no carotid bruit, no JVD, supple, symmetrical, trachea midline and thyroid not enlarged, symmetric, no tenderness/mass/nodules  Lungs: clear to auscultation bilaterally  Heart: regular rate and rhythm  Abdomen: soft, non-tender; bowel sounds normal; no masses,  no organomegaly  Extremities: extremities normal, atraumatic, no cyanosis or edema    Assessment: Post Shannon-en- Y Gastric Bypass. She does not complain of GERD,  does not have sleep apnea,  does not have diabetes,  does not have hypertension off medical treatment. Cholesterol and triglycerides are normal. Malnutrition/hypoalbuminemia, wt regain, ruq pain    Plan: Will get hida with cck, will refer to Dr. Valentine Morris for medical wt loss . Continue to eat a high protein, low calorie diet, eat small portions very slowly and chew well before swallowing. Drink plenty of water and fluids. Make sure to use fiber to keep the bowels regular. Try to exercise 7 days per week, maintain adequate variety and balance. Always notify the clinic if you have any medical problems. Follow up in 6 months.  REORDER several LAB panels TO FOLLOW NEXT VISIT      Physician Signature: Electronically signed by Dr. Regis Branch MD

## 2021-09-02 LAB — CERULOPLASMIN: 21 MG/DL (ref 16–45)

## 2021-09-03 LAB
COPPER: 108 UG/DL (ref 80–155)
SELENIUM: 109 UG/L (ref 23–190)
ZINC: 80.4 UG/DL (ref 60–120)

## 2021-09-04 LAB — VITAMIN B1 WHOLE BLOOD: 82 NMOL/L (ref 70–180)

## 2021-09-29 ENCOUNTER — VIRTUAL VISIT (OUTPATIENT)
Dept: BARIATRICS/WEIGHT MGMT | Age: 41
End: 2021-09-29
Payer: COMMERCIAL

## 2021-09-29 ENCOUNTER — HOSPITAL ENCOUNTER (OUTPATIENT)
Dept: NUCLEAR MEDICINE | Age: 41
Discharge: HOME | End: 2021-09-29
Payer: COMMERCIAL

## 2021-09-29 VITALS — BODY MASS INDEX: 43.43 KG/M2 | WEIGHT: 253 LBS

## 2021-09-29 DIAGNOSIS — R10.11 RUQ PAIN: Primary | ICD-10-CM

## 2021-09-29 DIAGNOSIS — R10.11 RUQ PAIN: ICD-10-CM

## 2021-09-29 PROCEDURE — 3430000000 HC RX DIAGNOSTIC RADIOPHARMACEUTICAL: Performed by: RADIOLOGY

## 2021-09-29 PROCEDURE — 99442 PR PHYS/QHP TELEPHONE EVALUATION 11-20 MIN: CPT | Performed by: SURGERY

## 2021-09-29 PROCEDURE — 2580000003 HC RX 258: Performed by: RADIOLOGY

## 2021-09-29 PROCEDURE — A9537 TC99M MEBROFENIN: HCPCS | Performed by: RADIOLOGY

## 2021-09-29 PROCEDURE — 78227 HEPATOBIL SYST IMAGE W/DRUG: CPT

## 2021-09-29 PROCEDURE — 6360000002 HC RX W HCPCS: Performed by: RADIOLOGY

## 2021-09-29 RX ADMIN — SODIUM CHLORIDE 2.3 MCG: 9 INJECTION, SOLUTION INTRAVENOUS at 09:56

## 2021-09-29 RX ADMIN — Medication 6 MILLICURIE: at 09:05

## 2021-09-29 NOTE — PROGRESS NOTES
Surgery Progress Note            Chief complaint:   No chief complaint on file. Patient Active Problem List   Diagnosis    S/P gastric bypass    Gastroesophageal reflux disease without esophagitis    Marginal ulcer    Hiatal hernia       S: still with severe right sided pain    O: There were no vitals filed for this visit. No intake or output data in the 24 hours ending 09/29/21 1554        Labs:  Lab Results   Component Value Date    WBC 4.4 08/31/2021    WBC 4.5 01/08/2021    WBC 5.0 11/09/2020    HGB 12.4 08/31/2021    HGB 13.2 01/08/2021    HGB 12.6 11/09/2020    HCT 35.5 08/31/2021    HCT 38.8 01/08/2021    HCT 38.2 11/09/2020     Lab Results   Component Value Date    CREATININE 0.7 08/31/2021    BUN 11 08/31/2021     08/31/2021    K 4.3 08/31/2021     (H) 08/31/2021    CO2 24 08/31/2021     No results found for: LIPASE, AMYLASE      Physical exam:   Not done due    A:  Severe right sided abdominal pain    P: will do diagnostic lap with possible closure of internal hernia and cholecystectomy   Discussed the risk, benefits and alternatives of surgery including wound infections, bleeding, scar and hernia formation and the risks of general anesthetic including MI, CVA, sudden death or reactions to anesthetic medications. The patient understands the risks and alternatives and the possibility of converting to an open procedure. All questions were answered to the patient's satisfaction and they freely signed the consent. Felicita Pringle MD, MD  9/29/2021    Consent:  He and/or health care decision maker is aware that that he may receive a bill for this telephone service, depending on his insurance coverage, and has provided verbal consent to proceed: Yes      Documentation:  I communicated with the patient and/or health care decision maker about results.    Details of this discussion including any medical advice provided: plan for surgery      I affirm his is a Patient Initiated Episode with a Patient who has not had a related appointment within my department in the past 7 days or scheduled within the next 24 hours.         Patient's location: home address  Physician  location office address in [facility]  Other people involved in call none          Total Time: minutes: 11-20 minutes

## 2021-09-30 ENCOUNTER — TELEPHONE (OUTPATIENT)
Dept: BARIATRICS/WEIGHT MGMT | Age: 41
End: 2021-09-30

## 2021-09-30 NOTE — TELEPHONE ENCOUNTER
Per order of Dr. Roge Kraft patient is ready to be scheduled for diag /lap and yvette. Per Dr. Roge Kraft no clearances needed. Call placed to patient and she will check her work schedule and call me back. She is not COVID vaccinated. She is agreeable to COVID testing.

## 2021-11-17 ENCOUNTER — HOSPITAL ENCOUNTER (EMERGENCY)
Age: 41
Discharge: HOME | End: 2021-11-17
Attending: EMERGENCY MEDICINE
Payer: COMMERCIAL

## 2021-11-17 VITALS
DIASTOLIC BLOOD PRESSURE: 72 MMHG | OXYGEN SATURATION: 98 % | WEIGHT: 247 LBS | TEMPERATURE: 97.3 F | BODY MASS INDEX: 42.4 KG/M2 | HEART RATE: 84 BPM | RESPIRATION RATE: 20 BRPM | SYSTOLIC BLOOD PRESSURE: 137 MMHG

## 2021-11-17 DIAGNOSIS — K08.89 PAIN, DENTAL: Primary | ICD-10-CM

## 2021-11-17 PROCEDURE — 99283 EMERGENCY DEPT VISIT LOW MDM: CPT

## 2021-11-17 RX ORDER — CLINDAMYCIN HYDROCHLORIDE 150 MG/1
150 CAPSULE ORAL 4 TIMES DAILY
Qty: 40 CAPSULE | Refills: 0 | Status: SHIPPED | OUTPATIENT
Start: 2021-11-17 | End: 2021-11-27

## 2021-11-17 ASSESSMENT — ENCOUNTER SYMPTOMS
NAUSEA: 0
EYE REDNESS: 0
VOMITING: 0
DIARRHEA: 0
SHORTNESS OF BREATH: 0
COUGH: 0
WHEEZING: 0
ABDOMINAL DISTENTION: 0
SINUS PRESSURE: 0
SORE THROAT: 0
EYE DISCHARGE: 0
EYE PAIN: 0
BACK PAIN: 0

## 2021-11-17 ASSESSMENT — PAIN SCALES - GENERAL
PAINLEVEL_OUTOF10: 10
PAINLEVEL_OUTOF10: 10

## 2021-11-17 ASSESSMENT — PAIN DESCRIPTION - PAIN TYPE: TYPE: ACUTE PAIN

## 2021-11-17 ASSESSMENT — PAIN DESCRIPTION - ORIENTATION: ORIENTATION: LEFT

## 2021-11-17 ASSESSMENT — PAIN DESCRIPTION - LOCATION: LOCATION: TEETH

## 2021-11-17 ASSESSMENT — PAIN - FUNCTIONAL ASSESSMENT: PAIN_FUNCTIONAL_ASSESSMENT: 0-10

## 2021-11-18 NOTE — ED PROVIDER NOTES
The history is provided by the patient. Dental Pain  Location:  Lower  Lower teeth location:  28/RL 1st bicuspid  Quality:  Aching  Severity:  Severe  Onset quality:  Sudden  Duration:  1 day  Chronicity:  New  Context: recent dental surgery (tooth extraction)    Associated symptoms: no fever and no headaches         Review of Systems   Constitutional: Negative for chills and fever. HENT: Negative for ear pain, sinus pressure and sore throat. Eyes: Negative for pain, discharge and redness. Respiratory: Negative for cough, shortness of breath and wheezing. Cardiovascular: Negative for chest pain. Gastrointestinal: Negative for abdominal distention, diarrhea, nausea and vomiting. Genitourinary: Negative for dysuria and frequency. Musculoskeletal: Negative for arthralgias and back pain. Skin: Negative for rash and wound. Neurological: Negative for weakness and headaches. Hematological: Negative for adenopathy. All other systems reviewed and are negative. Physical Exam  Vitals and nursing note reviewed. Constitutional:       Appearance: She is well-developed. HENT:      Head: Normocephalic and atraumatic. Right Ear: Hearing, tympanic membrane and external ear normal.      Left Ear: Hearing, tympanic membrane and external ear normal.      Nose: Nose normal.      Mouth/Throat:      Dentition: Gingival swelling present. Pharynx: Uvula midline. Eyes:      General: Lids are normal.      Conjunctiva/sclera: Conjunctivae normal.      Pupils: Pupils are equal, round, and reactive to light. Cardiovascular:      Rate and Rhythm: Normal rate and regular rhythm. Heart sounds: Normal heart sounds. No murmur heard. Pulmonary:      Effort: Pulmonary effort is normal. No respiratory distress. Breath sounds: Normal breath sounds. No wheezing or rales. Abdominal:      General: Bowel sounds are normal.      Palpations: Abdomen is soft. Abdomen is not rigid. Tenderness: There is no abdominal tenderness. There is no guarding or rebound. Musculoskeletal:      Cervical back: Normal range of motion and neck supple. Skin:     General: Skin is warm and dry. Findings: No abrasion or rash. Neurological:      Mental Status: She is alert and oriented to person, place, and time. GCS: GCS eye subscore is 4. GCS verbal subscore is 5. GCS motor subscore is 6. Cranial Nerves: No cranial nerve deficit. Sensory: No sensory deficit. Coordination: Coordination normal.      Gait: Gait normal.          Procedures     MDM          --------------------------------------------- PAST HISTORY ---------------------------------------------  Past Medical History:  has a past medical history of Arthritis and Hypertension. Past Surgical History:  has a past surgical history that includes Abdomen surgery; [place] tooth extraction;  section (,,,,); Upper gastrointestinal endoscopy (2016); Shannon-en-Y Gastric Bypass (2017); Upper gastrointestinal endoscopy (N/A, 10/9/2020); hiatal hernia repair (N/A, 2020); and Dilation and curettage of uterus (N/A, 2021). Social History:  reports that she has quit smoking. Her smoking use included cigarettes. She has never used smokeless tobacco. She reports that she does not drink alcohol and does not use drugs. Family History: family history includes Hearing Loss in her paternal cousin; Heart Disease in her mother. The patients home medications have been reviewed. Allergies: Ibuprofen    -------------------------------------------------- RESULTS -------------------------------------------------  Labs:  No results found for this visit on 21.     Radiology:  No orders to display       ------------------------- NURSING NOTES AND VITALS REVIEWED ---------------------------  Date / Time Roomed:  2021  7:49 PM  ED Bed Assignment:      The nursing notes within the ED encounter and vital signs as below have been reviewed. /72   Pulse 84   Temp 97.3 °F (36.3 °C) (Temporal)   Resp 20   Wt 247 lb (112 kg)   LMP 11/05/2021   SpO2 98%   BMI 42.40 kg/m²   Oxygen Saturation Interpretation: Normal      ------------------------------------------ PROGRESS NOTES ------------------------------------------  I have spoken with the patient and discussed todays results, in addition to providing specific details for the plan of care and counseling regarding the diagnosis and prognosis. Their questions are answered at this time and they are agreeable with the plan. I discussed at length with them reasons for immediate return here for re evaluation. They will followup with primary care by calling their office tomorrow. --------------------------------- ADDITIONAL PROVIDER NOTES ---------------------------------  At this time the patient is without objective evidence of an acute process requiring hospitalization or inpatient management. They have remained hemodynamically stable throughout their entire ED visit and are stable for discharge with outpatient follow-up. The plan has been discussed in detail and they are aware of the specific conditions for emergent return, as well as the importance of follow-up.       New Prescriptions    CLINDAMYCIN (CLEOCIN) 150 MG CAPSULE    Take 1 capsule by mouth 4 times daily for 10 days     Patient's Medications   New Prescriptions    CLINDAMYCIN (CLEOCIN) 150 MG CAPSULE    Take 1 capsule by mouth 4 times daily for 10 days   Previous Medications    ACETAMINOPHEN (APAP EXTRA STRENGTH) 500 MG TABLET    Take 1 tablet by mouth every 6 hours as needed for Pain    ALBUTEROL SULFATE  (90 BASE) MCG/ACT INHALER    TAKE 1 PUFF INHALED 4 TIMES PER DAY TAKE AS NEEDED    BUSPIRONE (BUSPAR) 10 MG TABLET        CLOTRIMAZOLE-BETAMETHASONE (LOTRISONE) 1-0.05 % CREAM    APPLY TOPICALLY THREE TIMES DAILY    DICLOFENAC SODIUM  MG TB24    TAKE 1 TABLET, 1 TIME PER DAY. WITH FOOD    FLUTICASONE (FLONASE) 50 MCG/ACT NASAL SPRAY    as needed    LISINOPRIL (PRINIVIL;ZESTRIL) 10 MG TABLET    TAKE 1 TABLET BY MOUTH EVERY DAY    LORATADINE (CLARITIN) 10 MG TABLET    TAKE 1 TABLET BY MOUTH EVERY DAY AS NEEDED    MULTIPLE VITAMINS-MINERALS (THERAPEUTIC MULTIVITAMIN-MINERALS) TABLET    Take 1 tablet by mouth 2 times daily Indications: bariatric advantage    NALTREXONE-BUPROPION (CONTRAVE) 8-90 MG PER EXTENDED RELEASE TABLET    Take 2 tablets by mouth 2 times daily    VITAMIN D (ERGOCALCIFEROL) 1.25 MG (49678 UT) CAPS CAPSULE    TAKE 1 CAPLET ORALLY TWICE PER WEEK FOR 28 DAYS   Modified Medications    No medications on file   Discontinued Medications    No medications on file         Diagnosis:  1. Pain, dental        Disposition:  Patient's disposition: Discharge to home  Patient's condition is stable.                       Shane Hoover MD  11/17/21 2001

## 2021-12-13 ENCOUNTER — HOSPITAL ENCOUNTER (EMERGENCY)
Age: 41
Discharge: HOME | End: 2021-12-13
Attending: EMERGENCY MEDICINE
Payer: COMMERCIAL

## 2021-12-13 VITALS
HEART RATE: 89 BPM | WEIGHT: 240 LBS | RESPIRATION RATE: 20 BRPM | BODY MASS INDEX: 40.97 KG/M2 | OXYGEN SATURATION: 99 % | SYSTOLIC BLOOD PRESSURE: 182 MMHG | TEMPERATURE: 98.7 F | HEIGHT: 64 IN | DIASTOLIC BLOOD PRESSURE: 99 MMHG

## 2021-12-13 DIAGNOSIS — U07.1 COVID-19: Primary | ICD-10-CM

## 2021-12-13 DIAGNOSIS — U07.1 COVID: ICD-10-CM

## 2021-12-13 LAB
INFLUENZA A BY PCR: NOT DETECTED
INFLUENZA B BY PCR: NOT DETECTED
SARS-COV-2, NAAT: DETECTED

## 2021-12-13 PROCEDURE — 99283 EMERGENCY DEPT VISIT LOW MDM: CPT

## 2021-12-13 PROCEDURE — 87502 INFLUENZA DNA AMP PROBE: CPT

## 2021-12-13 PROCEDURE — 87635 SARS-COV-2 COVID-19 AMP PRB: CPT

## 2021-12-13 RX ORDER — DIPHENHYDRAMINE HYDROCHLORIDE 50 MG/ML
50 INJECTION INTRAMUSCULAR; INTRAVENOUS
Status: CANCELLED | OUTPATIENT
Start: 2021-12-13 | End: 2021-12-13

## 2021-12-13 RX ORDER — METHYLPREDNISOLONE SODIUM SUCCINATE 125 MG/2ML
125 INJECTION, POWDER, LYOPHILIZED, FOR SOLUTION INTRAMUSCULAR; INTRAVENOUS
Status: CANCELLED | OUTPATIENT
Start: 2021-12-13 | End: 2021-12-13

## 2021-12-13 RX ORDER — SODIUM CHLORIDE 0.9 % (FLUSH) 0.9 %
5-40 SYRINGE (ML) INJECTION EVERY 12 HOURS SCHEDULED
Status: CANCELLED | OUTPATIENT
Start: 2021-12-13

## 2021-12-13 RX ORDER — SODIUM CHLORIDE 9 MG/ML
100 INJECTION, SOLUTION INTRAVENOUS CONTINUOUS PRN
Status: CANCELLED | OUTPATIENT
Start: 2021-12-13

## 2021-12-13 RX ORDER — SODIUM CHLORIDE 0.9 % (FLUSH) 0.9 %
5-40 SYRINGE (ML) INJECTION PRN
Status: CANCELLED | OUTPATIENT
Start: 2021-12-13

## 2021-12-13 RX ORDER — SODIUM CHLORIDE 9 MG/ML
25 INJECTION, SOLUTION INTRAVENOUS PRN
Status: CANCELLED | OUTPATIENT
Start: 2021-12-13

## 2021-12-13 RX ORDER — EPINEPHRINE 1 MG/ML
0.3 INJECTION, SOLUTION, CONCENTRATE INTRAVENOUS PRN
Status: CANCELLED | OUTPATIENT
Start: 2021-12-13

## 2021-12-13 RX ORDER — SODIUM CHLORIDE 9 MG/ML
INJECTION, SOLUTION INTRAVENOUS CONTINUOUS PRN
Status: CANCELLED | OUTPATIENT
Start: 2021-12-13 | End: 2021-12-14

## 2021-12-13 NOTE — ED PROVIDER NOTES
[address] [facility]  Department of Emergency Medicine     Written by: Lucia Titus DO  Patient Name: Bhaskar Camp  Attending Provider: Irina Bella MD  Admit Date: 2021  5:41 AM  MRN: [id number]                   : 1980        Chief Complaint   Patient presents with    Generalized Body Aches     Concern for covid. - Chief complaint    Ms. Shanthi Blunt is a 43 yo female with a PMHx of HTN who presents to the ED due to generalized body aches. States she has had 2 days of generalized body aches, fatigue, fever, chills, shortness of breath, decreased appetite and diarrhea. States that her  has also had similar complaints over the past 2 days. States all her symptoms are worsened with exertion only minimally relieved with rest.  She did not receive the Covid vaccination. States her symptoms have been constant since onset and progressively worsening. She denies any abdominal pain, lower extremity edema, headaches or vision changes. Review of Systems   Constitutional: Positive for activity change, appetite change, chills, fatigue and fever. HENT: Negative for congestion, rhinorrhea, sinus pressure, sinus pain and sneezing. Eyes: Negative for pain and redness. Respiratory: Positive for cough, chest tightness and shortness of breath. Cardiovascular: Negative for chest pain, palpitations and leg swelling. Gastrointestinal: Positive for diarrhea. Negative for abdominal pain, constipation, nausea and vomiting. Genitourinary: Negative for dysuria, flank pain, frequency, hematuria and urgency. Musculoskeletal: Positive for myalgias. Negative for arthralgias, back pain, gait problem and joint swelling. Skin: Negative for rash. Neurological: Positive for weakness. Negative for dizziness, syncope, light-headedness, numbness and headaches. Physical Exam  Constitutional:       General: She is not in acute distress. Appearance: Normal appearance.  She is normal weight. HENT:      Head: Normocephalic and atraumatic. Right Ear: External ear normal.      Left Ear: External ear normal.      Mouth/Throat:      Mouth: Mucous membranes are moist.      Pharynx: Oropharynx is clear. Eyes:      Extraocular Movements: Extraocular movements intact. Conjunctiva/sclera: Conjunctivae normal.   Cardiovascular:      Rate and Rhythm: Normal rate and regular rhythm. Pulses: Normal pulses. Heart sounds: Normal heart sounds. Pulmonary:      Effort: Respiratory distress present. Breath sounds: Wheezing present. Abdominal:      General: Abdomen is flat. Bowel sounds are normal.      Palpations: Abdomen is soft. Tenderness: There is no abdominal tenderness. There is no guarding or rebound. Musculoskeletal:         General: No tenderness. Normal range of motion. Cervical back: Normal range of motion and neck supple. No rigidity or tenderness. Skin:     General: Skin is warm and dry. Neurological:      General: No focal deficit present. Mental Status: She is alert and oriented to person, place, and time. Mental status is at baseline. Cranial Nerves: No cranial nerve deficit. Sensory: No sensory deficit. Motor: No weakness. Psychiatric:         Mood and Affect: Mood normal.         Behavior: Behavior normal.          Procedures       MDM  Number of Diagnoses or Management Options  COVID-19  Diagnosis management comments: This is a 43 yo female with a PMHx of HTN who presents to the ED due to generalized body aches. Patient tested negative for rapid flu but positive for Covid. She was given referral to the monoclonal antibody infusion clinic and told that she will receive a call with her appointment date and time. She has been told to go home and quarantine rest and stay adequately hydrated. She will follow up with her PCP regarding her current symptoms if they persist following antibody infusion.   She been told (108.9 kg)   SpO2 99%   BMI 41.20 kg/m²   Oxygen Saturation Interpretation: Normal      ------------------------------------------ PROGRESS NOTES ------------------------------------------  11:55 AM EST  I have spoken with the patient and discussed todays results, in addition to providing specific details for the plan of care and counseling regarding the diagnosis and prognosis. Their questions are answered at this time and they are agreeable with the plan. I discussed at length with them reasons for immediate return here for re evaluation. They will followup with their primary care physician by calling their office tomorrow. --------------------------------- ADDITIONAL PROVIDER NOTES ---------------------------------  At this time the patient is without objective evidence of an acute process requiring hospitalization or inpatient management. They have remained hemodynamically stable throughout their entire ED visit and are stable for discharge with outpatient follow-up. The plan has been discussed in detail and they are aware of the specific conditions for emergent return, as well as the importance of follow-up. Discharge Medication List as of 12/13/2021  7:26 AM          Diagnosis:  1. COVID-19        Disposition:  Patient's disposition: Discharge to home  Patient's condition is stable. Patient was seen and evaluated by myself and my attending Elmira Barron MD. Assessment and Plan discussed with attending provider, please see attestation for final plan of care.      DO Don Chanel DO  Resident  12/14/21 7160

## 2021-12-14 ENCOUNTER — CARE COORDINATION (OUTPATIENT)
Dept: CARE COORDINATION | Age: 41
End: 2021-12-14

## 2021-12-14 ENCOUNTER — HOSPITAL ENCOUNTER (OUTPATIENT)
Dept: INFUSION THERAPY | Age: 41
Setting detail: INFUSION SERIES
Discharge: HOME | End: 2021-12-14
Payer: COMMERCIAL

## 2021-12-14 VITALS
RESPIRATION RATE: 18 BRPM | HEART RATE: 84 BPM | TEMPERATURE: 98.7 F | OXYGEN SATURATION: 99 % | SYSTOLIC BLOOD PRESSURE: 127 MMHG | DIASTOLIC BLOOD PRESSURE: 62 MMHG

## 2021-12-14 PROCEDURE — M0243 CASIRIVI AND IMDEVI INFUSION: HCPCS

## 2021-12-14 PROCEDURE — 2500000003 HC RX 250 WO HCPCS: Performed by: INTERNAL MEDICINE

## 2021-12-14 PROCEDURE — 6360000002 HC RX W HCPCS: Performed by: INTERNAL MEDICINE

## 2021-12-14 PROCEDURE — 2580000003 HC RX 258: Performed by: INTERNAL MEDICINE

## 2021-12-14 RX ORDER — SODIUM CHLORIDE 0.9 % (FLUSH) 0.9 %
5-40 SYRINGE (ML) INJECTION PRN
Status: DISCONTINUED | OUTPATIENT
Start: 2021-12-14 | End: 2021-12-15 | Stop reason: HOSPADM

## 2021-12-14 RX ADMIN — SODIUM CHLORIDE, PRESERVATIVE FREE 10 ML: 5 INJECTION INTRAVENOUS at 13:48

## 2021-12-14 RX ADMIN — SODIUM CHLORIDE, PRESERVATIVE FREE 10 ML: 5 INJECTION INTRAVENOUS at 14:21

## 2021-12-14 RX ADMIN — IMDEVIMAB: 1332 INJECTION, SOLUTION, CONCENTRATE INTRAVENOUS at 13:48

## 2021-12-14 ASSESSMENT — ENCOUNTER SYMPTOMS
CONSTIPATION: 0
COUGH: 1
DIARRHEA: 1
EYE REDNESS: 0
EYE PAIN: 0
VOMITING: 0
RHINORRHEA: 0
SINUS PRESSURE: 0
ABDOMINAL PAIN: 0
BACK PAIN: 0
CHEST TIGHTNESS: 1
NAUSEA: 0
SINUS PAIN: 0
SHORTNESS OF BREATH: 1

## 2021-12-14 NOTE — CARE COORDINATION
Patient contacted regarding COVID-19 risk, exposure, diagnosis, pulse oximeter ordered at discharge and monoclonal antibody infusion follow up. Discussed COVID-19 related testing which was available at this time. Test results were positive. Patient informed of results, if available? Yes. Ambulatory Care Manager contacted the patient by telephone to perform post discharge assessment. Call within 2 business days of discharge: Yes. Verified name and  with patient as identifiers. Provided introduction to self, and explanation of the CTN/ACM role, and reason for call due to risk factors for infection and/or exposure to COVID-19. Symptoms reviewed with patient who verbalized the following symptoms: pain or aching joints, no new symptoms and no worsening symptoms. Due to no new or worsening symptoms encounter was not routed to provider for escalation. Discussed follow-up appointments. If no appointment was previously scheduled, appointment scheduling offered: Yes. [facility] follow up appointment(s):   Future Appointments   Date Time Provider Gerri Potter   2021  1:30 PM [facility]   2022  1:15 PM Nadia Hugo MD Surg [facility]     Non-[facility] follow up appointment(s):   -Pt reports she has not called PCP office yet. She said PCP office will not want her there till she is out of quarantine. ACM discussed calling office, reviewing symptoms and making appt 10 days out if needed. She said she may call. Non-face-to-face services provided:  Reviewed AVS     Advance Care Planning:   Does patient have an Advance Directive:  decision maker updated. Educated patient about risk for severe COVID-19 due to risk factors according to CDC guidelines. ACM reviewed discharge instructions, medical action plan and red flag symptoms with the patient who verbalized understanding. Discussed COVID vaccination status: Yes.  Education provided on COVID-19 vaccination as appropriate. Discussed exposure protocols and quarantine with CDC Guidelines. Patient was given an opportunity to verbalize any questions and concerns and agrees to contact ACM or health care provider for questions related to their healthcare. Reviewed and educated patient on any new and changed medications related to discharge diagnosis     Was patient discharged with a pulse oximeter? No Discussed and confirmed pulse oximeter discharge instructions and when to notify provider or seek emergency care.     -Pt reports feeling ok. No fever, cough, SOB or loss of taste/smell. Pt intermittently takes Tylenol for the body aches with some relief. Pt does not have a pulse ox monitor.   -Pt reports keeping hydrated and appetite is normal.   -Antibody infusion therapy appt today 12- at 1:30 pm.   -Pt reports her  Ed is currently in the hospital with Covid fighting for his life. Pt lives with her children. Discussed Covid precautions. Pt quarantining in her room. Pt said everyone is wearing masks and the children are also wearing gloves. She is disinfecting all touch surfaces.   -Offered MyChart, Pt declined.   -Pt reports she has not received the Covid vaccine. Pt is not sure if she is interested in the vaccine when eligible. AC provided contact information. Plan for follow-up call in 3-5 days based on severity of symptoms and risk factors.

## 2021-12-17 ENCOUNTER — CARE COORDINATION (OUTPATIENT)
Dept: CARE COORDINATION | Age: 41
End: 2021-12-17

## 2021-12-17 NOTE — CARE COORDINATION
You Patient resolved from the Care Transitions episode on 12-  Discussed COVID-19 related testing which was available at this time. Test results were positive. Patient informed of results, if available? Yes    Patient/family has been provided the following resources and education related to COVID-19:                         Signs, symptoms and red flags related to COVID-19            CDC exposure and quarantine guidelines            [facility] exposure contact - [phone]            Contact for their local Department of Health                 Patient currently reports that the following symptoms have improved:    -Pt reports all symptoms resolved. Pt said she felt much better after the Regeneron infusion.   -Pt reports keeping hydrated and appetite is normal.  -Pt reports she spoke with PCP office. Pt looking to have Covid test retaken when quarantine up (12/23/2021) so she can go back to work. [facility] gave Pt contact information & location for [place] walk in [facility]. She said she will call and check.   -Pt reports all her children tested Covid negative and now she is getting them all Covid vaccinated. Pt said her  remains in the hospital with Covid. -Pt reports she spoke with [facility] and her quarantine is up 12-.   -Pt in agreement with ACM signing off. No further outreach scheduled with this CTN/ACM. Episode of Care resolved. Patient has this CTN/ACM contact information if future needs arise.

## 2022-07-06 ENCOUNTER — HOSPITAL ENCOUNTER (EMERGENCY)
Age: 42
Discharge: HOME | End: 2022-07-06
Attending: EMERGENCY MEDICINE
Payer: COMMERCIAL

## 2022-07-06 VITALS
HEIGHT: 64 IN | SYSTOLIC BLOOD PRESSURE: 183 MMHG | BODY MASS INDEX: 39.27 KG/M2 | OXYGEN SATURATION: 100 % | RESPIRATION RATE: 22 BRPM | WEIGHT: 230 LBS | HEART RATE: 100 BPM | TEMPERATURE: 97.3 F | DIASTOLIC BLOOD PRESSURE: 81 MMHG

## 2022-07-06 DIAGNOSIS — J45.901 EXACERBATION OF ASTHMA, UNSPECIFIED ASTHMA SEVERITY, UNSPECIFIED WHETHER PERSISTENT: Primary | ICD-10-CM

## 2022-07-06 PROCEDURE — 99284 EMERGENCY DEPT VISIT MOD MDM: CPT

## 2022-07-06 PROCEDURE — 96372 THER/PROPH/DIAG INJ SC/IM: CPT

## 2022-07-06 PROCEDURE — 6360000002 HC RX W HCPCS: Performed by: EMERGENCY MEDICINE

## 2022-07-06 RX ORDER — METHYLPREDNISOLONE 4 MG/1
TABLET ORAL
Qty: 1 KIT | Refills: 0 | Status: SHIPPED | OUTPATIENT
Start: 2022-07-06 | End: 2022-07-12

## 2022-07-06 RX ORDER — DEXAMETHASONE SODIUM PHOSPHATE 10 MG/ML
10 INJECTION, SOLUTION INTRAMUSCULAR; INTRAVENOUS ONCE
Status: COMPLETED | OUTPATIENT
Start: 2022-07-06 | End: 2022-07-06

## 2022-07-06 RX ADMIN — DEXAMETHASONE SODIUM PHOSPHATE 10 MG: 10 INJECTION, SOLUTION INTRAMUSCULAR; INTRAVENOUS at 15:28

## 2022-07-06 ASSESSMENT — PAIN DESCRIPTION - ONSET: ONSET: ON-GOING

## 2022-07-06 ASSESSMENT — PAIN SCALES - GENERAL: PAINLEVEL_OUTOF10: 8

## 2022-07-06 ASSESSMENT — PAIN - FUNCTIONAL ASSESSMENT: PAIN_FUNCTIONAL_ASSESSMENT: 0-10

## 2022-07-06 ASSESSMENT — PAIN DESCRIPTION - DESCRIPTORS: DESCRIPTORS: BURNING

## 2022-07-06 ASSESSMENT — PAIN DESCRIPTION - FREQUENCY: FREQUENCY: CONTINUOUS

## 2022-07-06 ASSESSMENT — PAIN DESCRIPTION - LOCATION: LOCATION: CHEST;BACK

## 2022-07-06 ASSESSMENT — PAIN DESCRIPTION - PAIN TYPE: TYPE: ACUTE PAIN

## 2022-07-06 NOTE — ED PROVIDER NOTES
HPI:  22,   Time: 3:30 PM EDT         Stephen Wright is a 42 y.o. female presenting to the ED for chief complaint: Flareup of asthma/feeling short of breath at times despite albuterol x 1 day. Patient denies fever chest pain shortness of breath palpitations nausea vomiting or diarrhea  ROS:   Pertinent positives and negatives are stated within HPI, all other systems reviewed and are negative.  --------------------------------------------- PAST HISTORY ---------------------------------------------  Past Medical History:  has a past medical history of Arthritis and Hypertension. Past Surgical History:  has a past surgical history that includes Abdomen surgery; [place] tooth extraction;  section (,,,,); Upper gastrointestinal endoscopy (2016); Shannon-en-Y Gastric Bypass (2017); Upper gastrointestinal endoscopy (N/A, 10/9/2020); hiatal hernia repair (N/A, 2020); and Dilation and curettage of uterus (N/A, 2021). Social History:  reports that she has quit smoking. Her smoking use included cigarettes. She has never used smokeless tobacco. She reports that she does not drink alcohol and does not use drugs. Family History: family history includes Hearing Loss in her paternal cousin; Heart Disease in her mother. The patients home medications have been reviewed. Allergies: Ibuprofen    -------------------------------------------------- RESULTS -------------------------------------------------  All laboratory and radiology results have been personally reviewed by myself   LABS:  No results found for this visit on 22. RADIOLOGY:  Interpreted by Radiologist.  No orders to display       ------------------------- NURSING NOTES AND VITALS REVIEWED ---------------------------   The nursing notes within the ED encounter and vital signs as below have been reviewed.    BP (!) 183/81   Pulse 100   Temp 97.3 °F (36.3 °C) (Temporal)   Resp 22   Ht 5' 4\" (1.626 m)   Wt 230 lb (104.3 kg)   LMP 06/15/2022   SpO2 100%   BMI 39.48 kg/m²   Oxygen Saturation Interpretation: Normal      ---------------------------------------------------PHYSICAL EXAM--------------------------------------      Constitutional/General: Alert and oriented x3, well appearing, non toxic in NAD  Head: NC/AT  Eyes: PERRL, EOMI  Mouth: Oropharynx clear, handling secretions, no trismus  Neck: Supple, full ROM, no meningeal signs  Pulmonary: Lungs clear to auscultation bilaterally, no wheezes, rales, or rhonchi. Not in respiratory distress  Cardiovascular:  Regular rate and rhythm, no murmurs, gallops, or rubs. 2+ distal pulses  Abdomen: Soft, non tender, non distended,   Extremities: Moves all extremities x 4. Warm and well perfused  Skin: warm and dry without rash  Neurologic: GCS 15,  Psych: Normal Affect      ------------------------------ ED COURSE/MEDICAL DECISION MAKING----------------------  Medications   dexamethasone (PF) (DECADRON) injection 10 mg (10 mg IntraMUSCular Given 7/6/22 1528)         Medical Decision Making:      Patient's Medications   New Prescriptions    METHYLPREDNISOLONE (MEDROL, MURRAY,) 4 MG TABLET    Take by mouth. Previous Medications    ACETAMINOPHEN (APAP EXTRA STRENGTH) 500 MG TABLET    Take 1 tablet by mouth every 6 hours as needed for Pain    ALBUTEROL SULFATE  (90 BASE) MCG/ACT INHALER    TAKE 1 PUFF INHALED 4 TIMES PER DAY TAKE AS NEEDED    BUSPIRONE (BUSPAR) 10 MG TABLET        CLOTRIMAZOLE-BETAMETHASONE (LOTRISONE) 1-0.05 % CREAM    APPLY TOPICALLY THREE TIMES DAILY    DICLOFENAC SODIUM  MG TB24    TAKE 1 TABLET, 1 TIME PER DAY.  WITH FOOD    FLUTICASONE (FLONASE) 50 MCG/ACT NASAL SPRAY    as needed    LISINOPRIL (PRINIVIL;ZESTRIL) 10 MG TABLET    TAKE 1 TABLET BY MOUTH EVERY DAY    LORATADINE (CLARITIN) 10 MG TABLET    TAKE 1 TABLET BY MOUTH EVERY DAY AS NEEDED    MULTIPLE VITAMINS-MINERALS (THERAPEUTIC MULTIVITAMIN-MINERALS) TABLET    Take 1 tablet by mouth 2 times daily Indications: bariatric advantage    VITAMIN D (ERGOCALCIFEROL) 1.25 MG (05866 UT) CAPS CAPSULE    TAKE 1 CAPLET ORALLY TWICE PER WEEK FOR 28 DAYS   Modified Medications    No medications on file   Discontinued Medications    NALTREXONE-BUPROPION (CONTRAVE) 8-90 MG PER EXTENDED RELEASE TABLET    Take 2 tablets by mouth 2 times daily         Counseling: The emergency provider has spoken with the patient and discussed todays results, in addition to providing specific details for the plan of care and counseling regarding the diagnosis and prognosis. Questions are answered at this time and they are agreeable with the plan.      --------------------------------- IMPRESSION AND DISPOSITION ---------------------------------    IMPRESSION  1.  Exacerbation of asthma, unspecified asthma severity, unspecified whether persistent        DISPOSITION  Disposition: Discharge to home  Patient condition is good                  Jacinto Mantilla MD  07/06/22 4226

## 2022-07-11 ENCOUNTER — HOSPITAL ENCOUNTER (EMERGENCY)
Age: 42
Discharge: HOME | End: 2022-07-11
Attending: STUDENT IN AN ORGANIZED HEALTH CARE EDUCATION/TRAINING PROGRAM
Payer: COMMERCIAL

## 2022-07-11 ENCOUNTER — HOSPITAL ENCOUNTER (EMERGENCY)
Age: 42
Discharge: LEFT BEFORE DISCHARGE | End: 2022-07-11

## 2022-07-11 ENCOUNTER — APPOINTMENT (OUTPATIENT)
Dept: GENERAL RADIOLOGY | Age: 42
End: 2022-07-11
Payer: COMMERCIAL

## 2022-07-11 VITALS
TEMPERATURE: 98 F | HEART RATE: 96 BPM | OXYGEN SATURATION: 100 % | BODY MASS INDEX: 39.27 KG/M2 | DIASTOLIC BLOOD PRESSURE: 82 MMHG | SYSTOLIC BLOOD PRESSURE: 128 MMHG | RESPIRATION RATE: 16 BRPM | WEIGHT: 230 LBS | HEIGHT: 64 IN

## 2022-07-11 VITALS — RESPIRATION RATE: 18 BRPM | OXYGEN SATURATION: 100 % | HEART RATE: 98 BPM | TEMPERATURE: 98.5 F

## 2022-07-11 DIAGNOSIS — Z76.89 ENCOUNTER FOR ASSESSMENT OF STD EXPOSURE: ICD-10-CM

## 2022-07-11 DIAGNOSIS — R06.02 SHORTNESS OF BREATH: Primary | ICD-10-CM

## 2022-07-11 DIAGNOSIS — T19.2XXA FOREIGN BODY IN VAGINA, INITIAL ENCOUNTER: ICD-10-CM

## 2022-07-11 LAB
BACTERIA: ABNORMAL /HPF
BILIRUBIN URINE: NEGATIVE
BLOOD, URINE: NEGATIVE
CLARITY: CLEAR
COLOR: YELLOW
EPITHELIAL CELLS, UA: ABNORMAL /HPF
GLUCOSE URINE: NEGATIVE MG/DL
HCG(URINE) PREGNANCY TEST: NEGATIVE
KETONES, URINE: NEGATIVE MG/DL
LEUKOCYTE ESTERASE, URINE: ABNORMAL
NITRITE, URINE: NEGATIVE
PH UA: 6.5 (ref 5–9)
PROTEIN UA: NEGATIVE MG/DL
RBC UA: ABNORMAL /HPF (ref 0–2)
SPECIFIC GRAVITY UA: 1.02 (ref 1–1.03)
UROBILINOGEN, URINE: 0.2 E.U./DL
WBC UA: ABNORMAL /HPF (ref 0–5)

## 2022-07-11 PROCEDURE — 6360000002 HC RX W HCPCS: Performed by: STUDENT IN AN ORGANIZED HEALTH CARE EDUCATION/TRAINING PROGRAM

## 2022-07-11 PROCEDURE — 87491 CHLMYD TRACH DNA AMP PROBE: CPT

## 2022-07-11 PROCEDURE — 81001 URINALYSIS AUTO W/SCOPE: CPT

## 2022-07-11 PROCEDURE — 71046 X-RAY EXAM CHEST 2 VIEWS: CPT

## 2022-07-11 PROCEDURE — 87591 N.GONORRHOEAE DNA AMP PROB: CPT

## 2022-07-11 PROCEDURE — 96372 THER/PROPH/DIAG INJ SC/IM: CPT

## 2022-07-11 PROCEDURE — 99284 EMERGENCY DEPT VISIT MOD MDM: CPT

## 2022-07-11 PROCEDURE — 81025 URINE PREGNANCY TEST: CPT

## 2022-07-11 RX ORDER — CEFTRIAXONE 500 MG/1
500 INJECTION, POWDER, FOR SOLUTION INTRAMUSCULAR; INTRAVENOUS ONCE
Status: COMPLETED | OUTPATIENT
Start: 2022-07-11 | End: 2022-07-11

## 2022-07-11 RX ORDER — DOXYCYCLINE HYCLATE 100 MG
100 TABLET ORAL 2 TIMES DAILY
Qty: 14 TABLET | Refills: 0 | Status: SHIPPED | OUTPATIENT
Start: 2022-07-11 | End: 2022-07-18

## 2022-07-11 RX ADMIN — CEFTRIAXONE SODIUM 500 MG: 500 INJECTION, POWDER, FOR SOLUTION INTRAMUSCULAR; INTRAVENOUS at 19:37

## 2022-07-11 ASSESSMENT — ENCOUNTER SYMPTOMS
COLOR CHANGE: 0
VOMITING: 0
NAUSEA: 0
COUGH: 0
DIARRHEA: 0
SHORTNESS OF BREATH: 1
ABDOMINAL PAIN: 0
BACK PAIN: 0

## 2022-07-11 ASSESSMENT — PAIN - FUNCTIONAL ASSESSMENT: PAIN_FUNCTIONAL_ASSESSMENT: NONE - DENIES PAIN

## 2022-07-11 NOTE — ED PROVIDER NOTES
HPI   This a 18-year-old female patient who presents to the emergency department for evaluation of shortness of breath as well as retained vaginal condom. Patient reporting that she was seen here her symptoms on July 6. On chart review she was diagnosed with an asthma exacerbation given inhaler as well as steroid pack to go home with. She states that she feels like the inhaler was initially helping but no longer. She has exertional shortness of breath and chest tightness. Last month she traveled to [place] by airplane as did she a few days ago. Denies any calf pain or leg swelling, no past history of blood clots or trauma. Patient also notes she was having intercourse yesterday and she believes that the condom was left inside of her. She is denying any vaginal discharge, dysuria, hematuria fevers chills abdominal pain. Review of Systems   Constitutional: Negative for chills and fever. HENT: Negative for congestion. Respiratory: Positive for shortness of breath (Exertional dyspnea). Negative for cough. Cardiovascular: Negative for chest pain. Tightness   Gastrointestinal: Negative for abdominal pain, diarrhea, nausea and vomiting. Genitourinary: Negative for difficulty urinating, dysuria and hematuria. Retained vaginal foreign body   Musculoskeletal: Negative for back pain. Skin: Negative for color change. All other systems reviewed and are negative. Physical Exam  Vitals and nursing note reviewed. Constitutional:       Appearance: Normal appearance. HENT:      Head: Normocephalic and atraumatic. Nose: Nose normal. No congestion. Mouth/Throat:      Mouth: Mucous membranes are moist.      Pharynx: Oropharynx is clear. Eyes:      Conjunctiva/sclera: Conjunctivae normal.      Pupils: Pupils are equal, round, and reactive to light. Cardiovascular:      Rate and Rhythm: Normal rate and regular rhythm. Pulses: Normal pulses.       Heart sounds: Normal heart sounds. Pulmonary:      Effort: Pulmonary effort is normal. No respiratory distress. Breath sounds: Normal breath sounds. Abdominal:      General: Bowel sounds are normal. There is no distension. Tenderness: There is no abdominal tenderness. Musculoskeletal:         General: Normal range of motion. Cervical back: Normal range of motion and neck supple. Skin:     General: Skin is warm and dry. Capillary Refill: Capillary refill takes less than 2 seconds. Neurological:      General: No focal deficit present. Mental Status: She is alert. Procedures     MDM   41-year-old female patient presenting to emergency department for evaluation of shortness of breath. She has been having worsening exertional dyspnea over the last couple of weeks. She is having no chest pain or shortness of breath at rest. chest X-ray was clear here. n she has been traveling to [facility] by plane cannot rule out pulmonary embolism. She was in [facility] yesterday and last month. At this time I referred her to go to the emergency department for further evaluation. Patient did not give me direct confirmation whether or not she will go to the emergency department. Explained to her the risks of having a possible blood clot in her lungs, she is understanding of this. She is alert and oriented x3 she has full capacity to make decisions on her own. I did remove condom from patient's vaginal canal.  She requested STD testing and empiric treatment. She was given a dose of Rocephin here and a week of doxycycline to go home with. ED Course as of 07/11/22 2124 Mon Jul 11, 2022   1934 Female nurse present in room with me during vaginal examination speculum placed into vaginal canal condom was visualized and pulled out with forceps. No erythema or discharge noted.  [FG]      ED Course User Index  [FG] Gabriela Arevalo, DO        Medications   cefTRIAXone (ROCEPHIN) injection 500 mg (500 mg IntraMUSCular Given 22)           --------------------------------------------- PAST HISTORY ---------------------------------------------  Past Medical History:  has a past medical history of Arthritis and Hypertension. Past Surgical History:  has a past surgical history that includes Abdomen surgery; [place] tooth extraction;  section (,,,,); Upper gastrointestinal endoscopy (2016); Shannon-en-Y Gastric Bypass (2017); Upper gastrointestinal endoscopy (N/A, 10/9/2020); hiatal hernia repair (N/A, 2020); and Dilation and curettage of uterus (N/A, 2021). Social History:  reports that she has quit smoking. Her smoking use included cigarettes. She has never used smokeless tobacco. She reports that she does not drink alcohol and does not use drugs. Family History: family history includes Hearing Loss in her paternal cousin; Heart Disease in her mother. The patients home medications have been reviewed.     Allergies: Ibuprofen    -------------------------------------------------- RESULTS -------------------------------------------------  Labs:  Results for orders placed or performed during the hospital encounter of 22   C.trachomatis N.gonorrhoeae DNA, Urine    Specimen: Urine   Result Value Ref Range    Source Urine    Urinalysis with Microscopic   Result Value Ref Range    Color, UA Yellow Straw/Yellow    Clarity, UA Clear Clear    Glucose, Ur Negative Negative mg/dL    Bilirubin Urine Negative Negative    Ketones, Urine Negative Negative mg/dL    Specific Gravity, UA 1.020 1.005 - 1.030    Blood, Urine Negative Negative    pH, UA 6.5 5.0 - 9.0    Protein, UA Negative Negative mg/dL    Urobilinogen, Urine 0.2 <2.0 E.U./dL    Nitrite, Urine Negative Negative    Leukocyte Esterase, Urine TRACE (A) Negative    WBC, UA 1-3 0 - 5 /HPF    RBC, UA 1-3 0 - 2 /HPF    Epithelial Cells, UA RARE /HPF    Bacteria, UA RARE (A) None Seen /HPF   Pregnancy, urine   Result Value Ref Range    HCG(Urine) Pregnancy Test NEGATIVE NEGATIVE       Radiology:  XR CHEST (2 VW)   Final Result   No acute process. ------------------------- NURSING NOTES AND VITALS REVIEWED ---------------------------  Date / Time Roomed:  7/11/2022  6:51 PM  ED Bed Assignment:  02/02    The nursing notes within the ED encounter and vital signs as below have been reviewed. /82   Pulse 96   Temp 98 °F (36.7 °C) (Temporal)   Resp 16   Ht 5' 4\" (1.626 m)   Wt 230 lb (104.3 kg)   LMP 06/15/2022   SpO2 100%   BMI 39.48 kg/m²   Oxygen Saturation Interpretation: Normal    --------------------------------- ADDITIONAL PROVIDER NOTES ---------------------------------  At this time the patient is without objective evidence of an acute process requiring hospitalization or inpatient management. They have remained hemodynamically stable throughout their entire ED visit and are stable for discharge with outpatient follow-up. The plan has been discussed in detail and they are aware of the specific conditions for emergent return, as well as the importance of follow-up. Discharge Medication List as of 7/11/2022  7:57 PM      START taking these medications    Details   doxycycline hyclate (VIBRA-TABS) 100 MG tablet Take 1 tablet by mouth 2 times daily for 7 days, Disp-14 tablet, R-0Normal             Diagnosis:  1. Shortness of breath    2. Foreign body in vagina, initial encounter    3. Encounter for assessment of STD exposure        Disposition:  Patient's disposition: Discharge to home  Patient's condition is stable.        Kristyn Farris DO  Resident  07/11/22 4743

## 2022-07-15 ENCOUNTER — HOSPITAL ENCOUNTER (EMERGENCY)
Age: 42
Discharge: HOME | End: 2022-07-15
Attending: EMERGENCY MEDICINE
Payer: COMMERCIAL

## 2022-07-15 VITALS
SYSTOLIC BLOOD PRESSURE: 142 MMHG | DIASTOLIC BLOOD PRESSURE: 86 MMHG | WEIGHT: 230 LBS | OXYGEN SATURATION: 100 % | HEART RATE: 92 BPM | RESPIRATION RATE: 16 BRPM | BODY MASS INDEX: 39.27 KG/M2 | TEMPERATURE: 97.5 F | HEIGHT: 64 IN

## 2022-07-15 DIAGNOSIS — B37.31 VAGINAL YEAST INFECTION: Primary | ICD-10-CM

## 2022-07-15 PROCEDURE — 99283 EMERGENCY DEPT VISIT LOW MDM: CPT

## 2022-07-15 RX ORDER — FLUCONAZOLE 150 MG/1
150 TABLET ORAL ONCE
Qty: 1 TABLET | Refills: 0 | Status: SHIPPED | OUTPATIENT
Start: 2022-07-15 | End: 2022-07-15

## 2022-07-15 ASSESSMENT — ENCOUNTER SYMPTOMS
ABDOMINAL DISTENTION: 0
DIARRHEA: 0
EYE REDNESS: 0
SINUS PRESSURE: 0
EYE DISCHARGE: 0
EYE PAIN: 0
VOMITING: 0
SORE THROAT: 0
WHEEZING: 0
SHORTNESS OF BREATH: 0
NAUSEA: 0
COUGH: 0
BACK PAIN: 0

## 2022-07-15 ASSESSMENT — PAIN - FUNCTIONAL ASSESSMENT: PAIN_FUNCTIONAL_ASSESSMENT: NONE - DENIES PAIN

## 2022-07-15 NOTE — ED PROVIDER NOTES
On Rx for CDC Prophy for GC/Chlamydia, developed yeast infection    The history is provided by the patient. Female  Problem  Associated symptoms: vaginal discharge    Associated symptoms: no fever, no nausea and no vomiting    Risk factors: sexually active and sexually transmitted infections       Review of Systems   Constitutional:  Negative for chills and fever. HENT:  Negative for ear pain, sinus pressure and sore throat. Eyes:  Negative for pain, discharge and redness. Respiratory:  Negative for cough, shortness of breath and wheezing. Cardiovascular:  Negative for chest pain. Gastrointestinal:  Negative for abdominal distention, diarrhea, nausea and vomiting. Genitourinary:  Positive for vaginal discharge. Negative for dysuria and frequency. Musculoskeletal:  Negative for arthralgias and back pain. Skin:  Negative for rash and wound. Neurological:  Negative for weakness and headaches. Hematological:  Negative for adenopathy. All other systems reviewed and are negative. Physical Exam  Vitals and nursing note reviewed. Constitutional:       Appearance: She is well-developed. HENT:      Head: Normocephalic and atraumatic. Eyes:      Pupils: Pupils are equal, round, and reactive to light. Cardiovascular:      Rate and Rhythm: Normal rate and regular rhythm. Heart sounds: Normal heart sounds. No murmur heard. Pulmonary:      Effort: Pulmonary effort is normal. No respiratory distress. Breath sounds: Normal breath sounds. No wheezing or rales. Abdominal:      General: Bowel sounds are normal.      Palpations: Abdomen is soft. Tenderness: There is no abdominal tenderness. There is no guarding or rebound. Musculoskeletal:      Cervical back: Normal range of motion and neck supple. Skin:     General: Skin is warm and dry. Neurological:      Mental Status: She is alert and oriented to person, place, and time. Cranial Nerves: No cranial nerve deficit. Coordination: Coordination normal.        Procedures     [facility]     --------------------------------------------- PAST HISTORY ---------------------------------------------  Past Medical History:  has a past medical history of Arthritis and Hypertension. Past Surgical History:  has a past surgical history that includes Abdomen surgery; [place] tooth extraction;  section (,,,,); Upper gastrointestinal endoscopy (2016); Shannon-en-Y Gastric Bypass (2017); Upper gastrointestinal endoscopy (N/A, 10/9/2020); hiatal hernia repair (N/A, 2020); and Dilation and curettage of uterus (N/A, 2021). Social History:  reports that she has quit smoking. Her smoking use included cigarettes. She has never used smokeless tobacco. She reports that she does not drink alcohol and does not use drugs. Family History: family history includes Hearing Loss in her paternal cousin; Heart Disease in her mother. The patients home medications have been reviewed. Allergies: Ibuprofen    -------------------------------------------------- RESULTS -------------------------------------------------  Results for orders placed or performed during the hospital encounter of 07/15/22   C.trachomatis N.gonorrhoeae DNA, Urine    Specimen: Urine   Result Value Ref Range    Source Urine      No orders to display       ------------------------- NURSING NOTES AND VITALS REVIEWED ---------------------------   The nursing notes within the ED encounter and vital signs as below have been reviewed.    BP (!) 142/86   Pulse 92   Temp 97.5 °F (36.4 °C) (Temporal)   Resp 16   Ht 5' 4\" (1.626 m)   Wt 230 lb (104.3 kg)   LMP 06/15/2022   SpO2 100%   BMI 39.48 kg/m²   Oxygen Saturation Interpretation: Normal      ------------------------------------------ PROGRESS NOTES ------------------------------------------   I have spoken with the patient and discussed todays results, in addition to providing specific details for the plan of care and counseling regarding the diagnosis and prognosis. Their questions are answered at this time and they are agreeable with the plan.      --------------------------------- ADDITIONAL PROVIDER NOTES ---------------------------------        This patient is stable for discharge. I have shared the specific conditions for return, as well as the importance of follow-up. IMPRESSION:     1. Vaginal yeast infection      Patient's Medications   New Prescriptions    FLUCONAZOLE (DIFLUCAN) 150 MG TABLET    Take 1 tablet by mouth once for 1 dose   Previous Medications    ACETAMINOPHEN (APAP EXTRA STRENGTH) 500 MG TABLET    Take 1 tablet by mouth every 6 hours as needed for Pain    ALBUTEROL SULFATE  (90 BASE) MCG/ACT INHALER    TAKE 1 PUFF INHALED 4 TIMES PER DAY TAKE AS NEEDED    BUSPIRONE (BUSPAR) 10 MG TABLET        CLOTRIMAZOLE-BETAMETHASONE (LOTRISONE) 1-0.05 % CREAM    APPLY TOPICALLY THREE TIMES DAILY    DICLOFENAC SODIUM  MG TB24    TAKE 1 TABLET, 1 TIME PER DAY.  WITH FOOD    DOXYCYCLINE HYCLATE (VIBRA-TABS) 100 MG TABLET    Take 1 tablet by mouth 2 times daily for 7 days    FLUTICASONE (FLONASE) 50 MCG/ACT NASAL SPRAY    as needed    LISINOPRIL (PRINIVIL;ZESTRIL) 10 MG TABLET    TAKE 1 TABLET BY MOUTH EVERY DAY    LORATADINE (CLARITIN) 10 MG TABLET    TAKE 1 TABLET BY MOUTH EVERY DAY AS NEEDED    MULTIPLE VITAMINS-MINERALS (THERAPEUTIC MULTIVITAMIN-MINERALS) TABLET    Take 1 tablet by mouth 2 times daily Indications: bariatric advantage    VITAMIN D (ERGOCALCIFEROL) 1.25 MG (77012 UT) CAPS CAPSULE    TAKE 1 CAPLET ORALLY TWICE PER WEEK FOR 28 DAYS   Modified Medications    No medications on file   Discontinued Medications    No medications on file                   Shen Licona DO  07/15/22 9850

## 2022-07-17 LAB
C. TRACHOMATIS DNA ,URINE: NEGATIVE
N. GONORRHOEAE DNA, URINE: NEGATIVE
SOURCE: NORMAL

## 2022-08-09 DIAGNOSIS — K91.2 MALNUTRITION FOLLOWING GASTROINTESTINAL SURGERY: Primary | ICD-10-CM

## 2022-09-30 ENCOUNTER — APPOINTMENT (OUTPATIENT)
Dept: GENERAL RADIOLOGY | Age: 42
End: 2022-09-30
Payer: COMMERCIAL

## 2022-09-30 ENCOUNTER — HOSPITAL ENCOUNTER (EMERGENCY)
Age: 42
Discharge: HOME | End: 2022-09-30
Attending: FAMILY MEDICINE
Payer: COMMERCIAL

## 2022-09-30 VITALS
TEMPERATURE: 97.3 F | RESPIRATION RATE: 16 BRPM | SYSTOLIC BLOOD PRESSURE: 140 MMHG | HEIGHT: 64 IN | OXYGEN SATURATION: 100 % | WEIGHT: 230 LBS | BODY MASS INDEX: 39.27 KG/M2 | DIASTOLIC BLOOD PRESSURE: 82 MMHG | HEART RATE: 108 BPM

## 2022-09-30 DIAGNOSIS — M47.812 CERVICAL ARTHRITIS: ICD-10-CM

## 2022-09-30 DIAGNOSIS — M54.12 CERVICAL RADICULOPATHY: Primary | ICD-10-CM

## 2022-09-30 PROCEDURE — 72050 X-RAY EXAM NECK SPINE 4/5VWS: CPT

## 2022-09-30 PROCEDURE — 99284 EMERGENCY DEPT VISIT MOD MDM: CPT

## 2022-09-30 PROCEDURE — 6360000002 HC RX W HCPCS: Performed by: FAMILY MEDICINE

## 2022-09-30 PROCEDURE — 96372 THER/PROPH/DIAG INJ SC/IM: CPT

## 2022-09-30 RX ORDER — PREDNISONE 10 MG/1
TABLET ORAL
Qty: 21 TABLET | Refills: 0 | Status: SHIPPED | OUTPATIENT
Start: 2022-09-30

## 2022-09-30 RX ORDER — METHYLPREDNISOLONE SODIUM SUCCINATE 125 MG/2ML
125 INJECTION, POWDER, LYOPHILIZED, FOR SOLUTION INTRAMUSCULAR; INTRAVENOUS ONCE
Status: COMPLETED | OUTPATIENT
Start: 2022-09-30 | End: 2022-09-30

## 2022-09-30 RX ORDER — CYCLOBENZAPRINE HCL 10 MG
10 TABLET ORAL 3 TIMES DAILY PRN
Qty: 21 TABLET | Refills: 0 | Status: SHIPPED | OUTPATIENT
Start: 2022-09-30 | End: 2022-10-10

## 2022-09-30 RX ADMIN — METHYLPREDNISOLONE SODIUM SUCCINATE 125 MG: 125 INJECTION, POWDER, FOR SOLUTION INTRAMUSCULAR; INTRAVENOUS at 13:50

## 2022-09-30 ASSESSMENT — PAIN - FUNCTIONAL ASSESSMENT: PAIN_FUNCTIONAL_ASSESSMENT: 0-10

## 2022-09-30 ASSESSMENT — PAIN DESCRIPTION - PAIN TYPE: TYPE: ACUTE PAIN

## 2022-09-30 ASSESSMENT — PAIN DESCRIPTION - DESCRIPTORS: DESCRIPTORS: ACHING

## 2022-09-30 ASSESSMENT — PAIN SCALES - GENERAL: PAINLEVEL_OUTOF10: 10

## 2022-09-30 ASSESSMENT — PAIN DESCRIPTION - ORIENTATION: ORIENTATION: RIGHT

## 2022-09-30 ASSESSMENT — PAIN DESCRIPTION - FREQUENCY: FREQUENCY: CONTINUOUS

## 2022-09-30 ASSESSMENT — PAIN DESCRIPTION - LOCATION: LOCATION: ARM

## 2022-09-30 NOTE — Clinical Note
Demetrio Dell was seen and treated in our emergency department on 9/30/2022. She may return to work on 10/02/2022. If you have any questions or concerns, please don't hesitate to call.       Miguel Arias MD

## 2022-09-30 NOTE — ED PROVIDER NOTES
HPI:  22,   Time: 1:32 PM EDT         Verna Payton is a 36 y.o. female presenting to the ED for 1 day history of right shoulder pain radiating into the right arm down to the hand, it is an aching type pain in the shoulder area, hurts to raise her arm, it is numbness and tingling down into the hand. She had an episode similar to this about 2 weeks ago, took a Medrol Dosepak and the symptoms did improve before this recurrence. 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 ROS:   Pertinent positives and negatives are stated within HPI, all other systems reviewed and are negative.  --------------------------------------------- PAST HISTORY ---------------------------------------------  Past Medical History:  has a past medical history of Arthritis and Hypertension. Past Surgical History:  has a past surgical history that includes Abdomen surgery; [place] tooth extraction;  section (,,,,); Upper gastrointestinal endoscopy (2016); Shannon-en-Y Gastric Bypass (2017); Upper gastrointestinal endoscopy (N/A, 10/9/2020); hiatal hernia repair (N/A, 2020); and Dilation and curettage of uterus (N/A, 2021). Social History:  reports that she has quit smoking. Her smoking use included cigarettes. She has never used smokeless tobacco. She reports that she does not drink alcohol and does not use drugs. Family History: family history includes Hearing Loss in her paternal cousin; Heart Disease in her mother. The patients home medications have been reviewed.     Allergies: Ibuprofen    -------------------------------------------------- RESULTS -------------------------------------------------  All laboratory and radiology results have been personally reviewed by myself   LABS:  No results found for this visit on 09/30/22. RADIOLOGY:  Interpreted by Radiologist.  XR CERVICAL SPINE (4-5 VIEWS)   Final Result   Loss of the normal lordosis and lower cervical spondylosis. Bony neural   foraminal narrowing at C6-7 on the left.             ------------------------- NURSING NOTES AND VITALS REVIEWED ---------------------------   The nursing notes within the ED encounter and vital signs as below have been reviewed. BP (!) 140/82   Pulse (!) 108   Temp 97.3 °F (36.3 °C) (Temporal)   Resp 16   Ht 5' 4\" (1.626 m)   Wt 230 lb (104.3 kg)   LMP 09/08/2022 (Exact Date)   SpO2 100%   BMI 39.48 kg/m²   Oxygen Saturation Interpretation: Normal      ---------------------------------------------------PHYSICAL EXAM--------------------------------------    Constitutional/General: Alert and oriented x3, well appearing, non toxic in NAD  Head: NC/AT  Eyes: PERRL, EOMI  Mouth: Oropharynx clear, handling secretions, no trismus  Neck: Supple, full ROM, no meningeal signs; There is tenderness palpation over the proximal trapezius muscles. Cervical spine is nontender. Pulmonary: Lungs clear to auscultation bilaterally, no wheezes, rales, or rhonchi. Not in respiratory distress  Cardiovascular:  Regular rate and rhythm, no murmurs, gallops, or rubs. 2+ distal pulses  Abdomen: Soft, non tender, non distended,   Extremities: Moves all extremities x 4. Warm and well perfused; There is tenderness palpation over the anterior and lateral aspect of the right shoulder joint. Range of motion decreased secondary to pain. Hand grasp is normal.  The right upper extremity is neurovascular intact.   Skin: warm and dry without rash  Neurologic: GCS 15,  Psych: Normal Affect      ------------------------------ ED COURSE/MEDICAL DECISION MAKING----------------------  Medications   methylPREDNISolone sodium (SOLU-MEDROL) injection 125 mg (125 mg IntraMUSCular Given 9/30/22 1350)         Medical Decision Making:    Simple; plain x-rays of cervical spine showed left-sided foraminal narrowing at C6-C7. There were mild degenerative changes of the vertebral in both the thoracic and cervical spine. Patient will be treated with anti-inflammatories and muscle relaxers. The anti-inflammatory will be prednisone. She has had a reaction in the distant past to ibuprofen but it resolved and some oral swelling. She is unsure whether she is taking any other anti-inflammatories such as Naprosyn, in the past.    Counseling: The emergency provider has spoken with the patient and discussed todays results, in addition to providing specific details for the plan of care and counseling regarding the diagnosis and prognosis. Questions are answered at this time and they are agreeable with the plan.      --------------------------------- IMPRESSION AND DISPOSITION ---------------------------------    IMPRESSION  1. Cervical radiculopathy    2.  Cervical arthritis        DISPOSITION  Disposition: Discharge to home  Patient condition is stable                 Venkata Young MD  09/30/22 9129

## 2022-10-10 ENCOUNTER — OFFICE VISIT (OUTPATIENT)
Dept: ORTHOPEDIC SURGERY | Age: 42
End: 2022-10-10
Payer: COMMERCIAL

## 2022-10-10 VITALS — WEIGHT: 230 LBS | TEMPERATURE: 98 F | BODY MASS INDEX: 39.27 KG/M2 | HEIGHT: 64 IN

## 2022-10-10 DIAGNOSIS — M25.511 ACUTE PAIN OF RIGHT SHOULDER: Primary | ICD-10-CM

## 2022-10-10 PROCEDURE — G8484 FLU IMMUNIZE NO ADMIN: HCPCS | Performed by: ORTHOPAEDIC SURGERY

## 2022-10-10 PROCEDURE — 1036F TOBACCO NON-USER: CPT | Performed by: ORTHOPAEDIC SURGERY

## 2022-10-10 PROCEDURE — 99213 OFFICE O/P EST LOW 20 MIN: CPT | Performed by: ORTHOPAEDIC SURGERY

## 2022-10-10 PROCEDURE — G8427 DOCREV CUR MEDS BY ELIG CLIN: HCPCS | Performed by: ORTHOPAEDIC SURGERY

## 2022-10-10 PROCEDURE — G8417 CALC BMI ABV UP PARAM F/U: HCPCS | Performed by: ORTHOPAEDIC SURGERY

## 2022-10-10 NOTE — PROGRESS NOTES
Jocelyne Padilla is a 36 y.o. female, who presents   Chief Complaint   Patient presents with    Shoulder Pain     Right shoulder pain over the last two weeks. Pain starts in the shoulder and travels down into hand. Most pain stays in the shoulder. Feels like a shocking pain and travels into arm. Went to ED and recieved some predisone and muscle relaxers. Helped for about one day. HPI[de-identified] Right shoulder pains been present for a few weeks. There is no history of injury or incident. Seems to be around the right shoulder area. Gracie Humphrey has been to urgent care at [facility] where she was evaluated including x-rays and was given a steroid shot and also some pills. She is not able to take oral ibuprofen because of an allergic reaction. Allergies; medications; past medical, surgical, family, and social history; and problem list have been reviewed today and updated as indicated in this encounter - see below following Ortho specifics. Musculoskeletal: There is tenderness around the right shoulder area. There seems to be no tenderness in the neck. Range of motion of the right shoulder is limited some in elevation and external rotation and also in cross body abduction. There is slight crepitus. No instability of AC or glenohumeral joints. Rotator cuff elements seem to be grossly intact. Radiologic Studies: Imaging of the cervical spine showed some degenerative changes at the C5-6 and 6 7 disc levels. There is slight flattening of the cervical lordosis. No other gross abnormalities noted. The shoulder was not imaged. ASSESSMENT:  Parris Mendez was seen today for shoulder pain.     Diagnoses and all orders for this visit:    Acute pain of right shoulder  -     External Referral To Physical Therapy     Treatment alternatives were reviewed including medical and physical therapies, injections, and surgical options, expected risks benefits and likely outcome of each were discussed in detail, questions asked and answered and understood. We discussed the symptom as well as physical findings and imaging results. Seems more like shoulder strain or perhaps impingement. PLAN: We discussed physical therapy and she is willing to try this. She can take some Tylenol but I would steer clear of oral anti-inflammatories because of her previous reaction. We will follow-up in about 3 weeks. Patient Active Problem List   Diagnosis    S/P gastric bypass    Gastroesophageal reflux disease without esophagitis    Marginal ulcer    Hiatal hernia    COVID       Past Medical History:   Diagnosis Date    Arthritis     Hypertension     during pregnancy       Past Surgical History:   Procedure Laterality Date    ABDOMEN SURGERY       SECTION  ,,,,    DILATION AND CURETTAGE OF UTERUS N/A 2021    IUD REMOVAL (ASHLY) (CPT 14682) performed by uYridia Hammond MD at [address] [place] [address] N/A 2020    LAPAROSCOPIC HIATAL HERNIA REPAIR WITH MESH, TRUNCAL VAGOTOMY performed by Ajit Hartman MD at [address] [facility]  2017    Laparascopic    UPPER GASTROINTESTINAL ENDOSCOPY  2016    Dr. Martin  N/A 10/9/2020    EGD BIOPSY performed by Ajit Hartman MD at [address]          Current Outpatient Medications   Medication Sig Dispense Refill    predniSONE (DELTASONE) 10 MG tablet Take 40 mg by mouth once daily for 3 days, then 20 mg by mouth once daily for 3 days, then 10 mg by mouth once daily for 3 days.  21 tablet 0    cyclobenzaprine (FLEXERIL) 10 MG tablet Take 1 tablet by mouth 3 times daily as needed for Muscle spasms 21 tablet 0    vitamin D (ERGOCALCIFEROL) 1.25 MG (91842 UT) CAPS capsule TAKE 1 CAPLET ORALLY TWICE PER WEEK FOR 28 DAYS      lisinopril (PRINIVIL;ZESTRIL) 10 MG tablet TAKE 1 TABLET BY MOUTH EVERY DAY      loratadine (CLARITIN) 10 MG tablet TAKE 1 TABLET BY MOUTH EVERY DAY AS NEEDED

## 2022-10-31 ENCOUNTER — OFFICE VISIT (OUTPATIENT)
Dept: ORTHOPEDIC SURGERY | Age: 42
End: 2022-10-31
Payer: COMMERCIAL

## 2022-10-31 VITALS — WEIGHT: 236 LBS | HEIGHT: 64 IN | TEMPERATURE: 98 F | BODY MASS INDEX: 40.29 KG/M2

## 2022-10-31 DIAGNOSIS — M25.511 ACUTE PAIN OF RIGHT SHOULDER: Primary | ICD-10-CM

## 2022-10-31 DIAGNOSIS — M75.21 TENDONITIS OF LONG HEAD OF BICEPS BRACHII OF RIGHT SHOULDER: ICD-10-CM

## 2022-10-31 DIAGNOSIS — M75.21 TENDONITIS OF LONG HEAD OF BICEPS BRACHII OF RIGHT SHOULDER: Primary | ICD-10-CM

## 2022-10-31 PROCEDURE — 20550 NJX 1 TENDON SHEATH/LIGAMENT: CPT | Performed by: NURSE PRACTITIONER

## 2022-10-31 PROCEDURE — 99999 PR OFFICE/OUTPT VISIT,PROCEDURE ONLY: CPT | Performed by: NURSE PRACTITIONER

## 2022-10-31 PROCEDURE — 99213 OFFICE O/P EST LOW 20 MIN: CPT | Performed by: ORTHOPAEDIC SURGERY

## 2022-10-31 PROCEDURE — G8427 DOCREV CUR MEDS BY ELIG CLIN: HCPCS | Performed by: ORTHOPAEDIC SURGERY

## 2022-10-31 PROCEDURE — G8417 CALC BMI ABV UP PARAM F/U: HCPCS | Performed by: ORTHOPAEDIC SURGERY

## 2022-10-31 PROCEDURE — G8484 FLU IMMUNIZE NO ADMIN: HCPCS | Performed by: ORTHOPAEDIC SURGERY

## 2022-10-31 PROCEDURE — 1036F TOBACCO NON-USER: CPT | Performed by: ORTHOPAEDIC SURGERY

## 2022-10-31 NOTE — PROGRESS NOTES
Chief Complaint:   Chief Complaint   Patient presents with    Shoulder Pain     F/u right shoulder after PT. Patient states no improvements. Ernesto Holland is up for right shoulder pain. This has been persistent. She has been going for therapy at [facility]. 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  She has pain around the deltoid area and in the front of the shoulder. She says that she has her kids pressure on her shoulder to decrease the discomfort. She has discomfort at night as well. The therapy has not helped her much. Allergies; medications; past medical, surgical, family, and social history; and problem list have been reviewed today and updated as indicated in this encounter seen below. Exam: Range of motion the right shoulder is very good. Before meals and glenohumeral joints are stable. There is no crepitus with motion. She has fair strength but does guard some because of the discomfort. Palpation around the shoulder area reveals some tenderness in the anterior aspect which seems to be in the long head bicipital area. Radiographs: None    ASSESSMENT:    Baltazar Cruz was seen today for shoulder pain. Diagnoses and all orders for this visit:    Acute pain of right shoulder    Tendonitis of long head of biceps brachii of right shoulder        PLAN: Veronica long head biceps tendon sheath injection with Awa. She was agreeable to this and I will have Maryjane Rene CNP inject her with ultrasound guidance. Return in about 3 weeks (around 11/21/2022). Current Outpatient Medications   Medication Sig Dispense Refill    predniSONE (DELTASONE) 10 MG tablet Take 40 mg by mouth once daily for 3 days, then 20 mg by mouth once daily for 3 days, then 10 mg by mouth once daily for 3 days.  21 tablet 0    vitamin D (ERGOCALCIFEROL) 1.25 MG (41170 UT) CAPS capsule TAKE 1 CAPLET ORALLY TWICE PER WEEK FOR 28 DAYS      lisinopril (PRINIVIL;ZESTRIL) 10 MG tablet TAKE 1 TABLET BY MOUTH EVERY DAY      loratadine (CLARITIN) 10 MG tablet TAKE 1 TABLET BY MOUTH EVERY DAY AS NEEDED      clotrimazole-betamethasone (LOTRISONE) 1-0.05 % cream APPLY TOPICALLY THREE TIMES DAILY      albuterol sulfate  (90 Base) MCG/ACT inhaler TAKE 1 PUFF INHALED 4 TIMES PER DAY TAKE AS NEEDED      Diclofenac Sodium  MG TB24 TAKE 1 TABLET, 1 TIME PER DAY. WITH FOOD  2    fluticasone (FLONASE) 50 MCG/ACT nasal spray as needed  6    Multiple Vitamins-Minerals (THERAPEUTIC MULTIVITAMIN-MINERALS) tablet Take 1 tablet by mouth 2 times daily Indications: bariatric advantage      acetaminophen (APAP EXTRA STRENGTH) 500 MG tablet Take 1 tablet by mouth every 6 hours as needed for Pain 40 tablet 0     No current facility-administered medications for this visit.        Patient Active Problem List   Diagnosis    S/P gastric bypass    Gastroesophageal reflux disease without esophagitis    Marginal ulcer    Hiatal hernia    COVID       Past Medical History:   Diagnosis Date    Arthritis     Hypertension     during pregnancy       Past Surgical History:   Procedure Laterality Date    ABDOMEN SURGERY       SECTION  ,,,,    DILATION AND CURETTAGE OF UTERUS N/A 2021    IUD REMOVAL (ASHLY) (CPT 03648) performed by Vanessa Lea MD at [address] [facility] N/A 2020    LAPAROSCOPIC HIATAL HERNIA REPAIR WITH MESH, TRUNCAL VAGOTOMY performed by Maria E Kingston MD at [address] [facility]  2017    Laparascopic    UPPER GASTROINTESTINAL ENDOSCOPY  2016    Dr. Merle Darling N/A 10/9/2020    EGD BIOPSY performed by Maria E Kingston MD at [address] [place] [address]         Allergies   Allergen Reactions    Ibuprofen Swelling       Social History     Socioeconomic History    Marital status: Single     Spouse name: None    Number of children: None Years of education: None    Highest education level: None   Tobacco Use    Smoking status: Former     Types: Cigarettes    Smokeless tobacco: Never   Vaping Use    Vaping Use: Never used   Substance and Sexual Activity    Alcohol use: No    Drug use: No    Sexual activity: Yes     Partners: Male       Review of Systems  As follows except as previously noted in HPI:  Constitutional: Negative for chills, diaphoresis, fatigue, fever and unexpected weight change. Respiratory: Negative for cough, shortness of breath and wheezing. Cardiovascular: Negative for chest pain and palpitations. Neurological: Negative for dizziness, syncope, cephalgia. GI / : negative  Musculoskeletal: see HPI       Objective:   Physical Exam   Constitutional: Oriented to person, place, and time. and appears well-developed and well-nourished. :   Head: Normocephalic and atraumatic. Eyes: EOM are normal.   Neck: Neck supple. Cardiovascular: Normal rate and regular rhythm. Pulmonary/Chest: Effort normal. No stridor. No respiratory distress, no wheezes. Abdominal:  No abnormal distension. Neurological: Alert and oriented to person, place, and time. Skin: Skin is warm and dry. Psychiatric: Normal mood and affect.  Behavior is normal. Thought content normal.    CELESTINO Handy DO    10/31/22  12:06 PM

## 2022-11-03 RX ORDER — TRIAMCINOLONE ACETONIDE 40 MG/ML
40 INJECTION, SUSPENSION INTRA-ARTICULAR; INTRAMUSCULAR ONCE
Status: COMPLETED | OUTPATIENT
Start: 2022-11-03 | End: 2022-11-03

## 2022-11-03 RX ADMIN — TRIAMCINOLONE ACETONIDE 40 MG: 40 INJECTION, SUSPENSION INTRA-ARTICULAR; INTRAMUSCULAR at 13:53

## 2022-11-03 NOTE — PROGRESS NOTES
Chief Complaint   Patient presents with    Injections     Right shoulder bicep tendon injection     Verbal and written consent was obtained for the injection. I will proceed with a cortisone injection in the Right shoulder using ultrasound guidance for accuracy of injection. A [employer] ultrasound unit with a variable frequency linear transducer was used for this procedure. Ethyl chloride vapocoolant spray was applied. The ultrasound unit was used to identify the bicep tendon. The skin was preped with alcohol, and using ultrasound guidance and a no touch, aseptic technique, a 25 gauge, 1.5\" needle was inserted, confirmation of needle placement was noted on the ultrasound unit. 1 ml of Kenalog 40mg and 1 ml of 0.25% Marcaine was injected into the anterior aspect into the bicep tendon of the Right shoulder. The patient tolerated the injections well. I will see the patient back in 2-3 weeks with dr Renuka Valenzuela. Images are stored and uploaded into the [employer] [facility]. Diagnosis Orders   1.  Tendonitis of long head of biceps brachii of right shoulder  AR INJECT TENDON SHEATH/LIGAMENT

## 2022-11-07 ENCOUNTER — TELEPHONE (OUTPATIENT)
Dept: BARIATRICS/WEIGHT MGMT | Age: 42
End: 2022-11-07

## 2022-11-07 NOTE — TELEPHONE ENCOUNTER
Patient is due for their annual follow up and needs scheduled. I called the patient and left a detailed voicemail to call our office back.

## 2022-11-10 ENCOUNTER — HOSPITAL ENCOUNTER (EMERGENCY)
Age: 42
Discharge: LEFT BEFORE DISCHARGE | End: 2022-11-10
Payer: COMMERCIAL

## 2022-11-10 PROCEDURE — 99281 EMR DPT VST MAYX REQ PHY/QHP: CPT

## 2022-12-14 ENCOUNTER — TELEPHONE (OUTPATIENT)
Dept: BARIATRICS/WEIGHT MGMT | Age: 42
End: 2022-12-14

## 2022-12-30 ENCOUNTER — HOSPITAL ENCOUNTER (EMERGENCY)
Age: 42
Discharge: HOME | End: 2022-12-30
Attending: EMERGENCY MEDICINE
Payer: COMMERCIAL

## 2022-12-30 VITALS
RESPIRATION RATE: 18 BRPM | HEART RATE: 85 BPM | WEIGHT: 228 LBS | SYSTOLIC BLOOD PRESSURE: 135 MMHG | OXYGEN SATURATION: 100 % | TEMPERATURE: 97.3 F | HEIGHT: 64 IN | DIASTOLIC BLOOD PRESSURE: 87 MMHG | BODY MASS INDEX: 38.93 KG/M2

## 2022-12-30 DIAGNOSIS — U07.1 COVID-19: Primary | ICD-10-CM

## 2022-12-30 LAB
INFLUENZA A BY PCR: NOT DETECTED
INFLUENZA B BY PCR: NOT DETECTED
SARS-COV-2, NAAT: DETECTED

## 2022-12-30 PROCEDURE — 87502 INFLUENZA DNA AMP PROBE: CPT

## 2022-12-30 PROCEDURE — 99283 EMERGENCY DEPT VISIT LOW MDM: CPT

## 2022-12-30 PROCEDURE — 87635 SARS-COV-2 COVID-19 AMP PRB: CPT

## 2022-12-30 RX ORDER — DEXAMETHASONE 6 MG/1
6 TABLET ORAL
Qty: 7 TABLET | Refills: 0 | Status: SHIPPED | OUTPATIENT
Start: 2022-12-30 | End: 2023-01-06

## 2022-12-30 RX ORDER — DEXTROMETHORPHAN HYDROBROMIDE AND PROMETHAZINE HYDROCHLORIDE 15; 6.25 MG/5ML; MG/5ML
5 SYRUP ORAL 4 TIMES DAILY PRN
Qty: 120 ML | Refills: 0 | Status: SHIPPED | OUTPATIENT
Start: 2022-12-30 | End: 2023-01-06

## 2022-12-30 ASSESSMENT — ENCOUNTER SYMPTOMS
BLOOD IN STOOL: 0
COUGH: 1
RHINORRHEA: 1
VOMITING: 0
EYE DISCHARGE: 0
SINUS PRESSURE: 0
SORE THROAT: 0
BACK PAIN: 0
ABDOMINAL PAIN: 0
ABDOMINAL DISTENTION: 0
EYE PAIN: 0
NAUSEA: 0
EYE REDNESS: 0
SHORTNESS OF BREATH: 0
DIARRHEA: 0
WHEEZING: 0

## 2022-12-30 ASSESSMENT — PAIN SCALES - GENERAL: PAINLEVEL_OUTOF10: 10

## 2022-12-30 ASSESSMENT — PAIN DESCRIPTION - PAIN TYPE: TYPE: ACUTE PAIN

## 2022-12-30 ASSESSMENT — PAIN DESCRIPTION - DESCRIPTORS: DESCRIPTORS: ACHING

## 2022-12-30 ASSESSMENT — PAIN DESCRIPTION - ONSET: ONSET: ON-GOING

## 2022-12-30 ASSESSMENT — PAIN - FUNCTIONAL ASSESSMENT: PAIN_FUNCTIONAL_ASSESSMENT: 0-10

## 2022-12-30 ASSESSMENT — PAIN DESCRIPTION - LOCATION: LOCATION: GENERALIZED

## 2022-12-30 ASSESSMENT — PAIN DESCRIPTION - FREQUENCY: FREQUENCY: CONTINUOUS

## 2022-12-30 NOTE — Clinical Note
Kristen Savage was seen and treated in our emergency department on 12/30/2022. COVID19 virus is suspected. Per the CDC guidelines we recommend home isolation until the following conditions are all met:    1. At least five days have passed since symptoms first appeared and/or had a close exposure,   2. After home isolation for five days, wearing a mask around others for the next five days,  3. At least 24 have passed since last fever without the use of fever-reducing medications and  4. Symptoms (eg cough, shortness of breath) have improved    If you have any questions or concerns, please don't hesitate to call.     She may return to work/school on 01/02/2023    Patient may return to work wearing mask from 1- till 1-    Anthony Medina MD

## 2022-12-30 NOTE — ED PROVIDER NOTES
MOTHER TESTED POSITIVE FOR COVID    The history is provided by the patient. URI  Presenting symptoms: congestion, cough, fatigue and rhinorrhea    Presenting symptoms: no ear pain, no facial pain, no fever and no sore throat    Severity:  Moderate  Onset quality:  Sudden  Duration:  2 days  Chronicity:  New  Associated symptoms: no arthralgias, no headaches and no wheezing       Review of Systems   Constitutional:  Positive for fatigue. Negative for chills and fever. HENT:  Positive for congestion and rhinorrhea. Negative for ear pain, sinus pressure and sore throat. Eyes:  Negative for pain, discharge and redness. Respiratory:  Positive for cough. Negative for shortness of breath and wheezing. Cardiovascular:  Negative for chest pain. Gastrointestinal:  Negative for abdominal distention, abdominal pain, blood in stool, diarrhea, nausea and vomiting. Genitourinary:  Negative for dysuria and frequency. Musculoskeletal:  Negative for arthralgias and back pain. Skin:  Negative for rash and wound. Neurological:  Negative for weakness and headaches. Hematological:  Negative for adenopathy. All other systems reviewed and are negative. Physical Exam  Vitals and nursing note reviewed. Constitutional:       Appearance: She is well-developed. HENT:      Head: Normocephalic and atraumatic. Right Ear: Hearing and external ear normal. Tympanic membrane is retracted. Left Ear: Hearing and external ear normal. Tympanic membrane is retracted. Nose: Mucosal edema and congestion present. Mouth/Throat:      Pharynx: Uvula midline. Eyes:      General: Lids are normal.      Conjunctiva/sclera: Conjunctivae normal.      Pupils: Pupils are equal, round, and reactive to light. Cardiovascular:      Rate and Rhythm: Normal rate and regular rhythm. Heart sounds: Normal heart sounds. No murmur heard. Pulmonary:      Effort: Pulmonary effort is normal. No respiratory distress. Breath sounds: Normal breath sounds. No wheezing or rales. Abdominal:      General: Bowel sounds are normal.      Palpations: Abdomen is soft. Abdomen is not rigid. Tenderness: There is no abdominal tenderness. There is no guarding or rebound. Musculoskeletal:      Cervical back: Normal range of motion and neck supple. Skin:     General: Skin is warm and dry. Findings: No abrasion or rash. Neurological:      Mental Status: She is alert and oriented to person, place, and time. GCS: GCS eye subscore is 4. GCS verbal subscore is 5. GCS motor subscore is 6. Cranial Nerves: No cranial nerve deficit. Sensory: No sensory deficit. Coordination: Coordination normal.      Gait: Gait normal.        Procedures     MDM          --------------------------------------------- PAST HISTORY ---------------------------------------------  Past Medical History:  has a past medical history of Arthritis and Hypertension. Past Surgical History:  has a past surgical history that includes Abdomen surgery; [place] tooth extraction;  section (,,,,); Upper gastrointestinal endoscopy (2016); Shannon-en-Y Gastric Bypass (2017); Upper gastrointestinal endoscopy (N/A, 10/9/2020); hiatal hernia repair (N/A, 2020); and Dilation and curettage of uterus (N/A, 2021). Social History:  reports that she has quit smoking. Her smoking use included cigarettes. She has never used smokeless tobacco. She reports that she does not drink alcohol and does not use drugs. Family History: family history includes Hearing Loss in her paternal cousin; Heart Disease in her mother. The patients home medications have been reviewed.     Allergies: Ibuprofen    -------------------------------------------------- RESULTS -------------------------------------------------  Labs:  Results for orders placed or performed during the hospital encounter of 22   RAPID INFLUENZA A/B ANTIGENS    Specimen: Nasopharyngeal   Result Value Ref Range    Influenza A by PCR Not Detected Not Detected    Influenza B by PCR Not Detected Not Detected   COVID-19, Rapid    Specimen: Nasopharyngeal Swab   Result Value Ref Range    SARS-CoV-2, NAAT DETECTED (A) Not Detected       Radiology:  No orders to display       ------------------------- NURSING NOTES AND VITALS REVIEWED ---------------------------  Date / Time Roomed:  12/30/2022  8:51 AM  ED Bed Assignment:  03/03    The nursing notes within the ED encounter and vital signs as below have been reviewed. /87   Pulse 85   Temp 97.3 °F (36.3 °C) (Temporal)   Resp 18   Ht 5' 4\" (1.626 m)   Wt 228 lb (103.4 kg)   LMP 12/09/2022   SpO2 100%   BMI 39.14 kg/m²   Oxygen Saturation Interpretation: Normal      ------------------------------------------ PROGRESS NOTES ------------------------------------------  I have spoken with the patient and discussed todays results, in addition to providing specific details for the plan of care and counseling regarding the diagnosis and prognosis. Their questions are answered at this time and they are agreeable with the plan. I discussed at length with them reasons for immediate return here for re evaluation. They will followup with primary care by calling their office tomorrow. Medications - No data to display      --------------------------------- ADDITIONAL PROVIDER NOTES ---------------------------------  At this time the patient is without objective evidence of an acute process requiring hospitalization or inpatient management. They have remained hemodynamically stable throughout their entire ED visit and are stable for discharge with outpatient follow-up. The plan has been discussed in detail and they are aware of the specific conditions for emergent return, as well as the importance of follow-up.       New Prescriptions    DEXAMETHASONE (DECADRON) 6 MG TABLET    Take 1 tablet by mouth daily (with breakfast) for 7 days    PROMETHAZINE-DEXTROMETHORPHAN (PROMETHAZINE-DM) 6.25-15 MG/5ML SYRUP    Take 5 mLs by mouth 4 times daily as needed for Cough       Diagnosis:  1. COVID-19        Disposition:  Patient's disposition: Discharge to home  Patient's condition is stable.                     Kwabena Jackson MD  12/30/22 [id number]

## 2023-02-07 DIAGNOSIS — M17.0 ARTHRITIS OF BOTH KNEES: Primary | ICD-10-CM

## 2023-02-08 ENCOUNTER — OFFICE VISIT (OUTPATIENT)
Dept: ORTHOPEDIC SURGERY | Age: 43
End: 2023-02-08

## 2023-02-08 VITALS — WEIGHT: 228 LBS | BODY MASS INDEX: 38.93 KG/M2 | TEMPERATURE: 98 F | HEIGHT: 64 IN

## 2023-02-08 DIAGNOSIS — M25.562 CHRONIC PAIN OF BOTH KNEES: Primary | ICD-10-CM

## 2023-02-08 DIAGNOSIS — M17.0 ARTHRITIS OF BOTH KNEES: ICD-10-CM

## 2023-02-08 DIAGNOSIS — M25.561 CHRONIC PAIN OF BOTH KNEES: Primary | ICD-10-CM

## 2023-02-08 DIAGNOSIS — G89.29 CHRONIC PAIN OF BOTH KNEES: Primary | ICD-10-CM

## 2023-02-08 RX ORDER — TRIAMCINOLONE ACETONIDE 40 MG/ML
40 INJECTION, SUSPENSION INTRA-ARTICULAR; INTRAMUSCULAR ONCE
Status: COMPLETED | OUTPATIENT
Start: 2023-02-08 | End: 2023-02-08

## 2023-02-08 RX ADMIN — TRIAMCINOLONE ACETONIDE 40 MG: 40 INJECTION, SUSPENSION INTRA-ARTICULAR; INTRAMUSCULAR at 12:19

## 2023-02-08 NOTE — PROGRESS NOTES
Chief Complaint:   Chief Complaint   Patient presents with    Knee Pain     Bilateral Knee F/U, R>L       Awa Voss complains of pain in both knees. She has had this for some time. There is been no history of specific injury. She indicates most of the discomfort is anterior. It is associated with changes in activity. It seems to bother her more when she is resting and when she is busy. He had no particular treatment to this or evaluation. Allergies; medications; past medical, surgical, family, and social history; and problem list have been reviewed today and updated as indicated in this encounter seen below. Exam: Smooth and balanced at a slow pace. She is able to single-leg stand on each side without assistance. Range of motion of both knees is 0 to 110 degrees or more flexion. There is significant crepitus when in both knees with the right being a little worse than the left on a differential basis. The crepitus seems to be in the lateral compartment and patellofemoral.  There is no gross effusion in the knees are not hyperemic. Cruciate and collateral ligaments are stable. There is only slight medial and lateral gapping which is probably physiologic for her. Meniscal exam is not frankly positive for tear. Radiographs: Imaging both knees with AP weightbearing shows minimal joint space narrowing medial or lateral.  The right knee shows a slight hypertrophic changes in the tibiofemoral articulation. General alignment is good    ASSESSMENT:    Ashley Garrison was seen today for knee pain.     Diagnoses and all orders for this visit:    Chronic pain of both knees  -     TX ARTHROCENTESIS ASPIR&/INJ MAJOR JT/BURSA W/O US  -     [place] ARTHROCENTESIS ASPIR&/INJ MAJOR JT/BURSA W/O US    Arthritis of both knees  -     [place] ARTHROCENTESIS ASPIR&/INJ MAJOR JT/BURSA W/O US  -     [place] ARTHROCENTESIS ASPIR&/INJ MAJOR JT/BURSA W/O US    Other orders  -     triamcinolone acetonide (KENALOG-40) injection 40 mg  - triamcinolone acetonide (KENALOG-40) injection 40 mg        PLAN: We discussed the symptoms as well as physical findings and imaging results. 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  Seems to be worse in the patellofemoral compartments. We discussed treatment options and Latoni. A would like to try injections    Injectionof the left knee with  4 cc    1/4  % bupivicaine  and Kenalog   (triamcinalone)         40mgwas discussed with the patient. Discussion included but was not limited to potential risks and benefits. No contraindications to the procedure were noted. Understanding and agreement was indicated. The procedure was accomplished without incident using appropriate aseptic technique. follow up as needed    Injectionof the right knee with  4 cc    1/4  % bupivicaine  and Kenalog   (triamcinalone)         40mgwas discussed with the patient. Discussion included but was not limited to potential risks and benefits. No contraindications to the procedure were noted. Understanding and agreement was indicated. The procedure was accomplished without incident using appropriate aseptic technique. Return if symptoms worsen or fail to improve. Current Outpatient Medications   Medication Sig Dispense Refill    vitamin D (ERGOCALCIFEROL) 1.25 MG (91374 UT) CAPS capsule TAKE 1 CAPLET ORALLY TWICE PER WEEK FOR 28 DAYS      lisinopril (PRINIVIL;ZESTRIL) 10 MG tablet TAKE 1 TABLET BY MOUTH EVERY DAY      loratadine (CLARITIN) 10 MG tablet TAKE 1 TABLET BY MOUTH EVERY DAY AS NEEDED      clotrimazole-betamethasone (LOTRISONE) 1-0.05 % cream APPLY TOPICALLY THREE TIMES DAILY      albuterol sulfate  (90 Base) MCG/ACT inhaler TAKE 1 PUFF INHALED 4 TIMES PER DAY TAKE AS NEEDED      Diclofenac Sodium  MG TB24 TAKE 1 TABLET, 1 TIME PER DAY.  WITH FOOD  2    fluticasone (FLONASE) 50 MCG/ACT nasal spray as needed  6    Multiple Vitamins-Minerals (THERAPEUTIC MULTIVITAMIN-MINERALS) tablet Take 1 tablet by mouth 2 times daily Indications: bariatric advantage      acetaminophen (APAP EXTRA STRENGTH) 500 MG tablet Take 1 tablet by mouth every 6 hours as needed for Pain 40 tablet 0     No current facility-administered medications for this visit. Patient Active Problem List   Diagnosis    S/P gastric bypass    Gastroesophageal reflux disease without esophagitis    Marginal ulcer    Hiatal hernia    COVID       Past Medical History:   Diagnosis Date    Arthritis     Hypertension     during pregnancy       Past Surgical History:   Procedure Laterality Date    ABDOMEN SURGERY       SECTION  ,,,,    DILATION AND CURETTAGE OF UTERUS N/A 2021    IUD REMOVAL (Jumaus Irajmegan) (CPT 73228) performed by Felicitas Mercado MD at [facility] N/A 2020    LAPAROSCOPIC HIATAL HERNIA REPAIR WITH MESH, TRUNCAL VAGOTOMY performed by Edna Medley MD at [facility]  2017    Laparascopic    UPPER GASTROINTESTINAL ENDOSCOPY  2016    Dr. Johana Barnes N/A 10/9/2020    EGD BIOPSY performed by Edna Medley MD at [facility] EXTRACTION         Allergies   Allergen Reactions    Ibuprofen Swelling       Social History     Socioeconomic History    Marital status: Single     Spouse name: None    Number of children: None    Years of education: None    Highest education level: None   Tobacco Use    Smoking status: Former     Types: Cigarettes    Smokeless tobacco: Never   Vaping Use    Vaping Use: Never used   Substance and Sexual Activity    Alcohol use: No    Drug use: No    Sexual activity: Yes     Partners: Male       Review of Systems  As follows except as previously noted in HPI:  Constitutional: Negative for chills, diaphoresis, fatigue, fever and unexpected weight change. Respiratory: Negative for cough, shortness of breath and wheezing.     Cardiovascular: Negative for chest pain and palpitations. Neurological: Negative for dizziness, syncope, cephalgia. GI / : negative  Musculoskeletal: see HPI       Objective:   Physical Exam   Constitutional: Oriented to person, place, and time. and appears well-developed and well-nourished. :   Head: Normocephalic and atraumatic. Eyes: EOM are normal.   Neck: Neck supple. Cardiovascular: Normal rate and regular rhythm. Pulmonary/Chest: Effort normal. No stridor. No respiratory distress, no wheezes. Abdominal:  No abnormal distension. Neurological: Alert and oriented to person, place, and time. Skin: Skin is warm and dry. Psychiatric: Normal mood and affect.  Behavior is normal. Thought content normal.    CELESTINO Can DO    2/8/23  12:20 PM

## 2023-03-07 ENCOUNTER — APPOINTMENT (OUTPATIENT)
Dept: GENERAL RADIOLOGY | Age: 43
End: 2023-03-07
Payer: COMMERCIAL

## 2023-03-07 ENCOUNTER — HOSPITAL ENCOUNTER (EMERGENCY)
Age: 43
Discharge: HOME | End: 2023-03-07
Attending: FAMILY MEDICINE
Payer: COMMERCIAL

## 2023-03-07 VITALS
TEMPERATURE: 97 F | DIASTOLIC BLOOD PRESSURE: 87 MMHG | HEIGHT: 64 IN | OXYGEN SATURATION: 99 % | WEIGHT: 225 LBS | BODY MASS INDEX: 38.41 KG/M2 | RESPIRATION RATE: 16 BRPM | SYSTOLIC BLOOD PRESSURE: 154 MMHG | HEART RATE: 100 BPM

## 2023-03-07 DIAGNOSIS — S70.01XA CONTUSION OF RIGHT HIP, INITIAL ENCOUNTER: Primary | ICD-10-CM

## 2023-03-07 DIAGNOSIS — S90.31XA CONTUSION OF RIGHT FOOT, INITIAL ENCOUNTER: ICD-10-CM

## 2023-03-07 DIAGNOSIS — S80.01XA CONTUSION OF RIGHT KNEE, INITIAL ENCOUNTER: ICD-10-CM

## 2023-03-07 DIAGNOSIS — S90.01XA CONTUSION OF RIGHT ANKLE, INITIAL ENCOUNTER: ICD-10-CM
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
## 2023-03-07 PROCEDURE — 73502 X-RAY EXAM HIP UNI 2-3 VIEWS: CPT

## 2023-03-07 PROCEDURE — 73610 X-RAY EXAM OF ANKLE: CPT

## 2023-03-07 PROCEDURE — 73630 X-RAY EXAM OF FOOT: CPT

## 2023-03-07 PROCEDURE — 73560 X-RAY EXAM OF KNEE 1 OR 2: CPT

## 2023-03-07 PROCEDURE — 99283 EMERGENCY DEPT VISIT LOW MDM: CPT

## 2023-03-07 RX ORDER — CYCLOBENZAPRINE HCL 10 MG
10 TABLET ORAL 3 TIMES DAILY PRN
Qty: 21 TABLET | Refills: 0 | Status: SHIPPED | OUTPATIENT
Start: 2023-03-07 | End: 2023-03-17

## 2023-03-07 RX ORDER — ACETAMINOPHEN 500 MG
500 TABLET ORAL 4 TIMES DAILY PRN
Qty: 30 TABLET | Refills: 0 | Status: SHIPPED | OUTPATIENT
Start: 2023-03-07

## 2023-03-07 ASSESSMENT — PAIN SCALES - GENERAL: PAINLEVEL_OUTOF10: 10

## 2023-03-07 ASSESSMENT — PAIN DESCRIPTION - LOCATION: LOCATION: LEG

## 2023-03-07 ASSESSMENT — PAIN DESCRIPTION - ONSET: ONSET: SUDDEN

## 2023-03-07 ASSESSMENT — PAIN DESCRIPTION - PAIN TYPE: TYPE: ACUTE PAIN

## 2023-03-07 ASSESSMENT — PAIN DESCRIPTION - DESCRIPTORS: DESCRIPTORS: THROBBING;STABBING

## 2023-03-07 ASSESSMENT — PAIN DESCRIPTION - ORIENTATION: ORIENTATION: RIGHT

## 2023-03-07 ASSESSMENT — PAIN DESCRIPTION - FREQUENCY: FREQUENCY: CONTINUOUS

## 2023-03-07 ASSESSMENT — PAIN - FUNCTIONAL ASSESSMENT: PAIN_FUNCTIONAL_ASSESSMENT: 0-10
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
## 2023-03-15 ENCOUNTER — OFFICE VISIT (OUTPATIENT)
Dept: ORTHOPEDIC SURGERY | Age: 43
End: 2023-03-15
Payer: COMMERCIAL

## 2023-03-15 VITALS — WEIGHT: 225 LBS | TEMPERATURE: 98 F | HEIGHT: 64 IN | BODY MASS INDEX: 38.41 KG/M2

## 2023-03-15 DIAGNOSIS — S93.601A SPRAIN OF RIGHT FOOT, INITIAL ENCOUNTER: Primary | ICD-10-CM

## 2023-03-15 PROCEDURE — 99213 OFFICE O/P EST LOW 20 MIN: CPT | Performed by: ORTHOPAEDIC SURGERY

## 2023-03-15 PROCEDURE — 1036F TOBACCO NON-USER: CPT | Performed by: ORTHOPAEDIC SURGERY

## 2023-03-15 PROCEDURE — G8484 FLU IMMUNIZE NO ADMIN: HCPCS | Performed by: ORTHOPAEDIC SURGERY

## 2023-03-15 PROCEDURE — G8427 DOCREV CUR MEDS BY ELIG CLIN: HCPCS | Performed by: ORTHOPAEDIC SURGERY

## 2023-03-15 PROCEDURE — G8417 CALC BMI ABV UP PARAM F/U: HCPCS | Performed by: ORTHOPAEDIC SURGERY
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
## 2023-04-26 DIAGNOSIS — K21.9 GASTROESOPHAGEAL REFLUX DISEASE WITHOUT ESOPHAGITIS: ICD-10-CM

## 2023-04-26 DIAGNOSIS — K91.2 MALNUTRITION FOLLOWING GASTROINTESTINAL SURGERY: Primary | ICD-10-CM

## 2023-05-06 ENCOUNTER — HOSPITAL ENCOUNTER (EMERGENCY)
Age: 43
Discharge: HOME | End: 2023-05-06
Attending: STUDENT IN AN ORGANIZED HEALTH CARE EDUCATION/TRAINING PROGRAM
Payer: COMMERCIAL

## 2023-05-06 VITALS
TEMPERATURE: 97.1 F | HEIGHT: 65 IN | RESPIRATION RATE: 16 BRPM | SYSTOLIC BLOOD PRESSURE: 159 MMHG | OXYGEN SATURATION: 100 % | DIASTOLIC BLOOD PRESSURE: 90 MMHG | BODY MASS INDEX: 36.65 KG/M2 | WEIGHT: 220 LBS | HEART RATE: 93 BPM

## 2023-05-06 DIAGNOSIS — R09.81 NASAL CONGESTION: ICD-10-CM

## 2023-05-06 DIAGNOSIS — R05.1 ACUTE COUGH: Primary | ICD-10-CM

## 2023-05-06 PROCEDURE — 96372 THER/PROPH/DIAG INJ SC/IM: CPT

## 2023-05-06 PROCEDURE — 6360000002 HC RX W HCPCS: Performed by: STUDENT IN AN ORGANIZED HEALTH CARE EDUCATION/TRAINING PROGRAM

## 2023-05-06 PROCEDURE — 99284 EMERGENCY DEPT VISIT MOD MDM: CPT

## 2023-05-06 RX ORDER — DEXAMETHASONE SODIUM PHOSPHATE 10 MG/ML
8 INJECTION, SOLUTION INTRAMUSCULAR; INTRAVENOUS ONCE
Status: COMPLETED | OUTPATIENT
Start: 2023-05-06 | End: 2023-05-06

## 2023-05-06 RX ADMIN — DEXAMETHASONE SODIUM PHOSPHATE 8 MG: 10 INJECTION, SOLUTION INTRAMUSCULAR; INTRAVENOUS at 14:18

## 2023-05-06 ASSESSMENT — ENCOUNTER SYMPTOMS
COUGH: 1
VOMITING: 0
SHORTNESS OF BREATH: 0
NAUSEA: 0

## 2023-05-06 NOTE — ED PROVIDER NOTES
evidence of an acute process requiring hospitalization or inpatient management. They have remained hemodynamically stable throughout their entire ED visit and are stable for discharge with outpatient follow-up. The plan has been discussed in detail and they are aware of the specific conditions for emergent return, as well as the importance of follow-up. New Prescriptions    No medications on file       Diagnosis:  1. Acute cough    2. Nasal congestion        Disposition:  Patient's disposition: Discharge to home  Patient's condition is stable. Hugh Rossi D.O. Emergency Medicine      5/6/2023 2:13 PM      NOTE: This report was transcribed using voice recognition software.  Every effort was made to ensure accuracy; however, inadvertent computerized transcription errors may be present              Hugh Rossi DO  Resident  05/06/23 2064

## 2023-05-17 ENCOUNTER — TELEPHONE (OUTPATIENT)
Dept: BARIATRICS/WEIGHT MGMT | Age: 43
End: 2023-05-17

## 2023-05-17 NOTE — TELEPHONE ENCOUNTER
Shannon Barron had an appt here today with Dr Cecilia Herrera that she missed. I called and LM for her to call back and we would be happy to reschedule.

## 2023-05-22 ENCOUNTER — HOSPITAL ENCOUNTER (OUTPATIENT)
Age: 43
Discharge: HOME | End: 2023-05-22
Payer: COMMERCIAL

## 2023-05-22 DIAGNOSIS — K91.2 MALNUTRITION FOLLOWING GASTROINTESTINAL SURGERY: ICD-10-CM

## 2023-05-22 DIAGNOSIS — K21.9 GASTROESOPHAGEAL REFLUX DISEASE WITHOUT ESOPHAGITIS: ICD-10-CM

## 2023-05-22 LAB
ALBUMIN SERPL-MCNC: 3.8 G/DL (ref 3.5–5.2)
ALP SERPL-CCNC: 102 U/L (ref 35–104)
ALT SERPL-CCNC: 16 U/L (ref 0–32)
ANION GAP SERPL CALCULATED.3IONS-SCNC: 10 MMOL/L (ref 7–16)
AST SERPL-CCNC: 21 U/L (ref 0–31)
BILIRUB SERPL-MCNC: 0.3 MG/DL (ref 0–1.2)
BUN SERPL-MCNC: 9 MG/DL (ref 6–20)
CALCIUM SERPL-MCNC: 8.3 MG/DL (ref 8.6–10.2)
CHLORIDE SERPL-SCNC: 110 MMOL/L (ref 98–107)
CHOLESTEROL, FASTING: 138 MG/DL (ref 0–199)
CO2 SERPL-SCNC: 19 MMOL/L (ref 22–29)
CREAT SERPL-MCNC: 0.7 MG/DL (ref 0.5–1)
ERYTHROCYTE [DISTWIDTH] IN BLOOD BY AUTOMATED COUNT: 20.1 FL (ref 11.5–15)
FERRITIN SERPL-MCNC: 7 NG/ML
FOLATE SERPL-MCNC: 20 NG/ML (ref 4.8–24.2)
GLUCOSE SERPL-MCNC: 73 MG/DL (ref 74–99)
HCT VFR BLD AUTO: 23.5 % (ref 34–48)
HDLC SERPL-MCNC: 66 MG/DL
HGB BLD-MCNC: 6.9 G/DL (ref 11.5–15.5)
LDL CHOLESTEROL CALCULATED: 60 MG/DL (ref 0–99)
MCH RBC QN AUTO: 18.5 PG (ref 26–35)
MCHC RBC AUTO-ENTMCNC: 29.4 % (ref 32–34.5)
MCV RBC AUTO: 63 FL (ref 80–99.9)
PLATELET # BLD AUTO: 307 E9/L (ref 130–450)
PMV BLD AUTO: 9.7 FL (ref 7–12)
POTASSIUM SERPL-SCNC: 3.4 MMOL/L (ref 3.5–5)
PREALB SERPL-MCNC: 27 MG/DL (ref 20–40)
PROT SERPL-MCNC: 6.7 G/DL (ref 6.4–8.3)
RBC # BLD AUTO: 3.73 E12/L (ref 3.5–5.5)
SODIUM SERPL-SCNC: 139 MMOL/L (ref 132–146)
TRIGLYCERIDE, FASTING: 60 MG/DL (ref 0–149)
VIT B12 SERPL-MCNC: 190 PG/ML (ref 211–946)
VITAMIN D 25-HYDROXY: 21 NG/ML (ref 30–100)
VLDLC SERPL CALC-MCNC: 12 MG/DL
WBC # BLD: 5.1 E9/L (ref 4.5–11.5)

## 2023-05-22 PROCEDURE — 82607 VITAMIN B-12: CPT

## 2023-05-22 PROCEDURE — 82746 ASSAY OF FOLIC ACID SERUM: CPT

## 2023-05-22 PROCEDURE — 36415 COLL VENOUS BLD VENIPUNCTURE: CPT

## 2023-05-22 PROCEDURE — 80061 LIPID PANEL: CPT

## 2023-05-22 PROCEDURE — 84255 ASSAY OF SELENIUM: CPT

## 2023-05-22 PROCEDURE — 84134 ASSAY OF PREALBUMIN: CPT

## 2023-05-22 PROCEDURE — 80053 COMPREHEN METABOLIC PANEL: CPT

## 2023-05-22 PROCEDURE — 84425 ASSAY OF VITAMIN B-1: CPT

## 2023-05-22 PROCEDURE — 82525 ASSAY OF COPPER: CPT

## 2023-05-22 PROCEDURE — 85027 COMPLETE CBC AUTOMATED: CPT

## 2023-05-22 PROCEDURE — 84590 ASSAY OF VITAMIN A: CPT

## 2023-05-22 PROCEDURE — 82306 VITAMIN D 25 HYDROXY: CPT

## 2023-05-22 PROCEDURE — 84630 ASSAY OF ZINC: CPT

## 2023-05-22 PROCEDURE — 82728 ASSAY OF FERRITIN: CPT

## 2023-05-23 ENCOUNTER — TELEPHONE (OUTPATIENT)
Dept: BARIATRICS/WEIGHT MGMT | Age: 43
End: 2023-05-23

## 2023-05-23 NOTE — TELEPHONE ENCOUNTER
Call to patient to follow up on critical hemoglobin results from yesterday. Received voicemail and left a detailed message regarding her critical lab and she needs to follow up with Dr. Nanci Higgins or her PCP as soon as possible.

## 2023-05-24 ENCOUNTER — TELEPHONE (OUTPATIENT)
Dept: BARIATRICS/WEIGHT MGMT | Age: 43
End: 2023-05-24

## 2023-05-24 ENCOUNTER — HOSPITAL ENCOUNTER (INPATIENT)
Age: 43
LOS: 3 days | Discharge: HOME | DRG: 254 | End: 2023-05-27
Attending: EMERGENCY MEDICINE | Admitting: FAMILY MEDICINE
Payer: COMMERCIAL

## 2023-05-24 DIAGNOSIS — K92.2 GASTROINTESTINAL HEMORRHAGE, UNSPECIFIED GASTROINTESTINAL HEMORRHAGE TYPE: Primary | ICD-10-CM

## 2023-05-24 DIAGNOSIS — D64.9 ANEMIA, UNSPECIFIED TYPE: ICD-10-CM

## 2023-05-24 PROBLEM — I10 PRIMARY HYPERTENSION: Status: ACTIVE | Noted: 2023-05-24

## 2023-05-24 PROBLEM — E66.9 OBESITY (BMI 35.0-39.9 WITHOUT COMORBIDITY): Status: ACTIVE | Noted: 2023-05-24

## 2023-05-24 PROBLEM — D50.0 IRON DEFICIENCY ANEMIA DUE TO CHRONIC BLOOD LOSS: Status: ACTIVE | Noted: 2023-05-24

## 2023-05-24 LAB
ABO + RH BLD: NORMAL
ALBUMIN SERPL-MCNC: 4 G/DL (ref 3.5–5.2)
ALP SERPL-CCNC: 106 U/L (ref 35–104)
ALT SERPL-CCNC: 18 U/L (ref 0–32)
ANION GAP SERPL CALCULATED.3IONS-SCNC: 11 MMOL/L (ref 7–16)
ANISOCYTOSIS: ABNORMAL
AST SERPL-CCNC: 20 U/L (ref 0–31)
BASOPHILS # BLD: 0.08 E9/L (ref 0–0.2)
BASOPHILS NFR BLD: 1.8 % (ref 0–2)
BILIRUB SERPL-MCNC: 0.2 MG/DL (ref 0–1.2)
BLD GP AB SCN SERPL QL: NORMAL
BLOOD BANK DISPENSE STATUS: NORMAL
BLOOD BANK PRODUCT CODE: NORMAL
BPU ID: NORMAL
BUN SERPL-MCNC: 8 MG/DL (ref 6–20)
CALCIUM SERPL-MCNC: 8.7 MG/DL (ref 8.6–10.2)
CHLORIDE SERPL-SCNC: 110 MMOL/L (ref 98–107)
CO2 SERPL-SCNC: 20 MMOL/L (ref 22–29)
CREAT SERPL-MCNC: 0.7 MG/DL (ref 0.5–1)
DESCRIPTION BLOOD BANK: NORMAL
EOSINOPHIL # BLD: 0 E9/L (ref 0.05–0.5)
EOSINOPHIL NFR BLD: 1.5 % (ref 0–6)
ERYTHROCYTE [DISTWIDTH] IN BLOOD BY AUTOMATED COUNT: 19.9 FL (ref 11.5–15)
GLUCOSE SERPL-MCNC: 84 MG/DL (ref 74–99)
HCG SERPL QL: NEGATIVE
HCT VFR BLD AUTO: 24.7 % (ref 34–48)
HCT VFR BLD AUTO: 25.3 % (ref 34–48)
HGB BLD-MCNC: 6.9 G/DL (ref 11.5–15.5)
HGB BLD-MCNC: 7.4 G/DL (ref 11.5–15.5)
HYPOCHROMIA: ABNORMAL
LYMPHOCYTES # BLD: 1.74 E9/L (ref 1.5–4)
LYMPHOCYTES NFR BLD: 36.6 % (ref 20–42)
MCH RBC QN AUTO: 18.1 PG (ref 26–35)
MCHC RBC AUTO-ENTMCNC: 27.9 % (ref 32–34.5)
MCV RBC AUTO: 64.8 FL (ref 80–99.9)
MONOCYTES # BLD: 0.19 E9/L (ref 0.1–0.95)
MONOCYTES NFR BLD: 3.6 % (ref 2–12)
NEUTROPHILS # BLD: 2.73 E9/L (ref 1.8–7.3)
NEUTS SEG NFR BLD: 58 % (ref 43–80)
NRBC BLD-RTO: 0.9 /100 WBC
OVALOCYTES: ABNORMAL
PLATELET # BLD AUTO: 371 E9/L (ref 130–450)
PMV BLD AUTO: 9.3 FL (ref 7–12)
POIKILOCYTES: ABNORMAL
POLYCHROMASIA: ABNORMAL
POTASSIUM SERPL-SCNC: 3.6 MMOL/L (ref 3.5–5)
PROT SERPL-MCNC: 6.8 G/DL (ref 6.4–8.3)
RBC # BLD AUTO: 3.81 E12/L (ref 3.5–5.5)
SODIUM SERPL-SCNC: 141 MMOL/L (ref 132–146)
TARGET CELLS: ABNORMAL
TEAR DROP CELLS: ABNORMAL
TROPONIN, HIGH SENSITIVITY: 13 NG/L (ref 0–9)
TROPONIN, HIGH SENSITIVITY: 14 NG/L (ref 0–9)
WBC # BLD: 4.7 E9/L (ref 4.5–11.5)

## 2023-05-24 PROCEDURE — 1200000000 HC SEMI PRIVATE

## 2023-05-24 PROCEDURE — 99285 EMERGENCY DEPT VISIT HI MDM: CPT

## 2023-05-24 PROCEDURE — 2580000003 HC RX 258: Performed by: HOSPITALIST

## 2023-05-24 PROCEDURE — 85018 HEMOGLOBIN: CPT

## 2023-05-24 PROCEDURE — 85025 COMPLETE CBC W/AUTO DIFF WBC: CPT

## 2023-05-24 PROCEDURE — 6360000002 HC RX W HCPCS: Performed by: STUDENT IN AN ORGANIZED HEALTH CARE EDUCATION/TRAINING PROGRAM

## 2023-05-24 PROCEDURE — 30233N1 TRANSFUSION OF NONAUTOLOGOUS RED BLOOD CELLS INTO PERIPHERAL VEIN, PERCUTANEOUS APPROACH: ICD-10-PCS | Performed by: EMERGENCY MEDICINE

## 2023-05-24 PROCEDURE — 6370000000 HC RX 637 (ALT 250 FOR IP): Performed by: NURSE PRACTITIONER

## 2023-05-24 PROCEDURE — 80053 COMPREHEN METABOLIC PANEL: CPT

## 2023-05-24 PROCEDURE — 85014 HEMATOCRIT: CPT

## 2023-05-24 PROCEDURE — 84484 ASSAY OF TROPONIN QUANT: CPT

## 2023-05-24 PROCEDURE — 86850 RBC ANTIBODY SCREEN: CPT

## 2023-05-24 PROCEDURE — 84703 CHORIONIC GONADOTROPIN ASSAY: CPT

## 2023-05-24 PROCEDURE — 86900 BLOOD TYPING SEROLOGIC ABO: CPT

## 2023-05-24 PROCEDURE — 86901 BLOOD TYPING SEROLOGIC RH(D): CPT

## 2023-05-24 PROCEDURE — C9113 INJ PANTOPRAZOLE SODIUM, VIA: HCPCS | Performed by: STUDENT IN AN ORGANIZED HEALTH CARE EDUCATION/TRAINING PROGRAM

## 2023-05-24 PROCEDURE — 6360000002 HC RX W HCPCS: Performed by: NURSE PRACTITIONER

## 2023-05-24 PROCEDURE — C9113 INJ PANTOPRAZOLE SODIUM, VIA: HCPCS | Performed by: HOSPITALIST

## 2023-05-24 PROCEDURE — P9016 RBC LEUKOCYTES REDUCED: HCPCS

## 2023-05-24 PROCEDURE — 96365 THER/PROPH/DIAG IV INF INIT: CPT

## 2023-05-24 PROCEDURE — 36415 COLL VENOUS BLD VENIPUNCTURE: CPT

## 2023-05-24 PROCEDURE — 6360000002 HC RX W HCPCS: Performed by: HOSPITALIST

## 2023-05-24 PROCEDURE — 2580000003 HC RX 258: Performed by: STUDENT IN AN ORGANIZED HEALTH CARE EDUCATION/TRAINING PROGRAM

## 2023-05-24 PROCEDURE — 96361 HYDRATE IV INFUSION ADD-ON: CPT

## 2023-05-24 PROCEDURE — 2580000003 HC RX 258: Performed by: NURSE PRACTITIONER

## 2023-05-24 PROCEDURE — 86923 COMPATIBILITY TEST ELECTRIC: CPT

## 2023-05-24 RX ORDER — ACETAMINOPHEN 325 MG/1
650 TABLET ORAL EVERY 6 HOURS PRN
Status: DISCONTINUED | OUTPATIENT
Start: 2023-05-24 | End: 2023-05-27 | Stop reason: HOSPADM

## 2023-05-24 RX ORDER — ACETAMINOPHEN 650 MG/1
650 SUPPOSITORY RECTAL EVERY 6 HOURS PRN
Status: DISCONTINUED | OUTPATIENT
Start: 2023-05-24 | End: 2023-05-27 | Stop reason: HOSPADM

## 2023-05-24 RX ORDER — SODIUM CHLORIDE AND POTASSIUM CHLORIDE 300; 900 MG/100ML; MG/100ML
INJECTION, SOLUTION INTRAVENOUS CONTINUOUS
Status: DISCONTINUED | OUTPATIENT
Start: 2023-05-24 | End: 2023-05-27 | Stop reason: HOSPADM

## 2023-05-24 RX ORDER — SODIUM CHLORIDE 0.9 % (FLUSH) 0.9 %
5-40 SYRINGE (ML) INJECTION EVERY 12 HOURS SCHEDULED
Status: DISCONTINUED | OUTPATIENT
Start: 2023-05-24 | End: 2023-05-27 | Stop reason: HOSPADM

## 2023-05-24 RX ORDER — SODIUM CHLORIDE 9 MG/ML
INJECTION, SOLUTION INTRAVENOUS PRN
Status: DISCONTINUED | OUTPATIENT
Start: 2023-05-24 | End: 2023-05-27 | Stop reason: HOSPADM

## 2023-05-24 RX ORDER — ONDANSETRON 4 MG/1
4 TABLET, ORALLY DISINTEGRATING ORAL EVERY 8 HOURS PRN
Status: DISCONTINUED | OUTPATIENT
Start: 2023-05-24 | End: 2023-05-27 | Stop reason: HOSPADM

## 2023-05-24 RX ORDER — POTASSIUM CHLORIDE 7.45 MG/ML
10 INJECTION INTRAVENOUS PRN
Status: DISCONTINUED | OUTPATIENT
Start: 2023-05-24 | End: 2023-05-27 | Stop reason: HOSPADM

## 2023-05-24 RX ORDER — LISINOPRIL 10 MG/1
10 TABLET ORAL DAILY
Status: DISCONTINUED | OUTPATIENT
Start: 2023-05-24 | End: 2023-05-27 | Stop reason: HOSPADM

## 2023-05-24 RX ORDER — POTASSIUM CHLORIDE 20 MEQ/1
40 TABLET, EXTENDED RELEASE ORAL PRN
Status: DISCONTINUED | OUTPATIENT
Start: 2023-05-24 | End: 2023-05-27 | Stop reason: HOSPADM

## 2023-05-24 RX ORDER — ONDANSETRON 2 MG/ML
4 INJECTION INTRAMUSCULAR; INTRAVENOUS EVERY 6 HOURS PRN
Status: DISCONTINUED | OUTPATIENT
Start: 2023-05-24 | End: 2023-05-27 | Stop reason: HOSPADM

## 2023-05-24 RX ORDER — MAGNESIUM SULFATE 1 G/100ML
1000 INJECTION INTRAVENOUS PRN
Status: DISCONTINUED | OUTPATIENT
Start: 2023-05-24 | End: 2023-05-27 | Stop reason: HOSPADM

## 2023-05-24 RX ORDER — SODIUM CHLORIDE 0.9 % (FLUSH) 0.9 %
5-40 SYRINGE (ML) INJECTION PRN
Status: DISCONTINUED | OUTPATIENT
Start: 2023-05-24 | End: 2023-05-27 | Stop reason: HOSPADM

## 2023-05-24 RX ORDER — 0.9 % SODIUM CHLORIDE 0.9 %
1000 INTRAVENOUS SOLUTION INTRAVENOUS ONCE
Status: COMPLETED | OUTPATIENT
Start: 2023-05-24 | End: 2023-05-24

## 2023-05-24 RX ADMIN — POTASSIUM CHLORIDE AND SODIUM CHLORIDE: 900; 300 INJECTION, SOLUTION INTRAVENOUS at 14:40

## 2023-05-24 RX ADMIN — PANTOPRAZOLE SODIUM 80 MG: 40 INJECTION, POWDER, FOR SOLUTION INTRAVENOUS at 11:28

## 2023-05-24 RX ADMIN — SODIUM CHLORIDE 1000 ML: 9 INJECTION, SOLUTION INTRAVENOUS at 11:11

## 2023-05-24 RX ADMIN — SODIUM CHLORIDE 100 MG: 9 INJECTION, SOLUTION INTRAVENOUS at 16:17

## 2023-05-24 RX ADMIN — LISINOPRIL 10 MG: 10 TABLET ORAL at 14:41

## 2023-05-24 RX ADMIN — SODIUM CHLORIDE 25 MG: 9 INJECTION, SOLUTION INTRAVENOUS at 14:52

## 2023-05-24 RX ADMIN — PANTOPRAZOLE SODIUM 8 MG/HR: 40 INJECTION, POWDER, FOR SOLUTION INTRAVENOUS at 18:01

## 2023-05-24 RX ADMIN — POTASSIUM CHLORIDE AND SODIUM CHLORIDE: 900; 300 INJECTION, SOLUTION INTRAVENOUS at 20:49

## 2023-05-24 RX ADMIN — ACETAMINOPHEN 650 MG: 325 TABLET ORAL at 16:04

## 2023-05-24 RX ADMIN — PANTOPRAZOLE SODIUM 8 MG/HR: 40 INJECTION, POWDER, FOR SOLUTION INTRAVENOUS at 20:50

## 2023-05-24 ASSESSMENT — PAIN DESCRIPTION - LOCATION
LOCATION: HEAD
LOCATION: HEAD

## 2023-05-24 ASSESSMENT — PAIN - FUNCTIONAL ASSESSMENT
PAIN_FUNCTIONAL_ASSESSMENT: ACTIVITIES ARE NOT PREVENTED
PAIN_FUNCTIONAL_ASSESSMENT: ACTIVITIES ARE NOT PREVENTED

## 2023-05-24 ASSESSMENT — PAIN SCALES - GENERAL
PAINLEVEL_OUTOF10: 1
PAINLEVEL_OUTOF10: 5

## 2023-05-24 ASSESSMENT — PAIN DESCRIPTION - DESCRIPTORS
DESCRIPTORS: ACHING
DESCRIPTORS: GNAWING;DULL

## 2023-05-24 ASSESSMENT — PAIN DESCRIPTION - PAIN TYPE: TYPE: ACUTE PAIN

## 2023-05-24 ASSESSMENT — PAIN DESCRIPTION - FREQUENCY: FREQUENCY: CONTINUOUS

## 2023-05-24 ASSESSMENT — PAIN DESCRIPTION - ONSET: ONSET: ON-GOING

## 2023-05-24 NOTE — ED NOTES
Received critical hemoglobin 6.9 from lab. Provider notified.       Aleyda Hampton RN  05/24/23 3862

## 2023-05-24 NOTE — TELEPHONE ENCOUNTER
Received call back from patient. Instructed her to go to [address] [place] [address] for evaluation of critical low hemoglobin of 6.9. Patient states she will go to the ER.

## 2023-05-24 NOTE — ED NOTES
Pt report called to 4th floor. Pt stable for transport now. Blood transfusion running now.       Kianna Lepe RN  05/24/23 8415

## 2023-05-24 NOTE — CONSULTS
Department of Internal Medicine  Internal Medicine Consultation Note    Primary Care Physician: Clara Hutchison DO   Admitting Physician:  Clara Hutchison DO  Admission date and time: 5/24/2023 10:25 AM    Room:  [facility]  Admitting diagnosis: GI bleed [K92.2]  Gastrointestinal hemorrhage, unspecified gastrointestinal hemorrhage type [K92.2]  Anemia, unspecified type [D64.9]    Patient Name: Sudhir Mccann  MRN: [id number]    Date of Service: 5/24/2023     Reason for consultation:  Medical management    HISTORY OF PRESENT ILLNESS:    Sudhir Mccann is a 43-year-old female patient who presented to [address] [place] [address] with complaints of dark stools, shortness of breath. She had a gastric bypass surgery approximately 7 years ago by Dr. Krystal Drew but was lost in follow-up. She had outpatient lab studies that revealed anemia. She contacted Dr. Renée Crews office and was advised to come to the ER for evaluation. She was tachycardic on arrival but was hypertensive. Hemoglobin was 6.9 with severely microcytic and hypochromic indices with RDW of nearly 20. Otherwise your laboratory evaluation was negative. Type, screen and crossmatch were obtained transfusion will include. Case was discussed between the ER physician and Dr. Christina Edmonds, the patient's PCP, the patient was subsequent admitted to the service of the Dr. Christina Edmonds for further care and management. Internal medicine consultation was requested. Patient seen and examined at bedside with family present. She confirms above given history. Shortness of breath has been over the last several days. Most notable with exertion. Not necessarily at rest.  No chest pain or palpitations. No fluttering. No fever, chills, cough, phlegm production or hemoptysis. She is mildly dizzy with quick position changes but this equilibrates quickly. No headache or acute neurologic complaints otherwise aside from generalized weakness.   No abdominal pain, vomiting, but does admit to loose

## 2023-05-24 NOTE — TELEPHONE ENCOUNTER
Received update from Dr. Scott Saldivar that he wants patient to be evaluated in the ER for critical hemoglobin. Call to patient, received voicemail and left detailed message.

## 2023-05-24 NOTE — H&P
Department of Family Practice  Attending History and Physical        CHIEF COMPLAINT:  SHORTESS OF BREATH, BLACK STOOLS, ABDOMINAL CRAMPS    Reason for Admission:  SEVERE ANEMIA, UPPER GI BLEED    History Obtained From:  patient, electronic medical record, Quality of history:  good historian    HISTORY OF PRESENT ILLNESS:      The patient is a 36 y.o. female with significant past medical history of GASTRIC BYPASS ABOUT 7 YEARS AGO who presents with ABDOMINAL PAIN , BLACK STOOLS AND DYSPNEA IS ADMITTED FROM THE ED. [place] [facility] DR. FRAZIER LAST YEAR AND HAD AN APPOINTMENT TODAY BUT WAS INSTRUCTED TO REPORT TO THE ED. HER HGB WAS 6.9. SHE RECEIVED A UNIT OF BLOOD AND THE HGB IS UP TO 7.4. SHE DENIED ANY NAUSEA. Past Medical History:        Diagnosis Date    Arthritis     Hypertension     during pregnancy    Iron deficiency anemia due to chronic blood loss 2023    Iron deficiency anemia due to chronic blood loss 2023    Obesity (BMI 35.0-39.9 without comorbidity) 2023    Primary hypertension 2023     Past Surgical History:        Procedure Laterality Date    ABDOMEN SURGERY       SECTION  ,,,,    DILATION AND CURETTAGE OF UTERUS N/A 2021    IUD REMOVAL (ASHLY) (CPT 81061) performed by Markus Rangel MD at [address] [place] [address] N/A 2020    LAPAROSCOPIC HIATAL HERNIA REPAIR WITH MESH, TRUNCAL VAGOTOMY performed by Elsi Munguia MD at [facility]  2017    Laparascopic    UPPER GASTROINTESTINAL ENDOSCOPY  2016    Dr. Jacinto Robert N/A 10/9/2020    EGD BIOPSY performed by Elsi Munguia MD at [address]       Immunizations:              Influenza:   Indicated for current flu vaccination season Oct. to Feb.            Pneumococcal Polysaccharide:  Indicated for current flu vaccination season Oct. to Feb.    Medications Prior to Admission:

## 2023-05-24 NOTE — ED PROVIDER NOTES
separately billable procedures. Clinical Impression  1. Gastrointestinal hemorrhage, unspecified gastrointestinal hemorrhage type    2. Anemia, unspecified type          Disposition  Patient's disposition: Admit to telemetry  Patient's condition is stable.                                            Alyse Arteaga MD  Resident  05/24/23 4335

## 2023-05-25 ENCOUNTER — ANESTHESIA EVENT (OUTPATIENT)
Dept: ENDOSCOPY | Age: 43
End: 2023-05-25
Payer: COMMERCIAL

## 2023-05-25 ENCOUNTER — ANESTHESIA (OUTPATIENT)
Dept: ENDOSCOPY | Age: 43
End: 2023-05-25
Payer: COMMERCIAL

## 2023-05-25 LAB
ALBUMIN SERPL-MCNC: 3.5 G/DL (ref 3.5–5.2)
ALP SERPL-CCNC: 91 U/L (ref 35–104)
ALT SERPL-CCNC: 15 U/L (ref 0–32)
ANION GAP SERPL CALCULATED.3IONS-SCNC: 9 MMOL/L (ref 7–16)
ANISOCYTOSIS: ABNORMAL
AST SERPL-CCNC: 19 U/L (ref 0–31)
BASOPHILS # BLD: 0.05 E9/L (ref 0–0.2)
BASOPHILS NFR BLD: 0.9 % (ref 0–2)
BILIRUB DIRECT SERPL-MCNC: <0.2 MG/DL (ref 0–0.3)
BILIRUB INDIRECT SERPL-MCNC: ABNORMAL MG/DL (ref 0–1)
BILIRUB SERPL-MCNC: 0.4 MG/DL (ref 0–1.2)
BUN SERPL-MCNC: 5 MG/DL (ref 6–20)
CALCIUM SERPL-MCNC: 8.2 MG/DL (ref 8.6–10.2)
CHLORIDE SERPL-SCNC: 112 MMOL/L (ref 98–107)
CHOLESTEROL, TOTAL: 130 MG/DL (ref 0–199)
CO2 SERPL-SCNC: 20 MMOL/L (ref 22–29)
COPPER SERPL-MCNC: 66.9 UG/DL (ref 80–155)
CREAT SERPL-MCNC: 0.7 MG/DL (ref 0.5–1)
EOSINOPHIL # BLD: 0 E9/L (ref 0.05–0.5)
EOSINOPHIL NFR BLD: 1.8 % (ref 0–6)
ERYTHROCYTE [DISTWIDTH] IN BLOOD BY AUTOMATED COUNT: 21.4 FL (ref 11.5–15)
GLUCOSE SERPL-MCNC: 95 MG/DL (ref 74–99)
HCT VFR BLD AUTO: 24.4 % (ref 34–48)
HCT VFR BLD AUTO: 24.4 % (ref 34–48)
HCT VFR BLD AUTO: 25.4 % (ref 34–48)
HCT VFR BLD AUTO: 25.8 % (ref 34–48)
HDLC SERPL-MCNC: 54 MG/DL
HGB BLD-MCNC: 6.9 G/DL (ref 11.5–15.5)
HGB BLD-MCNC: 7.1 G/DL (ref 11.5–15.5)
HGB BLD-MCNC: 7.1 G/DL (ref 11.5–15.5)
HGB BLD-MCNC: 7.3 G/DL (ref 11.5–15.5)
HYPOCHROMIA: ABNORMAL
LDLC SERPL CALC-MCNC: 54 MG/DL (ref 0–99)
LYMPHOCYTES # BLD: 0.92 E9/L (ref 1.5–4)
LYMPHOCYTES NFR BLD: 14.8 % (ref 20–42)
MAGNESIUM SERPL-MCNC: 2.2 MG/DL (ref 1.6–2.6)
MCH RBC QN AUTO: 19.4 PG (ref 26–35)
MCHC RBC AUTO-ENTMCNC: 28.7 % (ref 32–34.5)
MCV RBC AUTO: 67.6 FL (ref 80–99.9)
MONOCYTES # BLD: 0.24 E9/L (ref 0.1–0.95)
MONOCYTES NFR BLD: 3.5 % (ref 2–12)
NEUTROPHILS # BLD: 4.94 E9/L (ref 1.8–7.3)
NEUTS SEG NFR BLD: 80.8 % (ref 43–80)
NRBC BLD-RTO: 0.9 /100 WBC
OVALOCYTES: ABNORMAL
PHOSPHATE SERPL-MCNC: 2.5 MG/DL (ref 2.5–4.5)
PLATELET # BLD AUTO: 327 E9/L (ref 130–450)
PMV BLD AUTO: 9.6 FL (ref 7–12)
POIKILOCYTES: ABNORMAL
POLYCHROMASIA: ABNORMAL
POTASSIUM SERPL-SCNC: 3.8 MMOL/L (ref 3.5–5)
PROT SERPL-MCNC: 6 G/DL (ref 6.4–8.3)
RBC # BLD AUTO: 3.76 E12/L (ref 3.5–5.5)
SELENIUM SERPL-MCNC: 113.1 UG/L (ref 23–190)
SODIUM SERPL-SCNC: 141 MMOL/L (ref 132–146)
T4 FREE SERPL-MCNC: 1.33 NG/DL (ref 0.93–1.7)
TARGET CELLS: ABNORMAL
TEAR DROP CELLS: ABNORMAL
TRIGL SERPL-MCNC: 111 MG/DL (ref 0–149)
TSH SERPL-MCNC: 0.8 UIU/ML (ref 0.27–4.2)
VIT B1 BLD-MCNC: 87 NMOL/L (ref 70–180)
VLDLC SERPL CALC-MCNC: 22 MG/DL
WBC # BLD: 6.1 E9/L (ref 4.5–11.5)
ZINC SERPL-MCNC: 77.1 UG/DL (ref 60–120)

## 2023-05-25 PROCEDURE — 83735 ASSAY OF MAGNESIUM: CPT

## 2023-05-25 PROCEDURE — 84439 ASSAY OF FREE THYROXINE: CPT

## 2023-05-25 PROCEDURE — 2580000003 HC RX 258

## 2023-05-25 PROCEDURE — 85025 COMPLETE CBC W/AUTO DIFF WBC: CPT

## 2023-05-25 PROCEDURE — 3700000001 HC ADD 15 MINUTES (ANESTHESIA): Performed by: INTERNAL MEDICINE

## 2023-05-25 PROCEDURE — 85018 HEMOGLOBIN: CPT

## 2023-05-25 PROCEDURE — 1200000000 HC SEMI PRIVATE

## 2023-05-25 PROCEDURE — 83036 HEMOGLOBIN GLYCOSYLATED A1C: CPT

## 2023-05-25 PROCEDURE — 80061 LIPID PANEL: CPT

## 2023-05-25 PROCEDURE — 85014 HEMATOCRIT: CPT

## 2023-05-25 PROCEDURE — 80048 BASIC METABOLIC PNL TOTAL CA: CPT

## 2023-05-25 PROCEDURE — 6370000000 HC RX 637 (ALT 250 FOR IP): Performed by: FAMILY MEDICINE

## 2023-05-25 PROCEDURE — 2709999900 HC NON-CHARGEABLE SUPPLY: Performed by: INTERNAL MEDICINE

## 2023-05-25 PROCEDURE — 0DJ08ZZ INSPECTION OF UPPER INTESTINAL TRACT, VIA NATURAL OR ARTIFICIAL OPENING ENDOSCOPIC: ICD-10-PCS | Performed by: INTERNAL MEDICINE

## 2023-05-25 PROCEDURE — 6360000002 HC RX W HCPCS: Performed by: NURSE ANESTHETIST, CERTIFIED REGISTERED

## 2023-05-25 PROCEDURE — 80076 HEPATIC FUNCTION PANEL: CPT

## 2023-05-25 PROCEDURE — 36415 COLL VENOUS BLD VENIPUNCTURE: CPT

## 2023-05-25 PROCEDURE — 6360000002 HC RX W HCPCS: Performed by: HOSPITALIST

## 2023-05-25 PROCEDURE — 6370000000 HC RX 637 (ALT 250 FOR IP): Performed by: NURSE PRACTITIONER

## 2023-05-25 PROCEDURE — 97161 PT EVAL LOW COMPLEX 20 MIN: CPT

## 2023-05-25 PROCEDURE — 84100 ASSAY OF PHOSPHORUS: CPT

## 2023-05-25 PROCEDURE — 6360000002 HC RX W HCPCS

## 2023-05-25 PROCEDURE — 6370000000 HC RX 637 (ALT 250 FOR IP)

## 2023-05-25 PROCEDURE — 7100000011 HC PHASE II RECOVERY - ADDTL 15 MIN: Performed by: INTERNAL MEDICINE

## 2023-05-25 PROCEDURE — C9113 INJ PANTOPRAZOLE SODIUM, VIA: HCPCS | Performed by: HOSPITALIST

## 2023-05-25 PROCEDURE — 7100000010 HC PHASE II RECOVERY - FIRST 15 MIN: Performed by: INTERNAL MEDICINE

## 2023-05-25 PROCEDURE — 6360000002 HC RX W HCPCS: Performed by: NURSE PRACTITIONER

## 2023-05-25 PROCEDURE — 3609017100 HC EGD: Performed by: INTERNAL MEDICINE

## 2023-05-25 PROCEDURE — 2580000003 HC RX 258: Performed by: HOSPITALIST

## 2023-05-25 PROCEDURE — 3700000000 HC ANESTHESIA ATTENDED CARE: Performed by: INTERNAL MEDICINE

## 2023-05-25 PROCEDURE — 99222 1ST HOSP IP/OBS MODERATE 55: CPT | Performed by: SURGERY

## 2023-05-25 PROCEDURE — 84443 ASSAY THYROID STIM HORMONE: CPT

## 2023-05-25 RX ORDER — SODIUM CHLORIDE 9 MG/ML
INJECTION, SOLUTION INTRAVENOUS PRN
Status: DISCONTINUED | OUTPATIENT
Start: 2023-05-25 | End: 2023-05-27 | Stop reason: HOSPADM

## 2023-05-25 RX ORDER — PROPOFOL 10 MG/ML
INJECTION, EMULSION INTRAVENOUS PRN
Status: DISCONTINUED | OUTPATIENT
Start: 2023-05-25 | End: 2023-05-25 | Stop reason: SDUPTHER

## 2023-05-25 RX ORDER — CETIRIZINE HYDROCHLORIDE 10 MG/1
10 TABLET ORAL DAILY
Status: DISCONTINUED | OUTPATIENT
Start: 2023-05-25 | End: 2023-05-27 | Stop reason: HOSPADM

## 2023-05-25 RX ORDER — FLUCONAZOLE 100 MG/1
200 TABLET ORAL DAILY
Status: DISCONTINUED | OUTPATIENT
Start: 2023-05-25 | End: 2023-05-27 | Stop reason: HOSPADM

## 2023-05-25 RX ADMIN — POTASSIUM CHLORIDE AND SODIUM CHLORIDE: 900; 300 INJECTION, SOLUTION INTRAVENOUS at 06:40

## 2023-05-25 RX ADMIN — FLUCONAZOLE 200 MG: 100 TABLET ORAL at 09:11

## 2023-05-25 RX ADMIN — PANTOPRAZOLE SODIUM 8 MG/HR: 40 INJECTION, POWDER, FOR SOLUTION INTRAVENOUS at 16:50

## 2023-05-25 RX ADMIN — ACETAMINOPHEN 650 MG: 325 TABLET ORAL at 16:49

## 2023-05-25 RX ADMIN — PANTOPRAZOLE SODIUM 8 MG/HR: 40 INJECTION, POWDER, FOR SOLUTION INTRAVENOUS at 06:40

## 2023-05-25 RX ADMIN — ACETAMINOPHEN 650 MG: 325 TABLET ORAL at 06:43

## 2023-05-25 RX ADMIN — SODIUM CHLORIDE 125 MG: 9 INJECTION, SOLUTION INTRAVENOUS at 09:17

## 2023-05-25 RX ADMIN — CETIRIZINE HYDROCHLORIDE 10 MG: 10 TABLET, FILM COATED ORAL at 20:36

## 2023-05-25 RX ADMIN — PROPOFOL 230 MG: 10 INJECTION, EMULSION INTRAVENOUS at 11:00

## 2023-05-25 RX ADMIN — LISINOPRIL 10 MG: 10 TABLET ORAL at 07:43

## 2023-05-25 ASSESSMENT — PAIN DESCRIPTION - LOCATION: LOCATION: HEAD

## 2023-05-25 ASSESSMENT — PAIN SCALES - GENERAL: PAINLEVEL_OUTOF10: 4

## 2023-05-25 ASSESSMENT — ENCOUNTER SYMPTOMS: SHORTNESS OF BREATH: 0

## 2023-05-25 NOTE — CONSULTS
GENERAL SURGERY  CONSULT NOTE  2023    Chief Complaint   Patient presents with    Discuss Labs     Sent from Dr Emily Castillo office for low hemoglobin        HPI  Sage Hatch is a 36 y.o. female for whom general surgery was consulted for the evaluation of anemia. The patient has had a Shannon-en-Y gastric bypass about 5 years ago. She also had a hiatal hernia repair. She states that she has been having some abdominal pain which she states is a crampy-like sensation in the middle of her abdomen. It does not radiate anywhere. For the last week she has had worsening shortness of breath with ambulation and dark tarry stools. She does not normally have this at baseline. And this is the first time that she has had this problem. On evaluation she underwent laboratory work which showed a hemoglobin of 6.9. She was given a unit of red blood cells and she responded to 7.4 afterwards. She was started on Protonix drip and general surgery was consulted for further evaluation possible treatment. On evaluation, she has some tenderness palpation in her mid abdomen. Otherwise she appears nontoxic.   Her vital signs of been grossly normal.      Patient Active Problem List   Diagnosis    S/P gastric bypass    Gastroesophageal reflux disease without esophagitis    Marginal ulcer    Hiatal hernia    COVID    GI bleed    Primary hypertension    Obesity (BMI 35.0-39.9 without comorbidity)    Iron deficiency anemia due to chronic blood loss       Past Medical History:   Diagnosis Date    Arthritis     Hypertension     during pregnancy    Iron deficiency anemia due to chronic blood loss 2023    Iron deficiency anemia due to chronic blood loss 2023    Obesity (BMI 35.0-39.9 without comorbidity) 2023    Primary hypertension 2023       Past Surgical History:   Procedure Laterality Date    ABDOMEN SURGERY       SECTION  ,,,,    DILATION AND CURETTAGE OF UTERUS N/A 2021    IUD

## 2023-05-25 NOTE — CONSULTS
Levar Bourgeois M.D. [facility]  Dr. Miriam Sims M.D.,  Dr. Karol Harding M.D.,  Dr. Guido Ochoa D.O.,  Dr. Reynaldo Castro D.O. ,  Dr. Zhanna Rojas M.D.,          Julia Danica  36 y.o.  female      Re: Upper GI bleed, prior gastric bypass  Requesting physician: Dr. Vlaencia Forth  Date:9:37 AM 2023          HPI: 23-year-old female patient seen in the hospital for above-described issue. She presents with approximately 1 week of black tarry stools. She denies any significant hematemesis or emesis of coffee-ground material.  Patient was found to be significantly anemic on presentation. She was transfused with appropriate response. Patient has history of gastric bypass and was seen by general surgery however apparently at this time they would prefer gastroenterology to perform endoscopy. Patient complains of some abdominal pain which she localizes in the mid upper abdomen. She reports small amount of blood in the stool which according to her is secondary to her hemorrhoids for which she has been talking to her surgeon.     Information sources:   -Patient  -medical record  -health care team    PMHx:  Past Medical History:   Diagnosis Date    Arthritis     Hypertension     during pregnancy    Iron deficiency anemia due to chronic blood loss 2023    Iron deficiency anemia due to chronic blood loss 2023    Obesity (BMI 35.0-39.9 without comorbidity) 2023    Primary hypertension 2023       PSHx:  Past Surgical History:   Procedure Laterality Date    ABDOMEN SURGERY       SECTION  ,,,,    DILATION AND CURETTAGE OF UTERUS N/A 2021    IUD REMOVAL (Mele Preston) (CPT 64132) performed by Josue Ewing MD at [address] [place] [address] N/A 2020    LAPAROSCOPIC HIATAL HERNIA REPAIR WITH MESH, TRUNCAL VAGOTOMY performed by Debbie Nava MD at [address] [place] [address]  2017    Laparascopic    UPPER GASTROINTESTINAL ENDOSCOPY

## 2023-05-25 NOTE — CARE COORDINATION
Ss note: 5/25/2023.9:48 AM Consult noted for discharge planning. Attempted to meet with pt, currently on phone and requests sw to return at later time. PER IDR pt for EGD this morning. Per chart review pt presents from home and is independent.  Veto Brunner, LSW

## 2023-05-25 NOTE — ANESTHESIA PRE PROCEDURE
Department of Anesthesiology  Preprocedure Note       Name:  Alessandro Myers   Age:  36 y.o.  :  1980                                          MRN:  [id number]         Date:  2023      Surgeon: Kj Cloud):  Nance Apgar, MD    Procedure: Procedure(s):  EGD ESOPHAGOGASTRODUODENOSCOPY    Medications prior to admission:   Prior to Admission medications    Medication Sig Start Date End Date Taking? Authorizing Provider   acetaminophen (TYLENOL) 500 MG tablet Take 1 tablet by mouth 4 times daily as needed for Pain 3/7/23   Umesh Calderón MD   vitamin D (ERGOCALCIFEROL) 1.25 MG (00725 UT) CAPS capsule TAKE 1 CAPLET ORALLY TWICE PER WEEK FOR 28 DAYS 20   Historical Provider, MD   lisinopril (PRINIVIL;ZESTRIL) 10 MG tablet TAKE 1 TABLET BY MOUTH EVERY DAY 20   Historical Provider, MD   loratadine (CLARITIN) 10 MG tablet TAKE 1 TABLET BY MOUTH EVERY DAY AS NEEDED 20   Historical Provider, MD   clotrimazole-betamethasone (LOTRISONE) 1-0.05 % cream APPLY TOPICALLY THREE TIMES DAILY 20   Historical Provider, MD   albuterol sulfate  (90 Base) MCG/ACT inhaler TAKE 1 PUFF INHALED 4 TIMES PER DAY TAKE AS NEEDED 20   Historical Provider, MD   Diclofenac Sodium  MG TB24 TAKE 1 TABLET, 1 TIME PER DAY. WITH FOOD 19   Historical Provider, MD   fluticasone Inez Beat) 50 MCG/ACT nasal spray as needed 3/4/19   Historical Provider, MD   Multiple Vitamins-Minerals (THERAPEUTIC MULTIVITAMIN-MINERALS) tablet Take 1 tablet by mouth 2 times daily Indications: bariatric advantage    Historical Provider, MD       Current medications:    No current facility-administered medications for this visit. No current outpatient medications on file.      Facility-Administered Medications Ordered in Other Visits   Medication Dose Route Frequency Provider Last Rate Last Admin    ferric gluconate (FERRLECIT) 125 mg in sodium chloride 0.9 % 100 mL IVPB  125 mg IntraVENous Daily SHINE Wynne -

## 2023-05-25 NOTE — ANESTHESIA POSTPROCEDURE EVALUATION
Department of Anesthesiology  Postprocedure Note    Patient: Vicente Whitley  MRN: [id number]  YOB: 1980  Date of evaluation: 5/25/2023      Procedure Summary     Date: 05/25/23 Room / Location: [address] [place] [address] [address]    Anesthesia Start: 1046 Anesthesia Stop: 1107    Procedure: EGD ESOPHAGOGASTRODUODENOSCOPY Diagnosis:       Anemia, unspecified type      (Anemia, unspecified type [D64.9])    Surgeons: Lilia Talbert MD Responsible Provider: Monalisa Pires MD    Anesthesia Type: MAC ASA Status: 2          Anesthesia Type: No value filed.     Cori Phase I:      Cori Phase II: Cori Score: 10      Anesthesia Post Evaluation    Patient location during evaluation: PACU  Patient participation: complete - patient participated  Level of consciousness: awake and alert  Airway patency: patent  Nausea & Vomiting: no nausea and no vomiting  Complications: no  Cardiovascular status: hemodynamically stable  Respiratory status: acceptable  Hydration status: euvolemic

## 2023-05-26 ENCOUNTER — APPOINTMENT (OUTPATIENT)
Dept: CT IMAGING | Age: 43
DRG: 254 | End: 2023-05-26
Payer: COMMERCIAL

## 2023-05-26 ENCOUNTER — APPOINTMENT (OUTPATIENT)
Dept: GENERAL RADIOLOGY | Age: 43
DRG: 254 | End: 2023-05-26
Payer: COMMERCIAL

## 2023-05-26 ENCOUNTER — APPOINTMENT (OUTPATIENT)
Dept: NUCLEAR MEDICINE | Age: 43
DRG: 254 | End: 2023-05-26
Payer: COMMERCIAL

## 2023-05-26 LAB
ALBUMIN SERPL-MCNC: 3.6 G/DL (ref 3.5–5.2)
ALP SERPL-CCNC: 97 U/L (ref 35–104)
ALT SERPL-CCNC: 16 U/L (ref 0–32)
ANION GAP SERPL CALCULATED.3IONS-SCNC: 11 MMOL/L (ref 7–16)
ANISOCYTOSIS: ABNORMAL
ANNOTATION COMMENT IMP: NORMAL
AST SERPL-CCNC: 18 U/L (ref 0–31)
BASOPHILS # BLD: 0 E9/L (ref 0–0.2)
BASOPHILS NFR BLD: 0.7 % (ref 0–2)
BILIRUB DIRECT SERPL-MCNC: <0.2 MG/DL (ref 0–0.3)
BILIRUB INDIRECT SERPL-MCNC: ABNORMAL MG/DL (ref 0–1)
BILIRUB SERPL-MCNC: 0.6 MG/DL (ref 0–1.2)
BLOOD BANK DISPENSE STATUS: NORMAL
BLOOD BANK PRODUCT CODE: NORMAL
BPU ID: NORMAL
BUN SERPL-MCNC: 7 MG/DL (ref 6–20)
CALCIUM SERPL-MCNC: 8.5 MG/DL (ref 8.6–10.2)
CHLORIDE SERPL-SCNC: 114 MMOL/L (ref 98–107)
CO2 SERPL-SCNC: 18 MMOL/L (ref 22–29)
CREAT SERPL-MCNC: 0.8 MG/DL (ref 0.5–1)
DESCRIPTION BLOOD BANK: NORMAL
EOSINOPHIL # BLD: 0.11 E9/L (ref 0.05–0.5)
EOSINOPHIL NFR BLD: 1.8 % (ref 0–6)
ERYTHROCYTE [DISTWIDTH] IN BLOOD BY AUTOMATED COUNT: 24.7 FL (ref 11.5–15)
GLUCOSE SERPL-MCNC: 76 MG/DL (ref 74–99)
HCT VFR BLD AUTO: 27.1 % (ref 34–48)
HCT VFR BLD AUTO: 28.5 % (ref 34–48)
HCT VFR BLD AUTO: 31.4 % (ref 34–48)
HGB BLD-MCNC: 7.9 G/DL (ref 11.5–15.5)
HGB BLD-MCNC: 8.4 G/DL (ref 11.5–15.5)
HGB BLD-MCNC: 8.8 G/DL (ref 11.5–15.5)
HYPOCHROMIA: ABNORMAL
LYMPHOCYTES # BLD: 1.02 E9/L (ref 1.5–4)
LYMPHOCYTES NFR BLD: 17.4 % (ref 20–42)
MAGNESIUM SERPL-MCNC: 1.9 MG/DL (ref 1.6–2.6)
MCH RBC QN AUTO: 20.2 PG (ref 26–35)
MCHC RBC AUTO-ENTMCNC: 29.2 % (ref 32–34.5)
MCV RBC AUTO: 69.3 FL (ref 80–99.9)
MONOCYTES # BLD: 0.12 E9/L (ref 0.1–0.95)
MONOCYTES NFR BLD: 1.7 % (ref 2–12)
NEUTROPHILS # BLD: 4.74 E9/L (ref 1.8–7.3)
NEUTS SEG NFR BLD: 79.1 % (ref 43–80)
NRBC BLD-RTO: 0.9 /100 WBC
OVALOCYTES: ABNORMAL
PHOSPHATE SERPL-MCNC: 3.4 MG/DL (ref 2.5–4.5)
PLATELET # BLD AUTO: 353 E9/L (ref 130–450)
PMV BLD AUTO: 10.4 FL (ref 7–12)
POIKILOCYTES: ABNORMAL
POLYCHROMASIA: ABNORMAL
POTASSIUM SERPL-SCNC: 4 MMOL/L (ref 3.5–5)
PROT SERPL-MCNC: 6.2 G/DL (ref 6.4–8.3)
RBC # BLD AUTO: 3.91 E12/L (ref 3.5–5.5)
RETINYL PALMITATE SERPL-MCNC: <0.02 MG/L (ref 0–0.1)
SODIUM SERPL-SCNC: 143 MMOL/L (ref 132–146)
VIT A SERPL-MCNC: 0.55 MG/L (ref 0.3–1.2)
WBC # BLD: 6 E9/L (ref 4.5–11.5)

## 2023-05-26 PROCEDURE — 83735 ASSAY OF MAGNESIUM: CPT

## 2023-05-26 PROCEDURE — 78278 ACUTE GI BLOOD LOSS IMAGING: CPT

## 2023-05-26 PROCEDURE — 2580000003 HC RX 258

## 2023-05-26 PROCEDURE — 74174 CTA ABD&PLVS W/CONTRAST: CPT

## 2023-05-26 PROCEDURE — 36415 COLL VENOUS BLD VENIPUNCTURE: CPT

## 2023-05-26 PROCEDURE — 80048 BASIC METABOLIC PNL TOTAL CA: CPT

## 2023-05-26 PROCEDURE — 6360000002 HC RX W HCPCS

## 2023-05-26 PROCEDURE — 6360000004 HC RX CONTRAST MEDICATION: Performed by: RADIOLOGY

## 2023-05-26 PROCEDURE — 6370000000 HC RX 637 (ALT 250 FOR IP): Performed by: FAMILY MEDICINE

## 2023-05-26 PROCEDURE — 2580000003 HC RX 258: Performed by: NURSE PRACTITIONER

## 2023-05-26 PROCEDURE — 6370000000 HC RX 637 (ALT 250 FOR IP)

## 2023-05-26 PROCEDURE — 84100 ASSAY OF PHOSPHORUS: CPT

## 2023-05-26 PROCEDURE — 71046 X-RAY EXAM CHEST 2 VIEWS: CPT

## 2023-05-26 PROCEDURE — 85018 HEMOGLOBIN: CPT

## 2023-05-26 PROCEDURE — 36430 TRANSFUSION BLD/BLD COMPNT: CPT

## 2023-05-26 PROCEDURE — 97165 OT EVAL LOW COMPLEX 30 MIN: CPT

## 2023-05-26 PROCEDURE — 85025 COMPLETE CBC W/AUTO DIFF WBC: CPT

## 2023-05-26 PROCEDURE — 1200000000 HC SEMI PRIVATE

## 2023-05-26 PROCEDURE — 80076 HEPATIC FUNCTION PANEL: CPT

## 2023-05-26 PROCEDURE — 85014 HEMATOCRIT: CPT

## 2023-05-26 PROCEDURE — 6370000000 HC RX 637 (ALT 250 FOR IP): Performed by: NURSE PRACTITIONER

## 2023-05-26 PROCEDURE — 6360000002 HC RX W HCPCS: Performed by: NURSE PRACTITIONER

## 2023-05-26 RX ADMIN — IOPAMIDOL 75 ML: 755 INJECTION, SOLUTION INTRAVENOUS at 13:58

## 2023-05-26 RX ADMIN — ACETAMINOPHEN 650 MG: 325 TABLET ORAL at 21:48

## 2023-05-26 RX ADMIN — LISINOPRIL 10 MG: 10 TABLET ORAL at 12:35

## 2023-05-26 RX ADMIN — FLUCONAZOLE 200 MG: 100 TABLET ORAL at 12:36

## 2023-05-26 RX ADMIN — POTASSIUM CHLORIDE AND SODIUM CHLORIDE: 900; 300 INJECTION, SOLUTION INTRAVENOUS at 05:50

## 2023-05-26 RX ADMIN — CETIRIZINE HYDROCHLORIDE 10 MG: 10 TABLET, FILM COATED ORAL at 12:36

## 2023-05-26 RX ADMIN — ACETAMINOPHEN 650 MG: 325 TABLET ORAL at 12:39

## 2023-05-26 RX ADMIN — POTASSIUM CHLORIDE AND SODIUM CHLORIDE: 900; 300 INJECTION, SOLUTION INTRAVENOUS at 21:44

## 2023-05-26 RX ADMIN — SODIUM CHLORIDE, PRESERVATIVE FREE 10 ML: 5 INJECTION INTRAVENOUS at 12:36

## 2023-05-26 RX ADMIN — SODIUM CHLORIDE 125 MG: 9 INJECTION, SOLUTION INTRAVENOUS at 15:24

## 2023-05-26 RX ADMIN — ACETAMINOPHEN 650 MG: 325 TABLET ORAL at 02:41

## 2023-05-26 ASSESSMENT — PAIN DESCRIPTION - LOCATION
LOCATION: HEAD

## 2023-05-26 ASSESSMENT — PAIN DESCRIPTION - ORIENTATION
ORIENTATION: RIGHT;LEFT
ORIENTATION: MID

## 2023-05-26 ASSESSMENT — PAIN DESCRIPTION - DESCRIPTORS
DESCRIPTORS: ACHING
DESCRIPTORS: ACHING
DESCRIPTORS: ACHING;CRAMPING;DULL

## 2023-05-26 ASSESSMENT — PAIN SCALES - GENERAL
PAINLEVEL_OUTOF10: 6
PAINLEVEL_OUTOF10: 4
PAINLEVEL_OUTOF10: 7

## 2023-05-26 ASSESSMENT — PAIN DESCRIPTION - FREQUENCY: FREQUENCY: CONTINUOUS

## 2023-05-26 ASSESSMENT — PAIN DESCRIPTION - ONSET: ONSET: ON-GOING

## 2023-05-26 ASSESSMENT — PAIN DESCRIPTION - PAIN TYPE: TYPE: ACUTE PAIN

## 2023-05-26 NOTE — PLAN OF CARE
Problem: Chronic Conditions and Co-morbidities  Goal: Patient's chronic conditions and co-morbidity symptoms are monitored and maintained or improved  5/26/2023 1116 by Monique Jacome RN  Outcome: Progressing  5/26/2023 0150 by James Stallworth RN  Outcome: Progressing     Problem: Pain  Goal: Verbalizes/displays adequate comfort level or baseline comfort level  5/26/2023 1116 by Monique Jacome RN  Outcome: Progressing  5/26/2023 0150 by James Stallworth RN  Outcome: Progressing

## 2023-05-26 NOTE — OP NOTE
[address]                                OPERATIVE REPORT    PATIENT NAME: Sayra Naranjo                    :        1980  MED REC NO:   [id number]                            ROOM:       0423  ACCOUNT NO:   [de-identified]                           ADMIT DATE: 2023  PROVIDER:     Nevaeh Vieira MD    DATE OF PROCEDURE:  2023    PREOPERATIVE DIAGNOSIS:  GI blood loss anemia. POSTOPERATIVE DIAGNOSIS:  Normal status post bariatric surgery (gastric  bypass). OPERATION PERFORMED:  EGD. SURGEON:  Nevaeh Vieira MD    INDICATIONS:  The patient is a 44-year-old. She is seen for possible GI  blood loss anemia. ASA CLASSIFICATION:  III. CONSENT:  Informed consent. OPERATIVE PROCEDURE:  The Olympus video upper endoscope was introduced  through the mouth. The esophagus, the gastric remnant, and proximal  small bowel were inspected and special attention to the anastomosis. No  ulcers seen. Endoscope withdrawn. Estimated blood loss, none.         Jerry Rodriguez MD    D: 2023 11:00:19       T: 2023 14:25:01     [email or web address]  Job#: [id number]     Doc#: [id number]    CC:

## 2023-05-27 VITALS
TEMPERATURE: 99.1 F | HEIGHT: 64 IN | HEART RATE: 79 BPM | OXYGEN SATURATION: 98 % | BODY MASS INDEX: 37.56 KG/M2 | SYSTOLIC BLOOD PRESSURE: 139 MMHG | RESPIRATION RATE: 20 BRPM | WEIGHT: 220 LBS | DIASTOLIC BLOOD PRESSURE: 80 MMHG

## 2023-05-27 LAB
ALBUMIN SERPL-MCNC: 3.5 G/DL (ref 3.5–5.2)
ALP SERPL-CCNC: 97 U/L (ref 35–104)
ALT SERPL-CCNC: 14 U/L (ref 0–32)
ANION GAP SERPL CALCULATED.3IONS-SCNC: 10 MMOL/L (ref 7–16)
ANISOCYTOSIS: ABNORMAL
AST SERPL-CCNC: 17 U/L (ref 0–31)
BACTERIA URNS QL MICRO: ABNORMAL /HPF
BASOPHILS # BLD: 0 E9/L (ref 0–0.2)
BASOPHILS NFR BLD: 0 % (ref 0–2)
BILIRUB DIRECT SERPL-MCNC: <0.2 MG/DL (ref 0–0.3)
BILIRUB INDIRECT SERPL-MCNC: ABNORMAL MG/DL (ref 0–1)
BILIRUB SERPL-MCNC: 0.3 MG/DL (ref 0–1.2)
BILIRUB UR QL STRIP: NEGATIVE
BUN SERPL-MCNC: 5 MG/DL (ref 6–20)
CALCIUM SERPL-MCNC: 8.6 MG/DL (ref 8.6–10.2)
CHLORIDE SERPL-SCNC: 111 MMOL/L (ref 98–107)
CLARITY UR: ABNORMAL
CO2 SERPL-SCNC: 19 MMOL/L (ref 22–29)
COLOR UR: ABNORMAL
CREAT SERPL-MCNC: 0.8 MG/DL (ref 0.5–1)
EOSINOPHIL # BLD: 0 E9/L (ref 0.05–0.5)
EOSINOPHIL NFR BLD: 0 % (ref 0–6)
ERYTHROCYTE [DISTWIDTH] IN BLOOD BY AUTOMATED COUNT: 24 FL (ref 11.5–15)
GLUCOSE SERPL-MCNC: 89 MG/DL (ref 74–99)
GLUCOSE UR STRIP-MCNC: NEGATIVE MG/DL
HBA1C MFR BLD HPLC: 5.3 %
HCT VFR BLD AUTO: 27.1 % (ref 34–48)
HCT VFR BLD AUTO: 29.1 % (ref 34–48)
HGB BLD-MCNC: 8.1 G/DL (ref 11.5–15.5)
HGB BLD-MCNC: 8.4 G/DL (ref 11.5–15.5)
HGB UR QL STRIP: ABNORMAL
HYPOCHROMIA: ABNORMAL
KETONES UR STRIP-MCNC: NEGATIVE MG/DL
LEUKOCYTE ESTERASE UR QL STRIP: ABNORMAL
LYMPHOCYTES # BLD: 0.95 E9/L (ref 1.5–4)
LYMPHOCYTES NFR BLD: 15 % (ref 20–42)
MAGNESIUM SERPL-MCNC: 2 MG/DL (ref 1.6–2.6)
MCH RBC QN AUTO: 20.6 PG (ref 26–35)
MCHC RBC AUTO-ENTMCNC: 29.9 % (ref 32–34.5)
MCV RBC AUTO: 69 FL (ref 80–99.9)
MONOCYTES # BLD: 0.19 E9/L (ref 0.1–0.95)
MONOCYTES NFR BLD: 3 % (ref 2–12)
NEUTROPHILS # BLD: 5.17 E9/L (ref 1.8–7.3)
NEUTS SEG NFR BLD: 82 % (ref 43–80)
NITRITE UR QL STRIP: POSITIVE
OVALOCYTES: ABNORMAL
PH UR STRIP: 7 [PH] (ref 5–9)
PHOSPHATE SERPL-MCNC: 3.2 MG/DL (ref 2.5–4.5)
PLATELET # BLD AUTO: 330 E9/L (ref 130–450)
PMV BLD AUTO: 9.7 FL (ref 7–12)
POIKILOCYTES: ABNORMAL
POLYCHROMASIA: ABNORMAL
POTASSIUM SERPL-SCNC: 3.9 MMOL/L (ref 3.5–5)
PROT SERPL-MCNC: 6.2 G/DL (ref 6.4–8.3)
PROT UR STRIP-MCNC: 30 MG/DL
RBC # BLD AUTO: 3.93 E12/L (ref 3.5–5.5)
RBC #/AREA URNS HPF: >20 /HPF (ref 0–2)
SODIUM SERPL-SCNC: 140 MMOL/L (ref 132–146)
SP GR UR STRIP: 1.01 (ref 1–1.03)
TEAR DROP CELLS: ABNORMAL
TRICHOMONAS #/AREA URNS HPF: PRESENT /HPF
UROBILINOGEN UR STRIP-ACNC: 0.2 E.U./DL
WBC # BLD: 6.3 E9/L (ref 4.5–11.5)
WBC #/AREA URNS HPF: ABNORMAL /HPF (ref 0–5)

## 2023-05-27 PROCEDURE — 85025 COMPLETE CBC W/AUTO DIFF WBC: CPT

## 2023-05-27 PROCEDURE — 2580000003 HC RX 258

## 2023-05-27 PROCEDURE — 6370000000 HC RX 637 (ALT 250 FOR IP): Performed by: NURSE PRACTITIONER

## 2023-05-27 PROCEDURE — 83735 ASSAY OF MAGNESIUM: CPT

## 2023-05-27 PROCEDURE — 6360000002 HC RX W HCPCS: Performed by: INTERNAL MEDICINE

## 2023-05-27 PROCEDURE — 6370000000 HC RX 637 (ALT 250 FOR IP): Performed by: INTERNAL MEDICINE

## 2023-05-27 PROCEDURE — 6360000002 HC RX W HCPCS

## 2023-05-27 PROCEDURE — 85018 HEMOGLOBIN: CPT

## 2023-05-27 PROCEDURE — 80076 HEPATIC FUNCTION PANEL: CPT

## 2023-05-27 PROCEDURE — 85014 HEMATOCRIT: CPT

## 2023-05-27 PROCEDURE — 6370000000 HC RX 637 (ALT 250 FOR IP)

## 2023-05-27 PROCEDURE — 80048 BASIC METABOLIC PNL TOTAL CA: CPT

## 2023-05-27 PROCEDURE — 84100 ASSAY OF PHOSPHORUS: CPT

## 2023-05-27 PROCEDURE — 2500000003 HC RX 250 WO HCPCS: Performed by: INTERNAL MEDICINE

## 2023-05-27 PROCEDURE — 6370000000 HC RX 637 (ALT 250 FOR IP): Performed by: FAMILY MEDICINE

## 2023-05-27 PROCEDURE — 36415 COLL VENOUS BLD VENIPUNCTURE: CPT

## 2023-05-27 PROCEDURE — 81001 URINALYSIS AUTO W/SCOPE: CPT

## 2023-05-27 RX ORDER — METRONIDAZOLE 500 MG/1
500 TABLET ORAL EVERY 8 HOURS SCHEDULED
Status: DISCONTINUED | OUTPATIENT
Start: 2023-05-27 | End: 2023-05-27 | Stop reason: HOSPADM

## 2023-05-27 RX ORDER — CEFTRIAXONE 1 G/1
1000 INJECTION, POWDER, FOR SOLUTION INTRAMUSCULAR; INTRAVENOUS ONCE
Status: DISCONTINUED | OUTPATIENT
Start: 2023-05-27 | End: 2023-05-27 | Stop reason: CLARIF

## 2023-05-27 RX ORDER — METRONIDAZOLE 500 MG/1
500 TABLET ORAL EVERY 8 HOURS SCHEDULED
Qty: 21 TABLET | Refills: 0 | Status: SHIPPED | OUTPATIENT
Start: 2023-05-27 | End: 2023-06-03

## 2023-05-27 RX ADMIN — SODIUM CHLORIDE 125 MG: 9 INJECTION, SOLUTION INTRAVENOUS at 10:56

## 2023-05-27 RX ADMIN — METRONIDAZOLE 500 MG: 500 TABLET ORAL at 14:33

## 2023-05-27 RX ADMIN — ACETAMINOPHEN 650 MG: 325 TABLET ORAL at 10:16

## 2023-05-27 RX ADMIN — LIDOCAINE HYDROCHLORIDE 1000 MG: 10 INJECTION, SOLUTION EPIDURAL; INFILTRATION; INTRACAUDAL; PERINEURAL at 14:33

## 2023-05-27 RX ADMIN — CETIRIZINE HYDROCHLORIDE 10 MG: 10 TABLET, FILM COATED ORAL at 10:16

## 2023-05-27 RX ADMIN — FLUCONAZOLE 200 MG: 100 TABLET ORAL at 10:16

## 2023-05-27 RX ADMIN — LISINOPRIL 10 MG: 10 TABLET ORAL at 10:16

## 2023-05-27 ASSESSMENT — PAIN SCALES - GENERAL
PAINLEVEL_OUTOF10: 0
PAINLEVEL_OUTOF10: 1

## 2023-05-27 ASSESSMENT — PAIN - FUNCTIONAL ASSESSMENT: PAIN_FUNCTIONAL_ASSESSMENT: ACTIVITIES ARE NOT PREVENTED

## 2023-05-27 NOTE — PLAN OF CARE
Problem: Chronic Conditions and Co-morbidities  Goal: Patient's chronic conditions and co-morbidity symptoms are monitored and maintained or improved  Outcome: Progressing     Problem: Pain  Goal: Verbalizes/displays adequate comfort level or baseline comfort level  Outcome: Adequate for Discharge

## 2023-05-27 NOTE — PROGRESS NOTES
5/27/2023  [id number]  [id number]      Patient unavailable for physical therapy treatment due to declined at this time stating she does not need PT because she has been getting up multiple times to bathroom without difficulty.          Maude Galvan, [facility]  LIC# [id number]
Department of Family Practice  Adult Daily Progress Note      SUBJECTIVE  KILLIAN IS SEEN IN FOLLOW UP. SHE HAD HER EGD TODAY. REPORT NOT AVAILABLE, BUT SHE SAID NOTHING WAS FOUND. SHE IS TO HAVE TESTING TOMORROW WITH [facility].   THE ORDERS INDICATE A BLEEDING SCAN. HGB IS 7.1 TODAY. OBJECTIVE    Physical  VITALS:  /79   Pulse 86   Temp 98.6 °F (37 °C) (Oral)   Resp 16   Ht 5' 4\" (1.626 m)   Wt 220 lb (99.8 kg)   LMP 05/03/2023   SpO2 100%   BMI 37.76 kg/m²   CONSTITUTIONAL:  awake, alert, cooperative, no apparent distress, and appears stated age  ENT:  Normocephalic, without obvious abnormality, atraumatic, sinuses nontender on palpation, external ears without lesions, oral pharynx with moist mucus membranes, tonsils without erythema or exudates, gums normal and good dentition.   LUNGS:  No increased work of breathing, good air exchange, clear to auscultation bilaterally, no crackles or wheezing  CARDIOVASCULAR:  Normal apical impulse, regular rate and rhythm, normal S1 and S2, no S3 or S4, and no murmur noted  ABDOMEN:  scars CONSISTENT WITH SURGICAL HISTORY, normal bowel sounds, soft, non-distended, non-tender, no masses palpated, no hepatosplenomegally  SKIN:  no bruising or bleeding, normal skin color, texture, turgor, no redness, warmth, or swelling, and no rashes  Data  CBC with Differential:    Lab Results   Component Value Date/Time    WBC 6.1 05/25/2023 06:47 AM    RBC 3.76 05/25/2023 06:47 AM    HGB 7.1 05/25/2023 02:37 PM    HCT 25.8 05/25/2023 02:37 PM     05/25/2023 06:47 AM    MCV 67.6 05/25/2023 06:47 AM    MCH 19.4 05/25/2023 06:47 AM    MCHC 28.7 05/25/2023 06:47 AM    RDW 21.4 05/25/2023 06:47 AM    NRBC 0.9 05/25/2023 06:47 AM    LYMPHOPCT 14.8 05/25/2023 06:47 AM    MONOPCT 3.5 05/25/2023 06:47 AM    BASOPCT 0.9 05/25/2023 06:47 AM    MONOSABS 0.24 05/25/2023 06:47 AM    LYMPHSABS 0.92 05/25/2023 06:47 AM    EOSABS 0.00 05/25/2023 06:47 AM    BASOSABS 0.05 05/25/2023 06:47
GENERAL SURGERY  DAILY PROGRESS NOTE  5/26/2023  Chief Complaint   Patient presents with    Discuss Labs     Sent from Dr Emily Castillo office for low hemoglobin          Subjective:  No abdominal pain. Patient states she is having brown stool. Objective:  /88   Pulse 74   Temp 97.6 °F (36.4 °C) (Oral)   Resp 20   Ht 5' 4\" (1.626 m)   Wt 220 lb (99.8 kg)   LMP 05/03/2023   SpO2 100%   BMI 37.76 kg/m²     GENERAL:  Laying in bed, awake, alert, cooperative, no apparent distress  HEAD: Normocephalic, atraumatic  EYES: No sclera icterus, pupils equal  LUNGS:  No increased work of breathing  CARDIOVASCULAR:  regular rate  ABDOMEN:  Soft, non-tender, non-distended  EXTREMITIES: No edema or swelling  SKIN: Warm and dry, no rashes or lesions    Assessment/Plan:  36 y.o. female with a history of a Shannon-en-Y gastric bypass, symptomatic anemia, and multiple weeks of melena    Continue PPI/Carafate  GI consulted-EGD performed yesterday 5/25 showed no evidence of upper GI source.   They are planning for ablation today  Patient made CTA to evaluate gastric remnant    Electronically signed by Reginaldo Chin MD on 5/26/2023 at 7:48 AM
Internal Medicine Progress Note    [facility]    Johanna Loss. Bubba Dandy., CRISO.I. Gilford Lah, D.O., ISELA Bledsoe D.O. Jay Borges, MSN, APRN, NP-C  Zaid Willson. Alberto Bartlett, MSN, APRN-CNP  Angela Lennon, MSN, APRN-CNP     Primary Care Physician: Angi Quinn DO   Admitting Physician:  Angi Quinn DO  Admission date and time: 5/24/2023 10:25 AM    Room:  [facility]2897-  Admitting diagnosis: GI bleed [K92.2]  Gastrointestinal hemorrhage, unspecified gastrointestinal hemorrhage type [K92.2]  Anemia, unspecified type [D64.9]    Patient Name: Torie Vanegas  MRN: [id number]    Date of Service: 5/25/2023     Subjective:  Kimberley Lewis is a 36 y.o. female who was seen and examined today,5/25/2023, at the bedside. Patient appeared to be resting comfortable at the present time. Continue to observe hemoglobin hematocrit. Endoscopy planned    No family present during my examination. Review of System:   Constitutional:   Denies fever or chills, weight loss or gain, fatigue or malaise. HEENT:   Denies ear pain, sore throat, sinus or eye problems. Cardiovascular:   Denies any chest pain, irregular heartbeats, or palpitations. Respiratory:   Denies shortness of breath, coughing, sputum production, hemoptysis, or wheezing. Gastrointestinal:   Complains of slight nausea  Genitourinary:    Denies any urgency, frequency, hematuria. Voiding  without difficulty. Extremities:   Denies lower extremity swelling, edema or cyanosis. Neurology:    Denies any headache or focal neurological deficits, Denies generalized weakness or memory difficulty. Psch:   Denies being anxious or depressed. Musculoskeletal:    Denies  myalgias, joint complaints or back pain. Integumentary:   Denies any rashes, ulcers, or excoriations. Denies bruising. Hematologic/Lymphatic:  Denies bruising or bleeding.     Physical Exam:  I/O
Internal Medicine Progress Note    [facility]    Nakul Junior. Suzette Nava., FILEMON.NADER.HEIDEOBryceIBryce Porter D.O., STEW Ashley D.O. Cristy Bedoya D.O. Dallas Lott, MSN, APRN, NP-C  Sylvester Mckoy. Freedom Huang, MSN, APRN-CNP  Brennen Clark, MSN, APRN-CNP     Primary Care Physician: Mark Prado DO   Admitting Physician:  Mark Prado DO  Admission date and time: 5/24/2023 10:25 AM    Room:  [address] [place] [address]  Admitting diagnosis: GI bleed [K92.2]  Gastrointestinal hemorrhage, unspecified gastrointestinal hemorrhage type [K92.2]  Anemia, unspecified type [D64.9]    Patient Name: Jacqueline Lancaster  MRN: [id number]    Date of Service: 5/26/2023     Subjective:  Juliette Mccord is a 36 y.o. female who was seen and examined today,5/26/2023, at the bedside. Patient seen in nuclear medicine for nuclear scan. Still complaining some abdominal discomfort. Does relate to having slight cough with headache      No family present during my examination. Review of System:   Constitutional:   Denies fever or chills, weight loss or gain, fatigue or malaise. HEENT:   Denies ear pain, sore throat, sinus or eye problems. Planes of headache  Cardiovascular:   Denies any chest pain, irregular heartbeats, or palpitations. Respiratory:   Denies shortness of breath,  sputum production, hemoptysis, or wheezing. Complains of slight cough  Gastrointestinal:   Complains of slight nausea  Genitourinary:    Denies any urgency, frequency, hematuria. Voiding  without difficulty. Extremities:   Denies lower extremity swelling, edema or cyanosis. Neurology:    Denies any headache or focal neurological deficits, Denies generalized weakness or memory difficulty. Psch:   Denies being anxious or depressed. Musculoskeletal:    Denies  myalgias, joint complaints or back pain. Integumentary:   Denies any rashes, ulcers, or excoriations.   Denies
Ok to be d/c per Dr. Conti Sers
PROGRESS NOTE  By Cassandra Kwan M.D. [facility]  Dr. Miriam Sims M.D.,  Dr. Karol Harding M.D.,   Dr. Guido Ochoa D.O.,  Dr. Zhanna Rojas M.D.,  Dr. Reynaldo Castro D.O.,          Julia Mishra  36 y.o.  female    SUBJECTIVE:  No new complaints    OBJECTIVE:    BP (!) 145/60   Pulse 81   Temp 98.4 °F (36.9 °C) (Oral)   Resp 18   Ht 5' 4\" (1.626 m)   Wt 220 lb (99.8 kg)   LMP 05/03/2023   SpO2 98%   BMI 37.76 kg/m²     General: NAD/obese -American female  HEENT: Anicteric sclera/moist oral mucosa  Neck: Supple/trachea midline  Chest: Symmetric excursion/nonlabored respirations  Cor: Regular  Abd.: Soft and obese. Nontender  Extr.:  No peripheral edema  Skin: Warm and dry/anicteric        DATA:    Monitor data reviewed -sinus rhythm noted.        Lab Results   Component Value Date/Time    WBC 6.0 05/26/2023 06:46 AM    RBC 3.91 05/26/2023 06:46 AM    HGB 7.9 05/26/2023 06:46 AM    HCT 27.1 05/26/2023 06:46 AM    MCV 69.3 05/26/2023 06:46 AM    MCH 20.2 05/26/2023 06:46 AM    MCHC 29.2 05/26/2023 06:46 AM    RDW 24.7 05/26/2023 06:46 AM     05/26/2023 06:46 AM    MPV 10.4 05/26/2023 06:46 AM     Lab Results   Component Value Date/Time     05/26/2023 06:46 AM    K 4.0 05/26/2023 06:46 AM    K 4.0 01/08/2021 11:44 AM     05/26/2023 06:46 AM    CO2 18 05/26/2023 06:46 AM    BUN 7 05/26/2023 06:46 AM    CREATININE 0.8 05/26/2023 06:46 AM    CALCIUM 8.5 05/26/2023 06:46 AM    PROT 6.2 05/26/2023 06:46 AM    LABALBU 3.6 05/26/2023 06:46 AM    BILITOT 0.6 05/26/2023 06:46 AM    ALKPHOS 97 05/26/2023 06:46 AM    AST 18 05/26/2023 06:46 AM    ALT 16 05/26/2023 06:46 AM     No results found for: LIPASE  No results found for: AMYLASE      ASSESSMENT/PLAN:  Patient Active Problem List   Diagnosis    S/P gastric bypass    Gastroesophageal reflux disease without esophagitis    Marginal ulcer    Hiatal hernia    COVID    GI bleed    Primary hypertension    Obesity (BMI
PROGRESS NOTE  By Ye Welch M.D. [facility]  Dr. Radha Blackmon M.D.,  Dr. Nahum Tanner M.D.,   Dr. Cruz Calvo D.O.,  Dr. Estephania Amaral M.D.,  Dr. Dianna Pak D.O.,          Lola Shaikh  36 y.o.  female    SUBJECTIVE:  No new complaint.   No significant abdominal pain    OBJECTIVE:    /80   Pulse 79   Temp 99.1 °F (37.3 °C)   Resp 20   Ht 5' 4\" (1.626 m)   Wt 220 lb (99.8 kg)   LMP 05/03/2023   SpO2 98%   BMI 37.76 kg/m²     General: NAD/-American female  HEENT: Anicteric sclera/moist oral mucosa  Neck: Supple/trachea midline  Chest: Symmetric excursion/nonlabored respirations  Cor: Regular  Abd.: Soft and obese  Extr.:  No peripheral edema  Skin: Warm and dry      DATA:     Lab Results   Component Value Date/Time    WBC 6.3 05/27/2023 03:19 AM    RBC 3.93 05/27/2023 03:19 AM    HGB 8.4 05/27/2023 09:24 AM    HCT 29.1 05/27/2023 09:24 AM    MCV 69.0 05/27/2023 03:19 AM    MCH 20.6 05/27/2023 03:19 AM    MCHC 29.9 05/27/2023 03:19 AM    RDW 24.0 05/27/2023 03:19 AM     05/27/2023 03:19 AM    MPV 9.7 05/27/2023 03:19 AM     Lab Results   Component Value Date/Time     05/27/2023 03:19 AM    K 3.9 05/27/2023 03:19 AM    K 4.0 01/08/2021 11:44 AM     05/27/2023 03:19 AM    CO2 19 05/27/2023 03:19 AM    BUN 5 05/27/2023 03:19 AM    CREATININE 0.8 05/27/2023 03:19 AM    CALCIUM 8.6 05/27/2023 03:19 AM    PROT 6.2 05/27/2023 03:19 AM    LABALBU 3.5 05/27/2023 03:19 AM    BILITOT 0.3 05/27/2023 03:19 AM    ALKPHOS 97 05/27/2023 03:19 AM    AST 17 05/27/2023 03:19 AM    ALT 14 05/27/2023 03:19 AM     No results found for: LIPASE  No results found for: AMYLASE      ASSESSMENT/PLAN:  Patient Active Problem List   Diagnosis    S/P gastric bypass    Gastroesophageal reflux disease without esophagitis    Marginal ulcer    Hiatal hernia    COVID    GI bleed    Primary hypertension    Obesity (BMI 35.0-39.9 without comorbidity)    Iron deficiency anemia due to
Physical Therapy    Room #:   3988/7761-98    Date: 2023       Patient Name: Ann Marie Ferreira  : 1980      MRN: [id number]     Patient unavailable for physical therapy treatment due to  nursing reporting patient is going down for scope . Will attempt PT treatment at a later time. Thank you.       Helen Garza, PT
Physical Therapy  Physical Therapy    Room #:   3460/1543-73    Date: 2023       Patient Name: Sage Hatch  : 1980      MRN: [id number]     Patient unavailable for physical therapy treatment due to off floor at CT. Will attempt PT treatment tomorrow. Thank you. Jose J Steen  [facility]  LIC # [id number]
Physical Therapy Initial Evaluation/Plan of Care    Room #:  0423/0423-02  Patient Name: Louise Farris  YOB: 1980  MRN: [id number]    Date of Service: 5/25/2023     Tentative placement recommendation: Home with no Physical Therapy needs  Equipment recommendation: To be determined      Evaluating Physical Therapist: Codi Cole #[id number]      Specific Provider Orders/Date/Referring Provider :   05/24/23 1330    PT evaluation and treat  Start:  05/24/23 1330,   End:  05/24/23 1330,   ONE TIME,   Standing Count:  1 Occurrences,   R         Terry Lorenzana, APRN - CNP     Admitting Diagnosis:   GI bleed [K92.2]  Gastrointestinal hemorrhage, unspecified gastrointestinal hemorrhage type [K92.2]  Anemia, unspecified type [D64.9]      Surgery:     Date:  5/25/2023              Surgeon: Phylliss Cranker):  Heydi Rios MD     Procedure: Procedure(s):  EGD ESOPHAGOGASTRODUODENOSCOPY      Visit Diagnoses         Codes    Anemia, unspecified type     D64.9            Patient Active Problem List   Diagnosis    S/P gastric bypass    Gastroesophageal reflux disease without esophagitis    Marginal ulcer    Hiatal hernia    COVID    GI bleed    Primary hypertension    Obesity (BMI 35.0-39.9 without comorbidity)    Iron deficiency anemia due to chronic blood loss        ASSESSMENT of Current Deficits Patient exhibits decreased balance and endurance impairing gait distance and tolerance to activity. Pt ambulated to bathroom to transport cart with supervision. The patient will benefit from continued skilled therapy to increase strength and improve balance for safe functional mobility, to decrease risk of falls, and to meet goals at discharge.          PHYSICAL THERAPY  PLAN OF CARE       Physical therapy plan of care is established based on physician order,  patient diagnosis and clinical assessment    Current Treatment Recommendations:    -Bed Mobility: Lower extremity exercises , Upper extremity exercises , and Trunk
Physician Progress Note      PATIENT:               Carmita Alcala  CSN #:                  [id number]  :                       1980  ADMIT DATE:       2023 10:25 AM  [address] [place] DATE:  Sabine Moore  PROVIDER #:        Christine Carlson DO          QUERY TEXT:    Pt admitted with upper GI bleed and has anemia documented. If possible, please   document in progress notes and discharge summary further specificity   regarding the acuity and type of anemia:    The medical record reflects the following:  Risk Factors: GIB, gastric bypass  Clinical Indicators:  consult note \"Melena secondary to upper GI bleeding   with prior history of gastric bypass Acute symptomatic anemia secondary to   #1\", H&P notes \"Iron deficiency anemia due to chronic blood loss\"  Treatment: GI consult, blood transfusion, H&H. Thank you,  Shaquille Muñiz, ALICIAN, RN, CDS  [phone]  Options provided:  -- Anemia due to acute on chronic blood loss  -- Anemia due to chronic blood loss  -- Other - I will add my own diagnosis  -- Disagree - Not applicable / Not valid  -- Disagree - Clinically unable to determine / Unknown  -- Refer to Clinical Documentation Reviewer    PROVIDER RESPONSE TEXT:    This patient has acute on chronic blood loss anemia.     Query created by: Grecia Srinivasan on 2023 10:35 AM      Electronically signed by:  Christine Carlson DO 2023 4:25 PM
Pt to PACU for recovery. SBAR filled out and nurse to nurse report given at bedside.
06:46 AM    MONOSABS 0.12 05/26/2023 06:46 AM    LYMPHSABS 1.02 05/26/2023 06:46 AM    EOSABS 0.11 05/26/2023 06:46 AM    BASOSABS 0.00 05/26/2023 06:46 AM     BMP:    Lab Results   Component Value Date/Time     05/26/2023 06:46 AM    K 4.0 05/26/2023 06:46 AM    K 4.0 01/08/2021 11:44 AM     05/26/2023 06:46 AM    CO2 18 05/26/2023 06:46 AM    BUN 7 05/26/2023 06:46 AM    LABALBU 3.6 05/26/2023 06:46 AM    CREATININE 0.8 05/26/2023 06:46 AM    CALCIUM 8.5 05/26/2023 06:46 AM    GFRAA >60 08/31/2021 07:56 AM    LABGLOM >60 05/26/2023 06:46 AM    GLUCOSE 76 05/26/2023 06:46 AM     Current Medications  Scheduled Meds:   ferric gluconate (FERRLECIT) IVPB  125 mg IntraVENous Daily    fluconazole  200 mg Oral Daily    cetirizine  10 mg Oral Daily    lisinopril  10 mg Oral Daily    sodium chloride flush  5-40 mL IntraVENous 2 times per day     Continuous Infusions:   sodium chloride      sodium chloride      0.9% NaCl with KCl 40 mEq 75 mL/hr at 05/26/23 2144    sodium chloride       PRN Meds:.technetium labeled red blood cells, sodium chloride, sodium chloride, magnesium sulfate, sodium phosphate IVPB **OR** sodium phosphate IVPB, potassium chloride **OR** potassium alternative oral replacement **OR** potassium chloride, sodium chloride flush, sodium chloride, ondansetron **OR** ondansetron, acetaminophen **OR** acetaminophen    ASSESSMENT AND PLAN      Principal Problem:    GI bleed  Active Problems:    Primary hypertension    Obesity (BMI 35.0-39.9 without comorbidity)    Iron deficiency anemia due to chronic blood loss  Resolved Problems:    * No resolved hospital problems. *      CONTINUE PLAN OF CARE.
therapist facilitated bed mobility (sit>supine to transport bed) with safety education slow transitions. Therapist facilitated education/skilled cuing for modified strategies, body mechanics, and energy conservation (self-pacing, incorporating rest break, prioritizing the day, and pursed lip breathing) when completing functional mobility/ADLs. Skilled monitoring of HR WFL and SpO2 93-95% on room air and pt's response to treatment. Rehab Potential: Good for established goals     Patient / Family Goal: To return home      Patient and/or family were instructed on functional diagnosis, prognosis/goals and OT plan of care. Demonstrated good understanding. Eval Complexity: Low    Time In: 8:53  Time Out: 9:02  Total Treatment Time: N/A    Min Units   OT Eval Low 97165  x  1   OT Eval Medium 44886      OT Eval High 96252      OT Re-Eval [id number]       Therapeutic Ex [id number]       Therapeutic Activities 48427       ADL/Self Care 22598       Orthotic Management 86916       Manual 66897     Neuro Re-Ed 48221       Non-Billable Time          Evaluation Time additionally includes thorough review of current medical information, gathering information on past medical history/social history and prior level of function, interpretation of standardized testing/informal observation of tasks, assessment of data and development of plan of care and goals.         Laney Stewart, S/OT  Seema OTR/L #[id number]

## 2023-07-24 ENCOUNTER — TELEPHONE (OUTPATIENT)
Dept: BARIATRICS/WEIGHT MGMT | Age: 43
End: 2023-07-24

## 2023-07-24 NOTE — TELEPHONE ENCOUNTER
Patient wants to make appt to see Dr. Leatha Hernandez, says she missed her last appt in the office with him due to going to the hospital.  She states she wants to discuss having surgery again.   Will not agree to seeing NP. WDL

## 2023-08-05 ENCOUNTER — HOSPITAL ENCOUNTER (OUTPATIENT)
Age: 43
Discharge: HOME | End: 2023-08-05
Payer: COMMERCIAL

## 2023-08-05 ENCOUNTER — HOSPITAL ENCOUNTER (OUTPATIENT)
Age: 43
End: 2023-08-05
Payer: COMMERCIAL

## 2023-08-05 LAB
ALBUMIN SERPL-MCNC: 4 G/DL (ref 3.5–5.2)
ALP SERPL-CCNC: 120 U/L (ref 35–104)
ALT SERPL-CCNC: 20 U/L (ref 0–32)
ANION GAP SERPL CALCULATED.3IONS-SCNC: 11 MMOL/L (ref 7–16)
AST SERPL-CCNC: 23 U/L (ref 0–31)
BASOPHILS # BLD: 0.04 K/UL (ref 0–0.2)
BASOPHILS NFR BLD: 1 % (ref 0–2)
BILIRUB SERPL-MCNC: 0.2 MG/DL (ref 0–1.2)
BUN SERPL-MCNC: 14 MG/DL (ref 6–20)
CALCIUM SERPL-MCNC: 8.6 MG/DL (ref 8.6–10.2)
CHLORIDE SERPL-SCNC: 109 MMOL/L (ref 98–107)
CO2 SERPL-SCNC: 20 MMOL/L (ref 22–29)
CREAT SERPL-MCNC: 0.7 MG/DL (ref 0.5–1)
EOSINOPHIL # BLD: 0.08 K/UL (ref 0.05–0.5)
EOSINOPHILS RELATIVE PERCENT: 2 % (ref 0–6)
ERYTHROCYTE [DISTWIDTH] IN BLOOD BY AUTOMATED COUNT: 18.7 % (ref 12–16)
GFR SERPL CREATININE-BSD FRML MDRD: >60 ML/MIN/1.73M2
GLUCOSE SERPL-MCNC: 93 MG/DL (ref 74–99)
HCT VFR BLD AUTO: 31.2 % (ref 34–48)
HGB BLD-MCNC: 10.4 G/DL (ref 11.5–15.5)
IMM GRANULOCYTES # BLD AUTO: <0.03 K/UL (ref 0–0.58)
IMM GRANULOCYTES NFR BLD: 0 % (ref 0–5)
LYMPHOCYTES NFR BLD: 1.52 K/UL (ref 1.5–4)
LYMPHOCYTES RELATIVE PERCENT: 36 % (ref 20–42)
MCH RBC QN AUTO: 26.4 PG (ref 26–35)
MCHC RBC AUTO-ENTMCNC: 33.3 G/DL (ref 32–34.5)
MCV RBC AUTO: 79.2 FL (ref 80–99.9)
MONOCYTES NFR BLD: 0.65 K/UL (ref 0.1–0.95)
MONOCYTES NFR BLD: 15 % (ref 2–12)
NEUTROPHILS NFR BLD: 46 % (ref 43–80)
NEUTS SEG NFR BLD: 1.92 K/UL (ref 1.8–7.3)
PLATELET # BLD AUTO: 305 K/UL (ref 130–450)
PMV BLD AUTO: 8.9 FL (ref 7–12)
POTASSIUM SERPL-SCNC: 4.1 MMOL/L (ref 3.5–5)
PROT SERPL-MCNC: 6.8 G/DL (ref 6.4–8.3)
RBC # BLD AUTO: 3.94 M/UL (ref 3.5–5.5)
SODIUM SERPL-SCNC: 140 MMOL/L (ref 132–146)
WBC OTHER # BLD: 4.2 K/UL (ref 4.5–11.5)

## 2023-08-05 PROCEDURE — 36415 COLL VENOUS BLD VENIPUNCTURE: CPT

## 2023-08-05 PROCEDURE — 80053 COMPREHEN METABOLIC PANEL: CPT

## 2023-08-05 PROCEDURE — 85025 COMPLETE CBC W/AUTO DIFF WBC: CPT

## 2023-08-09 ENCOUNTER — OFFICE VISIT (OUTPATIENT)
Dept: BARIATRICS/WEIGHT MGMT | Age: 43
End: 2023-08-09
Payer: COMMERCIAL

## 2023-08-09 ENCOUNTER — PREP FOR PROCEDURE (OUTPATIENT)
Dept: BARIATRICS/WEIGHT MGMT | Age: 43
End: 2023-08-09

## 2023-08-09 ENCOUNTER — TELEPHONE (OUTPATIENT)
Dept: BARIATRICS/WEIGHT MGMT | Age: 43
End: 2023-08-09

## 2023-08-09 VITALS
WEIGHT: 242 LBS | BODY MASS INDEX: 41.32 KG/M2 | TEMPERATURE: 97.7 F | SYSTOLIC BLOOD PRESSURE: 168 MMHG | RESPIRATION RATE: 20 BRPM | HEIGHT: 64 IN | HEART RATE: 90 BPM | DIASTOLIC BLOOD PRESSURE: 111 MMHG

## 2023-08-09 DIAGNOSIS — K91.2 MALNUTRITION FOLLOWING GASTROINTESTINAL SURGERY: Primary | ICD-10-CM

## 2023-08-09 PROBLEM — R10.9 ABDOMINAL PAIN: Status: ACTIVE | Noted: 2023-08-09

## 2023-08-09 PROCEDURE — G8427 DOCREV CUR MEDS BY ELIG CLIN: HCPCS | Performed by: SURGERY

## 2023-08-09 PROCEDURE — 99214 OFFICE O/P EST MOD 30 MIN: CPT | Performed by: SURGERY

## 2023-08-09 PROCEDURE — 3080F DIAST BP >= 90 MM HG: CPT | Performed by: SURGERY

## 2023-08-09 PROCEDURE — 99211 OFF/OP EST MAY X REQ PHY/QHP: CPT

## 2023-08-09 PROCEDURE — G8417 CALC BMI ABV UP PARAM F/U: HCPCS | Performed by: SURGERY

## 2023-08-09 PROCEDURE — 1036F TOBACCO NON-USER: CPT | Performed by: SURGERY

## 2023-08-09 PROCEDURE — 3077F SYST BP >= 140 MM HG: CPT | Performed by: SURGERY

## 2023-08-09 NOTE — PATIENT INSTRUCTIONS
Please continue to take your vitamin and mineral supplements as instructed. If you received a blood work prescription today for laboratory monitoring due prior to your next routine follow-up visit, please have this blood work obtained 10 to 14 days prior to your next visit. It is important to fast for 12 hours prior to routine weight loss surgery blood work, EXCEPT for drinking water, to ensure accuracy of results. Please report nausea, vomiting, abdominal pain, or any other problems you experience to your surgeon. For problems related to weight loss surgery, it is best to go to [facility] Emergency Department and have your surgeon paged.

## 2023-08-09 NOTE — TELEPHONE ENCOUNTER
Prior Authorization Form  DEMOGRAPHICS:    Patient Name:  Janessa Moser  Patient :  1980            Insurance:  Payor: [address] [place] [address] / Plan: [address] [place] [address] / Product Type: *No Product type* /   Insurance ID Number:    Payer/Plan Subscr  Sex Relation Sub. Ins. ID Effective Group Num   1. [address] [employer] [employer] D 1980 Female Self [id number] 3/3/23                                    [address]   2.  [facility] D 1980 Female Self [id number] 1/1/15                                    [address]         DIAGNOSIS & PROCEDURE:    Procedure/Operation: diagnostic laparoscopy with reduction and closure of internal hernia and possible revision of GJ anastomosis           CPT Code: 25167    Diagnosis:  ABDOMINAL PAIN    ICD10 Code: R10.9    Location:  [address] [place] [address]    Surgeon:  Adalberto De Leon INFORMATION:    Date: 10-9-23    Time: UNKNOWN              Anesthesia:  General                                                       Status:  Outpatient        Special Comments:  NONE         Electronically signed by Gina Hutchins RN on 2023 at 3:44 PM

## 2023-08-15 RX ORDER — SODIUM CHLORIDE 0.9 % (FLUSH) 0.9 %
5-40 SYRINGE (ML) INJECTION PRN
Status: CANCELLED | OUTPATIENT
Start: 2023-08-15

## 2023-08-15 RX ORDER — SODIUM CHLORIDE 9 MG/ML
INJECTION, SOLUTION INTRAVENOUS PRN
Status: CANCELLED | OUTPATIENT
Start: 2023-08-15

## 2023-08-15 RX ORDER — SODIUM CHLORIDE 0.9 % (FLUSH) 0.9 %
5-40 SYRINGE (ML) INJECTION EVERY 12 HOURS SCHEDULED
Status: CANCELLED | OUTPATIENT
Start: 2023-08-15

## 2023-08-22 ENCOUNTER — TELEPHONE (OUTPATIENT)
Dept: BARIATRICS/WEIGHT MGMT | Age: 43
End: 2023-08-22

## 2023-08-22 NOTE — TELEPHONE ENCOUNTER
Prior Authorization Form  DEMOGRAPHICS:     Patient Name:  Dulce Trujillo  Patient :  1980                                                    Insurance:  Payor: [address] [place] [address] / Plan: [address] [place] [address] / Product Type: *No Product type* /   Insurance ID Number:    Payer/Plan Subscr  Sex Relation Sub. Ins. ID Effective Group Num   1. [address] D 1980 Female Self [id number] 3/3/23                                     [address]      2.  [address] [facility] D 1980 Female Self [id number] 1/1/15                                     [address]               DIAGNOSIS & PROCEDURE:     Procedure/Operation: diagnostic laparoscopy with reduction and closure of internal hernia and possible revision of GJ anastomosis                                                                                  CPT Code: 68781     Diagnosis:  ABDOMINAL PAIN     ICD10 Code: R10.9     Location:  [address] [place] [address]     Surgeon:  Jill Wilson INFORMATION:     Date: 10-10-23                        Time: UNKNOWN                   Anesthesia:  General                                                       Status:  Outpatient         Special Comments:  NONE

## 2023-08-30 ENCOUNTER — TRANSCRIBE ORDERS (OUTPATIENT)
Dept: ADMINISTRATIVE | Age: 43
End: 2023-08-30

## 2023-08-30 DIAGNOSIS — Z12.31 OTHER SCREENING MAMMOGRAM: Primary | ICD-10-CM

## 2023-08-31 ENCOUNTER — HOSPITAL ENCOUNTER (OUTPATIENT)
Dept: MAMMOGRAPHY | Age: 43
Discharge: HOME | End: 2023-09-02
Attending: OBSTETRICS & GYNECOLOGY
Payer: COMMERCIAL

## 2023-08-31 VITALS — WEIGHT: 242 LBS | BODY MASS INDEX: 41.32 KG/M2 | HEIGHT: 64 IN

## 2023-08-31 DIAGNOSIS — Z12.31 OTHER SCREENING MAMMOGRAM: ICD-10-CM

## 2023-08-31 PROCEDURE — 77063 BREAST TOMOSYNTHESIS BI: CPT

## 2023-09-23 ENCOUNTER — HOSPITAL ENCOUNTER (EMERGENCY)
Age: 43
Discharge: HOME | End: 2023-09-23
Attending: STUDENT IN AN ORGANIZED HEALTH CARE EDUCATION/TRAINING PROGRAM
Payer: COMMERCIAL

## 2023-09-23 VITALS
WEIGHT: 235 LBS | SYSTOLIC BLOOD PRESSURE: 172 MMHG | BODY MASS INDEX: 40.12 KG/M2 | RESPIRATION RATE: 16 BRPM | HEART RATE: 105 BPM | DIASTOLIC BLOOD PRESSURE: 103 MMHG | OXYGEN SATURATION: 100 % | HEIGHT: 64 IN | TEMPERATURE: 98.4 F

## 2023-09-23 DIAGNOSIS — S20.221A CONTUSION OF RIGHT SIDE OF BACK, INITIAL ENCOUNTER: Primary | ICD-10-CM

## 2023-09-23 PROCEDURE — 6360000002 HC RX W HCPCS: Performed by: STUDENT IN AN ORGANIZED HEALTH CARE EDUCATION/TRAINING PROGRAM

## 2023-09-23 PROCEDURE — 99284 EMERGENCY DEPT VISIT MOD MDM: CPT

## 2023-09-23 PROCEDURE — 96372 THER/PROPH/DIAG INJ SC/IM: CPT

## 2023-09-23 RX ORDER — METHYLPREDNISOLONE 4 MG/1
TABLET ORAL
Qty: 1 KIT | Refills: 0 | Status: SHIPPED | OUTPATIENT
Start: 2023-09-23 | End: 2023-09-23

## 2023-09-23 RX ORDER — DEXAMETHASONE SODIUM PHOSPHATE 10 MG/ML
10 INJECTION, SOLUTION INTRAMUSCULAR; INTRAVENOUS ONCE
Status: COMPLETED | OUTPATIENT
Start: 2023-09-23 | End: 2023-09-23

## 2023-09-23 RX ORDER — CYCLOBENZAPRINE HCL 5 MG
5 TABLET ORAL 2 TIMES DAILY PRN
Qty: 10 TABLET | Refills: 0 | Status: SHIPPED | OUTPATIENT
Start: 2023-09-23 | End: 2023-10-03

## 2023-09-23 RX ORDER — FLUCONAZOLE 150 MG/1
150 TABLET ORAL ONCE
Qty: 2 TABLET | Refills: 0 | Status: SHIPPED | OUTPATIENT
Start: 2023-09-23 | End: 2023-09-23

## 2023-09-23 RX ADMIN — DEXAMETHASONE SODIUM PHOSPHATE 10 MG: 10 INJECTION, SOLUTION INTRAMUSCULAR; INTRAVENOUS at 14:18

## 2023-09-23 ASSESSMENT — ENCOUNTER SYMPTOMS
DIARRHEA: 0
COUGH: 0
VOMITING: 0
ABDOMINAL PAIN: 0
COLOR CHANGE: 0
NAUSEA: 0
BACK PAIN: 1
SHORTNESS OF BREATH: 0

## 2023-09-23 ASSESSMENT — PAIN SCALES - GENERAL: PAINLEVEL_OUTOF10: 10

## 2023-09-23 ASSESSMENT — PAIN DESCRIPTION - LOCATION: LOCATION: GENERALIZED

## 2023-09-23 ASSESSMENT — PAIN - FUNCTIONAL ASSESSMENT: PAIN_FUNCTIONAL_ASSESSMENT: 0-10

## 2023-09-23 NOTE — ED PROVIDER NOTES
HPI   65-year-old female patient presenting to emergency department for evaluation of of right shoulder pain and back pain. Patient reporting that she had a fall a week ago on [birth year] birthday. She denies any head injury or loss of consciousness. No neck pain. No numbness or weakness. No chest pain no shortness of breath. No bowel or bladder incontinence. Review of Systems   Constitutional:  Negative for chills and fever. HENT:  Negative for congestion. Respiratory:  Negative for cough and shortness of breath. Cardiovascular:  Negative for chest pain. Gastrointestinal:  Negative for abdominal pain, diarrhea, nausea and vomiting. Genitourinary:  Negative for difficulty urinating, dysuria and hematuria. Musculoskeletal:  Positive for back pain. Right shoulder pain   Skin:  Negative for color change. All other systems reviewed and are negative. Physical Exam  Vitals and nursing note reviewed. Constitutional:       Appearance: Normal appearance. HENT:      Head: Normocephalic and atraumatic. Nose: Nose normal. No congestion. Mouth/Throat:      Mouth: Mucous membranes are moist.      Pharynx: Oropharynx is clear. Eyes:      Conjunctiva/sclera: Conjunctivae normal.      Pupils: Pupils are equal, round, and reactive to light. Cardiovascular:      Rate and Rhythm: Normal rate and regular rhythm. Pulses: Normal pulses. Heart sounds: Normal heart sounds. Pulmonary:      Effort: Pulmonary effort is normal. No respiratory distress. Breath sounds: Normal breath sounds. Abdominal:      General: Bowel sounds are normal. There is no distension. Tenderness: There is no abdominal tenderness. Musculoskeletal:         General: Normal range of motion. Cervical back: Normal range of motion and neck supple. No rigidity or tenderness. Comments: Musculature tenderness to palpation right paracervical right trapezius right posterior thoracic.   She also

## 2023-09-29 ENCOUNTER — HOSPITAL ENCOUNTER (EMERGENCY)
Age: 43
Discharge: HOME | End: 2023-09-29
Attending: EMERGENCY MEDICINE
Payer: COMMERCIAL

## 2023-09-29 VITALS
OXYGEN SATURATION: 99 % | TEMPERATURE: 98.1 F | RESPIRATION RATE: 16 BRPM | SYSTOLIC BLOOD PRESSURE: 162 MMHG | BODY MASS INDEX: 38.41 KG/M2 | WEIGHT: 225 LBS | HEART RATE: 90 BPM | DIASTOLIC BLOOD PRESSURE: 100 MMHG | HEIGHT: 64 IN

## 2023-09-29 DIAGNOSIS — L03.012 PARONYCHIA OF LEFT THUMB: Primary | ICD-10-CM

## 2023-09-29 PROCEDURE — 99283 EMERGENCY DEPT VISIT LOW MDM: CPT

## 2023-09-29 RX ORDER — ACETAMINOPHEN 500 MG
500 TABLET ORAL 4 TIMES DAILY PRN
Qty: 40 TABLET | Refills: 0 | Status: SHIPPED | OUTPATIENT
Start: 2023-09-29

## 2023-09-29 RX ORDER — SULFAMETHOXAZOLE AND TRIMETHOPRIM 800; 160 MG/1; MG/1
1 TABLET ORAL 2 TIMES DAILY
Qty: 20 TABLET | Refills: 0 | Status: SHIPPED | OUTPATIENT
Start: 2023-09-29 | End: 2023-10-09

## 2023-09-29 ASSESSMENT — PAIN DESCRIPTION - DESCRIPTORS: DESCRIPTORS: THROBBING

## 2023-09-29 ASSESSMENT — PAIN DESCRIPTION - ORIENTATION: ORIENTATION: LEFT

## 2023-09-29 ASSESSMENT — PAIN SCALES - GENERAL: PAINLEVEL_OUTOF10: 10

## 2023-09-29 ASSESSMENT — PAIN DESCRIPTION - FREQUENCY: FREQUENCY: CONTINUOUS

## 2023-09-29 ASSESSMENT — PAIN DESCRIPTION - PAIN TYPE: TYPE: ACUTE PAIN

## 2023-09-29 ASSESSMENT — PAIN - FUNCTIONAL ASSESSMENT: PAIN_FUNCTIONAL_ASSESSMENT: 0-10

## 2023-10-04 RX ORDER — SODIUM CHLORIDE, SODIUM LACTATE, POTASSIUM CHLORIDE, CALCIUM CHLORIDE 600; 310; 30; 20 MG/100ML; MG/100ML; MG/100ML; MG/100ML
INJECTION, SOLUTION INTRAVENOUS CONTINUOUS
OUTPATIENT
Start: 2023-10-04

## 2023-10-05 ENCOUNTER — HOSPITAL ENCOUNTER (OUTPATIENT)
Dept: PREADMISSION TESTING | Age: 43
Discharge: HOME | End: 2023-10-05
Payer: COMMERCIAL

## 2023-10-05 VITALS
OXYGEN SATURATION: 99 % | SYSTOLIC BLOOD PRESSURE: 128 MMHG | WEIGHT: 244 LBS | RESPIRATION RATE: 16 BRPM | HEIGHT: 64 IN | DIASTOLIC BLOOD PRESSURE: 80 MMHG | BODY MASS INDEX: 41.66 KG/M2 | HEART RATE: 80 BPM | TEMPERATURE: 97.5 F

## 2023-10-05 DIAGNOSIS — Z01.818 PREOP TESTING: Primary | ICD-10-CM

## 2023-10-05 LAB
ANION GAP SERPL CALCULATED.3IONS-SCNC: 13 MMOL/L (ref 7–16)
BASOPHILS # BLD: 0.03 K/UL (ref 0–0.2)
BASOPHILS NFR BLD: 0 % (ref 0–2)
BUN SERPL-MCNC: 13 MG/DL (ref 6–20)
CALCIUM SERPL-MCNC: 8.6 MG/DL (ref 8.6–10.2)
CHLORIDE SERPL-SCNC: 108 MMOL/L (ref 98–107)
CO2 SERPL-SCNC: 20 MMOL/L (ref 22–29)
CREAT SERPL-MCNC: 0.8 MG/DL (ref 0.5–1)
EOSINOPHIL # BLD: 0.06 K/UL (ref 0.05–0.5)
EOSINOPHILS RELATIVE PERCENT: 1 % (ref 0–6)
ERYTHROCYTE [DISTWIDTH] IN BLOOD BY AUTOMATED COUNT: 17.1 % (ref 11.5–15)
GFR SERPL CREATININE-BSD FRML MDRD: >60 ML/MIN/1.73M2
GLUCOSE SERPL-MCNC: 98 MG/DL (ref 74–99)
HCT VFR BLD AUTO: 32.3 % (ref 34–48)
HGB BLD-MCNC: 10.4 G/DL (ref 11.5–15.5)
IMM GRANULOCYTES # BLD AUTO: <0.03 K/UL (ref 0–0.58)
IMM GRANULOCYTES NFR BLD: 0 % (ref 0–5)
LYMPHOCYTES NFR BLD: 1.89 K/UL (ref 1.5–4)
LYMPHOCYTES RELATIVE PERCENT: 28 % (ref 20–42)
MCH RBC QN AUTO: 25.4 PG (ref 26–35)
MCHC RBC AUTO-ENTMCNC: 32.2 G/DL (ref 32–34.5)
MCV RBC AUTO: 79 FL (ref 80–99.9)
MONOCYTES NFR BLD: 0.75 K/UL (ref 0.1–0.95)
MONOCYTES NFR BLD: 11 % (ref 2–12)
NEUTROPHILS NFR BLD: 59 % (ref 43–80)
NEUTS SEG NFR BLD: 3.97 K/UL (ref 1.8–7.3)
PLATELET # BLD AUTO: 312 K/UL (ref 130–450)
PMV BLD AUTO: 9.4 FL (ref 7–12)
POTASSIUM SERPL-SCNC: 3.8 MMOL/L (ref 3.5–5)
RBC # BLD AUTO: 4.09 M/UL (ref 3.5–5.5)
SODIUM SERPL-SCNC: 141 MMOL/L (ref 132–146)
WBC OTHER # BLD: 6.7 K/UL (ref 4.5–11.5)

## 2023-10-05 PROCEDURE — 93005 ELECTROCARDIOGRAM TRACING: CPT | Performed by: ANESTHESIOLOGY

## 2023-10-05 PROCEDURE — 85025 COMPLETE CBC W/AUTO DIFF WBC: CPT

## 2023-10-05 PROCEDURE — 80048 BASIC METABOLIC PNL TOTAL CA: CPT

## 2023-10-05 NOTE — PROGRESS NOTES
toes.    DO NOT wear any jewelry or piercings on day of surgery. All body piercing jewelry must be removed. Shower the night before surgery with _x__Antibacterial soap /CHG WIPES____x____    TOTAL JOINT REPLACEMENT/HYSTERECTOMY PATIENTS ONLY---Remember to bring Blood Bank bracelet to the hospital on the day of surgery. If you have a Living Will and Durable Power of  for Healthcare, please bring in a copy. If appropriate bring crutches, inspirex, WALKER, CANE etc... Notify your Surgeon if you develop any illness between now and surgery time, cough, cold, fever, sore throat, nausea, vomiting, etc.  Please notify your surgeon if you experience dizziness, shortness of breath or blurred vision between now & the time of your surgery. If you have ___dentures, they will be removed before going to the OR; we will provide you a container. If you wear ___contact lenses or ___glasses, they will be removed; please bring a case for them. To provide excellent care visitors will be limited to 2 in the room at any given time. Please bring picture ID and insurance card. During flu season no children under the age of 15 are permitted in the hospital for the safety of all patients. Other please come in through main entrance and go to information desk     Any implantable device requiring remote therapy, Please bring remote day of surgery and bring your implant card with you! Please call AMBULATORY CARE if you have any further questions.    [address]

## 2023-10-06 LAB
EKG ATRIAL RATE: 81 BPM
EKG P AXIS: 40 DEGREES
EKG P-R INTERVAL: 162 MS
EKG Q-T INTERVAL: 398 MS
EKG QRS DURATION: 76 MS
EKG QTC CALCULATION (BAZETT): 462 MS
EKG R AXIS: -2 DEGREES
EKG T AXIS: 3 DEGREES
EKG VENTRICULAR RATE: 81 BPM

## 2023-10-09 ENCOUNTER — TELEPHONE (OUTPATIENT)
Dept: BARIATRICS/WEIGHT MGMT | Age: 43
End: 2023-10-09

## 2023-10-09 ENCOUNTER — OFFICE VISIT (OUTPATIENT)
Dept: ORTHOPEDIC SURGERY | Age: 43
End: 2023-10-09
Payer: COMMERCIAL

## 2023-10-09 VITALS — WEIGHT: 244 LBS | BODY MASS INDEX: 41.66 KG/M2 | TEMPERATURE: 98 F | HEIGHT: 64 IN

## 2023-10-09 DIAGNOSIS — M25.511 ACUTE PAIN OF RIGHT SHOULDER: Primary | ICD-10-CM

## 2023-10-09 DIAGNOSIS — M25.811 IMPINGEMENT OF RIGHT SHOULDER: ICD-10-CM

## 2023-10-09 PROCEDURE — G8427 DOCREV CUR MEDS BY ELIG CLIN: HCPCS | Performed by: ORTHOPAEDIC SURGERY

## 2023-10-09 PROCEDURE — G8484 FLU IMMUNIZE NO ADMIN: HCPCS | Performed by: ORTHOPAEDIC SURGERY

## 2023-10-09 PROCEDURE — 1036F TOBACCO NON-USER: CPT | Performed by: ORTHOPAEDIC SURGERY

## 2023-10-09 PROCEDURE — G8417 CALC BMI ABV UP PARAM F/U: HCPCS | Performed by: ORTHOPAEDIC SURGERY

## 2023-10-09 PROCEDURE — 99213 OFFICE O/P EST LOW 20 MIN: CPT | Performed by: ORTHOPAEDIC SURGERY

## 2023-10-09 NOTE — TELEPHONE ENCOUNTER
Received call from Thomas in [facility] as follow up to patient needing cardiac clearance and if we had spoke with patient. Per patients PCP Dr. Griffin Riddle, patient will need cardiac clearance before proceeding with surgery. Explained I would speak with patient. Spoke with patient and she states she thought we were requesting cardiac clearance. I explained that Dr. Griffin Riddle was requesting based on her EKG. Patient has not heard anything further. I instructed patient that once she is able to obtain appointment with cardiology, to call me and we can reschedule her surgery. I also explained that she may want to reach out to Dr. Rajendra Fleming office regarding that cardiology consult. Patient verbalized understanding.

## 2023-10-09 NOTE — PROGRESS NOTES
Objective:   Physical Exam   Constitutional: Oriented to person, place, and time. and appears well-developed and well-nourished. :   Head: Normocephalic and atraumatic. Neck: Neck supple. Eyes: EOM are normal.   Pulmonary/Chest: Effort normal.  No respiratory distress, no wheezes. Neurological: Alert and oriented to person  Skin: Skin is warm and dry. Rebecca Muñoz DO    10/9/23  10:30 AM    All reasonable efforts have been made to minimize the risk of errors that may occur in the use of voice recognition and other electronic means of charting.

## 2023-10-10 ENCOUNTER — OFFICE VISIT (OUTPATIENT)
Dept: CARDIOLOGY CLINIC | Age: 43
End: 2023-10-10

## 2023-10-10 VITALS
HEART RATE: 87 BPM | HEIGHT: 64 IN | BODY MASS INDEX: 42 KG/M2 | WEIGHT: 246 LBS | DIASTOLIC BLOOD PRESSURE: 90 MMHG | RESPIRATION RATE: 16 BRPM | SYSTOLIC BLOOD PRESSURE: 110 MMHG

## 2023-10-10 DIAGNOSIS — R94.31 EKG, ABNORMAL: ICD-10-CM

## 2023-10-10 DIAGNOSIS — I10 PRIMARY HYPERTENSION: Primary | ICD-10-CM

## 2023-10-10 DIAGNOSIS — Z01.818 PRE-OP TESTING: Primary | ICD-10-CM

## 2023-10-10 NOTE — PROGRESS NOTES
[facility] Cardiology consult  Dr. Jayne Meek      Reason for Consult: Preoperative cardiac evaluation prior to bariatric surgery  Referring Physician: Tyshawn Cruz DO     CHIEF COMPLAINT:   Chief Complaint   Patient presents with    Heart Problem     NP per Marcello SANTA cardiac clearance, abnormal EKG       HISTORY OF PRESENT ILLNESS:   Patient is 39years old female with hypertension, was referred to cardiology for preoperative cardiac evaluation prior to bariatric surgery. Patient denies any chest pain, no shortness of breath, no lightheadedness, no dizziness, no palpitations, no pedal edema, no PND, no orthopnea, no syncope, no presyncopal episodes.   Can climb a flight of stairs without any cardiac complaints    Past Medical History:   Diagnosis Date    Arthritis     COVID     SOB ever since, on inhaler, improving per pt    Hypertension     during pregnancy    Iron deficiency anemia due to chronic blood loss 2023    Iron deficiency anemia due to chronic blood loss 2023    Obesity (BMI 35.0-39.9 without comorbidity) 2023    Primary hypertension 2023         Past Surgical History:   Procedure Laterality Date    ABDOMEN SURGERY       SECTION  ,,,,    DILATION AND CURETTAGE OF UTERUS N/A 2021    IUD REMOVAL ([address]) (CPT [address]) performed by Ayaka Garcias MD at [address] N/A 2020    LAPAROSCOPIC HIATAL HERNIA REPAIR WITH MESH, TRUNCAL VAGOTOMY performed by Jamel Walton MD at [address] [facility]  2017    Laparascopic    UPPER GASTROINTESTINAL ENDOSCOPY  2016    Dr. Earline Pugh N/A 10/9/2020    EGD BIOPSY performed by Jamel Walton MD at [address] [facility] N/A 2023    EGD ESOPHAGOGASTRODUODENOSCOPY performed by Martin Rainey MD at [address] [facility]           Current Outpatient Medications   Medication Sig

## 2023-10-17 ENCOUNTER — TELEPHONE (OUTPATIENT)
Dept: BARIATRICS/WEIGHT MGMT | Age: 43
End: 2023-10-17

## 2023-10-17 NOTE — TELEPHONE ENCOUNTER
Prior Authorization Form  DEMOGRAPHICS:     Patient Name:  Taylor España  Patient :  1980                                                    Insurance:  Payor: [address] [place] [address] / Plan: [address] [place] [address] / Product Type: *No Product type* /   Insurance ID Number:    Payer/Plan Subscr  Sex Relation Sub. Ins. ID Effective Group Num   1. [address] D 1980 Female Self [id number] 3/3/23                                     [address]         2.  [facility] D 1980 Female Self [id number] 1/1/15                                     [address]                  DIAGNOSIS & PROCEDURE:     Procedure/Operation: diagnostic laparoscopy with reduction and closure of internal hernia and possible revision of GJ anastomosis                                                                                  CPT Code: 50759     Diagnosis:  ABDOMINAL PAIN     ICD10 Code: R10.9     Location:  [address] [place] [address]     Surgeon:  Simon Rojas INFORMATION:     Date: 10-24-23                        Time: UNKNOWN                   Anesthesia:  General                                                       Status:  Outpatient         Special Comments:  NONE

## 2023-10-19 RX ORDER — SODIUM CHLORIDE, SODIUM LACTATE, POTASSIUM CHLORIDE, CALCIUM CHLORIDE 600; 310; 30; 20 MG/100ML; MG/100ML; MG/100ML; MG/100ML
INJECTION, SOLUTION INTRAVENOUS CONTINUOUS
OUTPATIENT
Start: 2023-10-19

## 2023-10-20 ENCOUNTER — HOSPITAL ENCOUNTER (OUTPATIENT)
Dept: PREADMISSION TESTING | Age: 43
Discharge: HOME | End: 2023-10-20
Payer: COMMERCIAL

## 2023-10-20 VITALS
SYSTOLIC BLOOD PRESSURE: 145 MMHG | RESPIRATION RATE: 16 BRPM | OXYGEN SATURATION: 99 % | WEIGHT: 243 LBS | TEMPERATURE: 97.8 F | DIASTOLIC BLOOD PRESSURE: 95 MMHG | HEIGHT: 64 IN | BODY MASS INDEX: 41.48 KG/M2 | HEART RATE: 85 BPM

## 2023-10-20 DIAGNOSIS — Z01.818 PRE-OP TESTING: Primary | ICD-10-CM

## 2023-10-20 LAB
ANION GAP SERPL CALCULATED.3IONS-SCNC: 11 MMOL/L (ref 7–16)
BASOPHILS # BLD: 0.03 K/UL (ref 0–0.2)
BASOPHILS NFR BLD: 1 % (ref 0–2)
BUN SERPL-MCNC: 9 MG/DL (ref 6–20)
CALCIUM SERPL-MCNC: 8.5 MG/DL (ref 8.6–10.2)
CHLORIDE SERPL-SCNC: 107 MMOL/L (ref 98–107)
CO2 SERPL-SCNC: 20 MMOL/L (ref 22–29)
CREAT SERPL-MCNC: 0.7 MG/DL (ref 0.5–1)
EOSINOPHIL # BLD: 0.06 K/UL (ref 0.05–0.5)
EOSINOPHILS RELATIVE PERCENT: 2 % (ref 0–6)
ERYTHROCYTE [DISTWIDTH] IN BLOOD BY AUTOMATED COUNT: 17.2 % (ref 11.5–15)
GFR SERPL CREATININE-BSD FRML MDRD: >60 ML/MIN/1.73M2
GLUCOSE SERPL-MCNC: 90 MG/DL (ref 74–99)
HCT VFR BLD AUTO: 31.3 % (ref 34–48)
HGB BLD-MCNC: 9.8 G/DL (ref 11.5–15.5)
IMM GRANULOCYTES # BLD AUTO: <0.03 K/UL (ref 0–0.58)
IMM GRANULOCYTES NFR BLD: 0 % (ref 0–5)
LYMPHOCYTES NFR BLD: 1.51 K/UL (ref 1.5–4)
LYMPHOCYTES RELATIVE PERCENT: 40 % (ref 20–42)
MCH RBC QN AUTO: 24.7 PG (ref 26–35)
MCHC RBC AUTO-ENTMCNC: 31.3 G/DL (ref 32–34.5)
MCV RBC AUTO: 78.8 FL (ref 80–99.9)
MONOCYTES NFR BLD: 0.4 K/UL (ref 0.1–0.95)
MONOCYTES NFR BLD: 11 % (ref 2–12)
NEUTROPHILS NFR BLD: 47 % (ref 43–80)
NEUTS SEG NFR BLD: 1.77 K/UL (ref 1.8–7.3)
PLATELET # BLD AUTO: 350 K/UL (ref 130–450)
PMV BLD AUTO: 8.9 FL (ref 7–12)
POTASSIUM SERPL-SCNC: 3.7 MMOL/L (ref 3.5–5)
RBC # BLD AUTO: 3.97 M/UL (ref 3.5–5.5)
SODIUM SERPL-SCNC: 138 MMOL/L (ref 132–146)
WBC OTHER # BLD: 3.8 K/UL (ref 4.5–11.5)

## 2023-10-20 PROCEDURE — 80048 BASIC METABOLIC PNL TOTAL CA: CPT

## 2023-10-20 PROCEDURE — 85025 COMPLETE CBC W/AUTO DIFF WBC: CPT

## 2023-10-20 NOTE — PROGRESS NOTES
[facility]                                                                                                                    PRE OP INSTRUCTIONS FOR  Ganesh Ford        Date: 10/20/2023    Date of surgery: 10/24/23   Arrival Time: Hospital will call you between 5pm and 7pm with your final arrival time for surgery    Do not eat or drink anything after midnight prior to surgery. This includes no water, chewing gum, mints or ice chips. Take the following medications with a small sip of water on the morning of Surgery: allergy meds, nasal spray. Bring albuterol       Aspirin, Ibuprofen, Advil, Naproxen, Vitamin E and other Anti-inflammatory products should be stopped  before surgery  as directed by your physician. Take Tylenol only unless instructed otherwise by your surgeon. Do not smoke,use illicit drugs and do not drink any alcoholic beverages 24 hours prior to surgery. You may brush your teeth the morning of surgery. DO NOT SWALLOW WATER    You MUST make arrangements for a responsible adult to take you home after your surgery. You will not be allowed to leave alone or drive yourself home. It is strongly suggested someone stay with you the first 24 hrs. Your surgery will be cancelled if you do not have a ride home. Please wear simple, loose fitting clothing to the hospital.  Sharl Mealy not bring valuables (money, credit cards, checkbooks, etc.) Do not wear any makeup (including no eye makeup) or nail polish on your fingers or toes. DO NOT wear any jewelry or piercings on day of surgery. All body piercing jewelry must be removed.     Shower the night before surgery with _x__Antibacterial soap /SHAISTA WIPES___x_____        Notify your Surgeon if you develop any illness between now and surgery time, cough, cold, fever, sore throat, nausea, vomiting, etc.  Please notify your surgeon if you experience dizziness, shortness of breath or blurred vision between now & the time of your

## 2023-10-23 ENCOUNTER — ANESTHESIA EVENT (OUTPATIENT)
Dept: OPERATING ROOM | Age: 43
End: 2023-10-23
Payer: COMMERCIAL

## 2023-10-24 ENCOUNTER — TELEPHONE (OUTPATIENT)
Dept: BARIATRICS/WEIGHT MGMT | Age: 43
End: 2023-10-24

## 2023-10-24 ENCOUNTER — ANESTHESIA (OUTPATIENT)
Dept: OPERATING ROOM | Age: 43
End: 2023-10-24
Payer: COMMERCIAL

## 2023-10-24 ENCOUNTER — HOSPITAL ENCOUNTER (OUTPATIENT)
Age: 43
Setting detail: OUTPATIENT SURGERY
Discharge: HOME | End: 2023-10-24
Attending: SURGERY | Admitting: SURGERY
Payer: COMMERCIAL

## 2023-10-24 VITALS
HEIGHT: 64 IN | TEMPERATURE: 97 F | HEART RATE: 81 BPM | OXYGEN SATURATION: 99 % | DIASTOLIC BLOOD PRESSURE: 83 MMHG | BODY MASS INDEX: 41.48 KG/M2 | RESPIRATION RATE: 20 BRPM | SYSTOLIC BLOOD PRESSURE: 147 MMHG | WEIGHT: 243 LBS

## 2023-10-24 DIAGNOSIS — R10.13 EPIGASTRIC PAIN: Primary | ICD-10-CM

## 2023-10-24 LAB
HCG, URINE, POC: NEGATIVE
Lab: NORMAL
NEGATIVE QC PASS/FAIL: NORMAL
POSITIVE QC PASS/FAIL: NORMAL

## 2023-10-24 PROCEDURE — 2580000003 HC RX 258: Performed by: SURGERY

## 2023-10-24 PROCEDURE — 6360000002 HC RX W HCPCS: Performed by: SURGERY

## 2023-10-24 PROCEDURE — 6360000002 HC RX W HCPCS: Performed by: ANESTHESIOLOGY

## 2023-10-24 PROCEDURE — 7100000011 HC PHASE II RECOVERY - ADDTL 15 MIN: Performed by: SURGERY

## 2023-10-24 PROCEDURE — 99024 POSTOP FOLLOW-UP VISIT: CPT | Performed by: SURGERY

## 2023-10-24 PROCEDURE — 3600000004 HC SURGERY LEVEL 4 BASE: Performed by: SURGERY

## 2023-10-24 PROCEDURE — 2720000010 HC SURG SUPPLY STERILE: Performed by: SURGERY

## 2023-10-24 PROCEDURE — 3700000001 HC ADD 15 MINUTES (ANESTHESIA): Performed by: SURGERY

## 2023-10-24 PROCEDURE — 3600000014 HC SURGERY LEVEL 4 ADDTL 15MIN: Performed by: SURGERY

## 2023-10-24 PROCEDURE — 2500000003 HC RX 250 WO HCPCS: Performed by: NURSE ANESTHETIST, CERTIFIED REGISTERED

## 2023-10-24 PROCEDURE — 3700000000 HC ANESTHESIA ATTENDED CARE: Performed by: SURGERY

## 2023-10-24 PROCEDURE — 7100000010 HC PHASE II RECOVERY - FIRST 15 MIN: Performed by: SURGERY

## 2023-10-24 PROCEDURE — 7100000000 HC PACU RECOVERY - FIRST 15 MIN: Performed by: SURGERY

## 2023-10-24 PROCEDURE — 2500000003 HC RX 250 WO HCPCS: Performed by: SURGERY

## 2023-10-24 PROCEDURE — 6360000002 HC RX W HCPCS: Performed by: NURSE ANESTHETIST, CERTIFIED REGISTERED

## 2023-10-24 PROCEDURE — 7100000001 HC PACU RECOVERY - ADDTL 15 MIN: Performed by: SURGERY

## 2023-10-24 PROCEDURE — 2709999900 HC NON-CHARGEABLE SUPPLY: Performed by: SURGERY

## 2023-10-24 PROCEDURE — 2580000003 HC RX 258: Performed by: ANESTHESIOLOGY

## 2023-10-24 RX ORDER — METHOCARBAMOL 100 MG/ML
1000 INJECTION, SOLUTION INTRAMUSCULAR; INTRAVENOUS
Status: COMPLETED | OUTPATIENT
Start: 2023-10-24 | End: 2023-10-24

## 2023-10-24 RX ORDER — TRAMADOL HYDROCHLORIDE 50 MG/1
50 TABLET ORAL EVERY 6 HOURS PRN
Qty: 12 TABLET | Refills: 0 | Status: SHIPPED | OUTPATIENT
Start: 2023-10-24 | End: 2023-10-27

## 2023-10-24 RX ORDER — LABETALOL HYDROCHLORIDE 5 MG/ML
INJECTION, SOLUTION INTRAVENOUS PRN
Status: DISCONTINUED | OUTPATIENT
Start: 2023-10-24 | End: 2023-10-24 | Stop reason: SDUPTHER

## 2023-10-24 RX ORDER — GLYCOPYRROLATE 0.2 MG/ML
INJECTION INTRAMUSCULAR; INTRAVENOUS PRN
Status: DISCONTINUED | OUTPATIENT
Start: 2023-10-24 | End: 2023-10-24 | Stop reason: SDUPTHER

## 2023-10-24 RX ORDER — HYDRALAZINE HYDROCHLORIDE 20 MG/ML
10 INJECTION INTRAMUSCULAR; INTRAVENOUS
Status: DISCONTINUED | OUTPATIENT
Start: 2023-10-24 | End: 2023-10-24 | Stop reason: HOSPADM

## 2023-10-24 RX ORDER — SODIUM CHLORIDE, SODIUM LACTATE, POTASSIUM CHLORIDE, CALCIUM CHLORIDE 600; 310; 30; 20 MG/100ML; MG/100ML; MG/100ML; MG/100ML
INJECTION, SOLUTION INTRAVENOUS CONTINUOUS
Status: DISCONTINUED | OUTPATIENT
Start: 2023-10-24 | End: 2023-10-24 | Stop reason: HOSPADM

## 2023-10-24 RX ORDER — DEXAMETHASONE SODIUM PHOSPHATE 10 MG/ML
INJECTION, SOLUTION INTRAMUSCULAR; INTRAVENOUS PRN
Status: DISCONTINUED | OUTPATIENT
Start: 2023-10-24 | End: 2023-10-24 | Stop reason: SDUPTHER

## 2023-10-24 RX ORDER — FENTANYL CITRATE 0.05 MG/ML
25 INJECTION, SOLUTION INTRAMUSCULAR; INTRAVENOUS EVERY 5 MIN PRN
Status: DISCONTINUED | OUTPATIENT
Start: 2023-10-24 | End: 2023-10-24 | Stop reason: HOSPADM

## 2023-10-24 RX ORDER — IPRATROPIUM BROMIDE AND ALBUTEROL SULFATE 2.5; .5 MG/3ML; MG/3ML
1 SOLUTION RESPIRATORY (INHALATION)
Status: DISCONTINUED | OUTPATIENT
Start: 2023-10-24 | End: 2023-10-24 | Stop reason: HOSPADM

## 2023-10-24 RX ORDER — DIPHENHYDRAMINE HYDROCHLORIDE 50 MG/ML
12.5 INJECTION INTRAMUSCULAR; INTRAVENOUS
Status: DISCONTINUED | OUTPATIENT
Start: 2023-10-24 | End: 2023-10-24 | Stop reason: HOSPADM

## 2023-10-24 RX ORDER — ROCURONIUM BROMIDE 10 MG/ML
INJECTION, SOLUTION INTRAVENOUS PRN
Status: DISCONTINUED | OUTPATIENT
Start: 2023-10-24 | End: 2023-10-24 | Stop reason: SDUPTHER

## 2023-10-24 RX ORDER — LIDOCAINE HYDROCHLORIDE 20 MG/ML
INJECTION, SOLUTION INTRAVENOUS PRN
Status: DISCONTINUED | OUTPATIENT
Start: 2023-10-24 | End: 2023-10-24 | Stop reason: SDUPTHER

## 2023-10-24 RX ORDER — MORPHINE SULFATE 2 MG/ML
2 INJECTION, SOLUTION INTRAMUSCULAR; INTRAVENOUS EVERY 5 MIN PRN
Status: DISCONTINUED | OUTPATIENT
Start: 2023-10-24 | End: 2023-10-24 | Stop reason: HOSPADM

## 2023-10-24 RX ORDER — PROCHLORPERAZINE EDISYLATE 5 MG/ML
5 INJECTION INTRAMUSCULAR; INTRAVENOUS
Status: DISCONTINUED | OUTPATIENT
Start: 2023-10-24 | End: 2023-10-24 | Stop reason: HOSPADM

## 2023-10-24 RX ORDER — SODIUM CHLORIDE 0.9 % (FLUSH) 0.9 %
5-40 SYRINGE (ML) INJECTION EVERY 12 HOURS SCHEDULED
Status: DISCONTINUED | OUTPATIENT
Start: 2023-10-24 | End: 2023-10-24 | Stop reason: HOSPADM

## 2023-10-24 RX ORDER — KETOROLAC TROMETHAMINE 30 MG/ML
INJECTION, SOLUTION INTRAMUSCULAR; INTRAVENOUS PRN
Status: DISCONTINUED | OUTPATIENT
Start: 2023-10-24 | End: 2023-10-24 | Stop reason: SDUPTHER

## 2023-10-24 RX ORDER — SODIUM CHLORIDE 9 MG/ML
INJECTION, SOLUTION INTRAVENOUS PRN
Status: DISCONTINUED | OUTPATIENT
Start: 2023-10-24 | End: 2023-10-24 | Stop reason: HOSPADM

## 2023-10-24 RX ORDER — BUPIVACAINE HYDROCHLORIDE AND EPINEPHRINE 5; 5 MG/ML; UG/ML
INJECTION, SOLUTION EPIDURAL; INTRACAUDAL; PERINEURAL PRN
Status: DISCONTINUED | OUTPATIENT
Start: 2023-10-24 | End: 2023-10-24 | Stop reason: ALTCHOICE

## 2023-10-24 RX ORDER — PROPOFOL 10 MG/ML
INJECTION, EMULSION INTRAVENOUS PRN
Status: DISCONTINUED | OUTPATIENT
Start: 2023-10-24 | End: 2023-10-24 | Stop reason: SDUPTHER

## 2023-10-24 RX ORDER — MIDAZOLAM HYDROCHLORIDE 1 MG/ML
2 INJECTION INTRAMUSCULAR; INTRAVENOUS
Status: DISCONTINUED | OUTPATIENT
Start: 2023-10-24 | End: 2023-10-24 | Stop reason: HOSPADM

## 2023-10-24 RX ORDER — SODIUM CHLORIDE 0.9 % (FLUSH) 0.9 %
5-40 SYRINGE (ML) INJECTION PRN
Status: DISCONTINUED | OUTPATIENT
Start: 2023-10-24 | End: 2023-10-24 | Stop reason: HOSPADM

## 2023-10-24 RX ORDER — DIPHENHYDRAMINE HYDROCHLORIDE 50 MG/ML
INJECTION INTRAMUSCULAR; INTRAVENOUS PRN
Status: DISCONTINUED | OUTPATIENT
Start: 2023-10-24 | End: 2023-10-24 | Stop reason: SDUPTHER

## 2023-10-24 RX ORDER — MIDAZOLAM HYDROCHLORIDE 1 MG/ML
INJECTION INTRAMUSCULAR; INTRAVENOUS PRN
Status: DISCONTINUED | OUTPATIENT
Start: 2023-10-24 | End: 2023-10-24 | Stop reason: SDUPTHER

## 2023-10-24 RX ORDER — FENTANYL CITRATE 50 UG/ML
INJECTION, SOLUTION INTRAMUSCULAR; INTRAVENOUS PRN
Status: DISCONTINUED | OUTPATIENT
Start: 2023-10-24 | End: 2023-10-24 | Stop reason: SDUPTHER

## 2023-10-24 RX ORDER — MEPERIDINE HYDROCHLORIDE 25 MG/ML
12.5 INJECTION INTRAMUSCULAR; INTRAVENOUS; SUBCUTANEOUS EVERY 5 MIN PRN
Status: DISCONTINUED | OUTPATIENT
Start: 2023-10-24 | End: 2023-10-24 | Stop reason: HOSPADM

## 2023-10-24 RX ORDER — ONDANSETRON 2 MG/ML
INJECTION INTRAMUSCULAR; INTRAVENOUS PRN
Status: DISCONTINUED | OUTPATIENT
Start: 2023-10-24 | End: 2023-10-24 | Stop reason: SDUPTHER

## 2023-10-24 RX ORDER — LABETALOL HYDROCHLORIDE 5 MG/ML
10 INJECTION, SOLUTION INTRAVENOUS
Status: DISCONTINUED | OUTPATIENT
Start: 2023-10-24 | End: 2023-10-24 | Stop reason: HOSPADM

## 2023-10-24 RX ORDER — NEOSTIGMINE METHYLSULFATE 1 MG/ML
INJECTION, SOLUTION INTRAVENOUS PRN
Status: DISCONTINUED | OUTPATIENT
Start: 2023-10-24 | End: 2023-10-24 | Stop reason: SDUPTHER

## 2023-10-24 RX ORDER — ONDANSETRON 2 MG/ML
4 INJECTION INTRAMUSCULAR; INTRAVENOUS
Status: DISCONTINUED | OUTPATIENT
Start: 2023-10-24 | End: 2023-10-24 | Stop reason: HOSPADM

## 2023-10-24 RX ADMIN — LIDOCAINE HYDROCHLORIDE 20 MG: 20 INJECTION, SOLUTION INTRAVENOUS at 09:30

## 2023-10-24 RX ADMIN — FENTANYL CITRATE 150 MCG: 50 INJECTION, SOLUTION INTRAMUSCULAR; INTRAVENOUS at 09:06

## 2023-10-24 RX ADMIN — FENTANYL CITRATE 50 MCG: 50 INJECTION, SOLUTION INTRAMUSCULAR; INTRAVENOUS at 09:38

## 2023-10-24 RX ADMIN — PROPOFOL 180 MG: 10 INJECTION, EMULSION INTRAVENOUS at 09:06

## 2023-10-24 RX ADMIN — KETOROLAC TROMETHAMINE 30 MG: 30 INJECTION, SOLUTION INTRAMUSCULAR; INTRAVENOUS at 09:30

## 2023-10-24 RX ADMIN — MIDAZOLAM 2 MG: 1 INJECTION INTRAMUSCULAR; INTRAVENOUS at 09:00

## 2023-10-24 RX ADMIN — ONDANSETRON 4 MG: 2 INJECTION INTRAMUSCULAR; INTRAVENOUS at 09:19

## 2023-10-24 RX ADMIN — METHOCARBAMOL 1000 MG: 100 INJECTION INTRAMUSCULAR; INTRAVENOUS at 10:01

## 2023-10-24 RX ADMIN — Medication 3 MG: at 09:30

## 2023-10-24 RX ADMIN — CEFAZOLIN 2000 MG: 2 INJECTION, POWDER, FOR SOLUTION INTRAMUSCULAR; INTRAVENOUS at 09:10

## 2023-10-24 RX ADMIN — SODIUM CHLORIDE, POTASSIUM CHLORIDE, SODIUM LACTATE AND CALCIUM CHLORIDE: 600; 310; 30; 20 INJECTION, SOLUTION INTRAVENOUS at 08:43

## 2023-10-24 RX ADMIN — MORPHINE SULFATE 2 MG: 2 INJECTION, SOLUTION INTRAMUSCULAR; INTRAVENOUS at 10:26

## 2023-10-24 RX ADMIN — DEXAMETHASONE SODIUM PHOSPHATE 10 MG: 10 INJECTION, SOLUTION INTRAMUSCULAR; INTRAVENOUS at 09:19

## 2023-10-24 RX ADMIN — FENTANYL CITRATE 50 MCG: 50 INJECTION, SOLUTION INTRAMUSCULAR; INTRAVENOUS at 09:16

## 2023-10-24 RX ADMIN — ROCURONIUM BROMIDE 40 MG: 10 INJECTION, SOLUTION INTRAVENOUS at 09:07

## 2023-10-24 RX ADMIN — PROPOFOL 20 MG: 10 INJECTION, EMULSION INTRAVENOUS at 09:38

## 2023-10-24 RX ADMIN — LIDOCAINE HYDROCHLORIDE 80 MG: 20 INJECTION, SOLUTION INTRAVENOUS at 09:06

## 2023-10-24 RX ADMIN — DIPHENHYDRAMINE HYDROCHLORIDE 12.5 MG: 50 INJECTION INTRAMUSCULAR; INTRAVENOUS at 09:00

## 2023-10-24 RX ADMIN — LABETALOL HYDROCHLORIDE 5 MG: 5 INJECTION INTRAVENOUS at 09:19

## 2023-10-24 RX ADMIN — GLYCOPYRROLATE 0.4 MG: 0.2 INJECTION INTRAMUSCULAR; INTRAVENOUS at 09:30

## 2023-10-24 ASSESSMENT — PAIN DESCRIPTION - LOCATION
LOCATION: ABDOMEN

## 2023-10-24 ASSESSMENT — PAIN - FUNCTIONAL ASSESSMENT
PAIN_FUNCTIONAL_ASSESSMENT: 0-10
PAIN_FUNCTIONAL_ASSESSMENT: ACTIVITIES ARE NOT PREVENTED
PAIN_FUNCTIONAL_ASSESSMENT: ACTIVITIES ARE NOT PREVENTED

## 2023-10-24 ASSESSMENT — PAIN DESCRIPTION - FREQUENCY: FREQUENCY: INTERMITTENT

## 2023-10-24 ASSESSMENT — ENCOUNTER SYMPTOMS: SHORTNESS OF BREATH: 1

## 2023-10-24 ASSESSMENT — PAIN DESCRIPTION - PAIN TYPE: TYPE: SURGICAL PAIN

## 2023-10-24 ASSESSMENT — PAIN SCALES - GENERAL
PAINLEVEL_OUTOF10: 9
PAINLEVEL_OUTOF10: 6
PAINLEVEL_OUTOF10: 0
PAINLEVEL_OUTOF10: 8

## 2023-10-24 ASSESSMENT — PAIN DESCRIPTION - DESCRIPTORS
DESCRIPTORS: CRAMPING
DESCRIPTORS: CRAMPING

## 2023-10-24 ASSESSMENT — PAIN DESCRIPTION - ORIENTATION: ORIENTATION: MID

## 2023-10-24 ASSESSMENT — PAIN DESCRIPTION - ONSET: ONSET: ON-GOING

## 2023-10-24 ASSESSMENT — PAIN SCALES - WONG BAKER: WONGBAKER_NUMERICALRESPONSE: 0

## 2023-10-24 NOTE — ANESTHESIA PRE PROCEDURE
11:00 AM    ALT 20 08/05/2023 11:00 AM       POC Tests: No results for input(s): \"POCGLU\", \"POCNA\", \"POCK\", \"POCCL\", \"POCBUN\", \"POCHEMO\", \"POCHCT\" in the last 72 hours. Coags: No results found for: \"PROTIME\", \"INR\", \"APTT\"    HCG (If Applicable):   Lab Results   Component Value Date    PREGTESTUR NEGATIVE 07/11/2022        ABGs: No results found for: \"PHART\", \"PO2ART\", \"FSP8AEG\", \"MBK4QGR\", \"BEART\", \"L6RAAWZF\"     Type & Screen (If Applicable):  No results found for: \"LABABO\", \"LABRH\"    Drug/Infectious Status (If Applicable):  No results found for: \"HIV\", \"HEPCAB\"    COVID-19 Screening (If Applicable):   Lab Results   Component Value Date/Time    COVID19 DETECTED 12/30/2022 09:10 AM    COVID19 Not Detected 01/06/2021 12:48 PM    COVID19 Not Detected 11/10/2020 01:35 PM           Anesthesia Evaluation  Patient summary reviewed  Airway: Mallampati: III  TM distance: >3 FB   Neck ROM: full  Mouth opening: > = 3 FB   Dental: normal exam         Pulmonary: breath sounds clear to auscultation  (+) shortness of breath (Hx COVID): recurrent,      Smoker: Former. Cardiovascular:    (+) hypertension:,       ECG reviewed  Rhythm: regular  Rate: normal  Echocardiogram reviewed               ROS comment: ECHO 3.10.17   Conclusions      Summary   Left ventricular internal dimensions were normal in diastole and systole. Moderate left ventricular concentric hypertrophy noted. Normal left ventricular ejection fraction. EKG 10.10.23  Sinus  Rhythm   -Poor R-wave progression -nonspecific -consider old anterior infarct. Neuro/Psych:               GI/Hepatic/Renal:   (+) hiatal hernia, GERD:, morbid obesity         ROS comment: S/p Shannon-en-y  . Endo/Other:    (+) blood dyscrasia (9.8/31. 3): anemia:., electrolyte abnormalities (Ca 8.5), . Abdominal:   (+) obese,           Vascular: negative vascular ROS.          Other Findings:           Anesthesia Plan      general     ASA 3

## 2023-10-24 NOTE — TELEPHONE ENCOUNTER
Received message from patient regarding discharge instructions. Spoke with patient and clarified diet and activity.

## 2023-10-24 NOTE — H&P
Shannon-en- Y Gastric Bypass  6 Year Post-Operative Follow-up      Joanie Figueroa is a 36 y.o. female who is 6 years post Laparoscopic Shannon-en-Y Gastric Bypass surgery. Reports wt gain. She is not having swallowing difficulty, is compliant most of the time with the multivitamins and calcium + Vit D. She is meeting fluid recommendations of at least 64 ounces per day and is meeting protein recommendations. She  is not exercising: no regular exercise. she is having a twisting cramping pain mid abdomen. Weight=242 lb (109.8 kg)  Today's weight represents a total weight loss of 73 pounds since surgery with 28 pounds regained since the lowest weight. Home Medications           Prior to Admission medications    Medication Sig Start Date End Date Taking?  Authorizing Provider   acetaminophen (TYLENOL) 500 MG tablet Take 1 tablet by mouth 4 times daily as needed for Pain 3/7/23   Yes Jesus Casas MD   vitamin D (ERGOCALCIFEROL) 1.25 MG (47614 UT) CAPS capsule TAKE 1 CAPLET ORALLY TWICE PER WEEK FOR 28 DAYS 12/5/20   Yes Historical Provider, MD   lisinopril (PRINIVIL;ZESTRIL) 10 MG tablet TAKE 1 TABLET BY MOUTH EVERY DAY 12/22/20   Yes Historical Provider, MD   loratadine (CLARITIN) 10 MG tablet TAKE 1 TABLET BY MOUTH EVERY DAY AS NEEDED 12/28/20   Yes Historical Provider, MD   clotrimazole-betamethasone (Carolanne Lock) 1-0.05 % cream APPLY TOPICALLY THREE TIMES DAILY 12/29/20   Yes Historical Provider, MD   albuterol sulfate  (90 Base) MCG/ACT inhaler TAKE 1 PUFF INHALED 4 TIMES PER DAY TAKE AS NEEDED 1/12/20   Yes Historical Provider, MD   fluticasone Bailee Robert) 50 MCG/ACT nasal spray as needed 3/4/19   Yes Historical Provider, MD   Multiple Vitamins-Minerals (THERAPEUTIC MULTIVITAMIN-MINERALS) tablet Take 1 tablet by mouth 2 times daily Indications: bariatric advantage     Yes Historical Provider, MD             Physical exam:   BP (!) 168/111 (Site: Left Lower Arm, Position: Sitting, Cuff Size: Large Adult)

## 2023-10-24 NOTE — OP NOTE
DATE OF PROCEDURE: 10/24/2023      PREOPERATIVE DIAGNOSIS:  Internal hernia. POSTOPERATIVE DIAGNOSIS:  Internal hernia. Large gastrojejunal anastomosis w     PROCEDURE PERFORMED:    1.)Diagnostic laparoscopy with closure of Levin  space and reduction of internal hernia. 2.) revision of gastrojejunal anstomosis  3.) mobilization and placement of omental flap  4.) Esophagogastroduodenoscopy     SURGEON:  Nikos Elizondo MD     ASSISTANT:  maribell     ANESTHESIA:  General.     COMPLICATIONS:  None. FLUIDS:  Crystalloid. BLOOD LOSS:  Minimal.     DISPOSITION:  To be discharged home. INDICATIONS:  This is a previous RYGB with the aforementioned  diagnosis. I explained risks, benefits, potential outcomes, alternate  treatment to the aforementioned procedure and they agreed to proceed  understanding those risks and potential outcomes. SPECIMEN:  None. DESCRIPTION OF PROCEDURE:  The patient was brought to the operative  suite and placed under general anesthesia, bilateral PCDs placed, given  preoperative antibiotics within 30 minutes of incision and the patient  was then prepped and draped in a normal sterile condition. Once this  was done and local anesthetic was infiltrated supraumbilically, 5 mm  incision was then made. A Veress needle was passed in the peritoneum. CO2 was used to insufflate the abdomen at pressure of 15 mmHg. At this  time, the Veress needle was removed and 5 mm trocars put in place. Two additional 5 mm trocars were placed in the right lateral abdomen. Once this was done, were able to see Mirna Music space and there was   bowel through this. The bowel was reduced with graspers and  Levin's space closed with a running V-Loc suture. Once this was  done, all limbs of the bowel were ran without any other appreciable  abnormalities or hernia defects. The remainder of the abdomen appeared  without any pathology. Trocars and pneumoperitoneum were evacuated.  The EGD

## 2023-10-24 NOTE — DISCHARGE INSTRUCTIONS
Patient Discharge Instructions  Discharge Date:  10/24/2023    Discharged To: Home    RESUME ACTIVITY:      BATHING:  May shower 24hrs after surgery, may bathe 3 days after surgery    DRIVING: No driving while on pain medications ok to drive on ibuprofen    RETURN TO WORK: day after surgery, no limitations    WALKING:  Yes    SEXUAL ACTIVITY: Yes    STAIRS:  Yes    LIFTING: no limitations    DIET: bariatric liquid    BOWELS: constipation is a side effect of your pain meds, take a daily laxative (miralax, dulcolax, etc.) as needed to keep your bowels moving as they normally do, do not go 2-3 days without having a bowel movement. SPECIAL INSTRUCTIONS:     Call the office at [phone] if you have a fever > 100 F, or if your incision becomes red, tender, or drains more than a small amount of clear fluid.     Call  for follow up appointment with Dr. Jacinta Alvarado in:  as needed

## 2023-10-24 NOTE — ANESTHESIA POSTPROCEDURE EVALUATION
Department of Anesthesiology  Postprocedure Note    Patient: Mayte Prabhakar  MRN: [id number]  YOB: 1980  Date of evaluation: 10/24/2023      Procedure Summary     Date: 10/24/23 Room / Location: [place] [address] / [facility] ([facility])    Anesthesia Start: 0900 Anesthesia Stop: 4650    Procedure: LAPAROSCOPY DIAGNOSTIC diagnostic laparoscopy with reduction and closure of internal hernia and possible revision of GJ anastomosis Diagnosis:       Abdominal pain      (Abdominal pain [R10.9])    Surgeons: Alyssa Meraz MD Responsible Provider: Acosta Nur MD    Anesthesia Type: general ASA Status: 3          Anesthesia Type: No value filed.     Cori Phase I: Cori Score: 10    Cori Phase II:        Anesthesia Post Evaluation    Patient location during evaluation: PACU  Patient participation: complete - patient participated  Level of consciousness: awake  Airway patency: patent  Nausea & Vomiting: no nausea and no vomiting  Complications: no  Cardiovascular status: hemodynamically stable and blood pressure returned to baseline  Respiratory status: acceptable  Hydration status: euvolemic  Pain management: satisfactory to patient

## 2023-10-24 NOTE — PROGRESS NOTES
CLINICAL PHARMACY NOTE: MEDS TO BEDS    Total # of Prescriptions Filled: 1   The following medications were delivered to the patient:  Ultram 50 mg     Additional Documentation:

## 2023-11-01 ENCOUNTER — OFFICE VISIT (OUTPATIENT)
Dept: BARIATRICS/WEIGHT MGMT | Age: 43
End: 2023-11-01
Payer: COMMERCIAL

## 2023-11-01 VITALS
HEART RATE: 103 BPM | SYSTOLIC BLOOD PRESSURE: 148 MMHG | HEIGHT: 64 IN | RESPIRATION RATE: 20 BRPM | TEMPERATURE: 97.2 F | WEIGHT: 244 LBS | DIASTOLIC BLOOD PRESSURE: 87 MMHG | BODY MASS INDEX: 41.66 KG/M2

## 2023-11-01 DIAGNOSIS — K45.8 INTERNAL HERNIA: Primary | ICD-10-CM

## 2023-11-01 PROCEDURE — 99211 OFF/OP EST MAY X REQ PHY/QHP: CPT

## 2023-11-01 PROCEDURE — 99024 POSTOP FOLLOW-UP VISIT: CPT | Performed by: SURGERY

## 2023-11-01 NOTE — PATIENT INSTRUCTIONS
Please continue to take your vitamin and mineral supplements as instructed. If you received a blood work prescription today for laboratory monitoring due prior to your next routine follow-up visit, please have this blood work obtained 10 to 14 days prior to your next visit. It is important to fast for 12 hours prior to routine weight loss surgery blood work, EXCEPT for drinking water, to ensure accuracy of results. Please report nausea, vomiting, abdominal pain, or any other problems you experience to your surgeon. For problems related to weight loss surgery, it is best to go to [facility] Department and have your surgeon paged.     Last Surgical Weight Loss:      11/1/2023     3:25 PM   Surgical Weight Loss Tracker   Consult Date 7/13/2016   Initial Height 5' 4\"   Initial Weight 298 lb   Initial BMI 51.15   Ideal Body Weight 151 lb   Surgery Date 8/14/2017   Pre-Surgical Height 5' 4\"   Pre-Surgical Weight 315 lb   Pre Surgery BMI 54.06   Weight to Lose 164 lb   Date 11/1/2023   Height 5' 4\"   Weight 244 lb   BMI 41.88   Weight Change -71 lb   Total Weight Change -71 lb   % EBWL 43%

## 2023-11-01 NOTE — PROGRESS NOTES
hernia repair with revision of GJ anastomosis. P: follow up as scheduled.     Navin Hermosillo MD  11/1/2023

## 2023-11-27 ENCOUNTER — OFFICE VISIT (OUTPATIENT)
Dept: ORTHOPEDIC SURGERY | Age: 43
End: 2023-11-27
Payer: COMMERCIAL

## 2023-11-27 VITALS — WEIGHT: 244 LBS | TEMPERATURE: 98 F | HEIGHT: 64 IN | BODY MASS INDEX: 41.66 KG/M2

## 2023-11-27 DIAGNOSIS — M25.811 IMPINGEMENT OF RIGHT SHOULDER: ICD-10-CM

## 2023-11-27 DIAGNOSIS — M25.511 ACUTE PAIN OF RIGHT SHOULDER: Primary | ICD-10-CM

## 2023-11-27 PROCEDURE — G8427 DOCREV CUR MEDS BY ELIG CLIN: HCPCS | Performed by: ORTHOPAEDIC SURGERY

## 2023-11-27 PROCEDURE — G8484 FLU IMMUNIZE NO ADMIN: HCPCS | Performed by: ORTHOPAEDIC SURGERY

## 2023-11-27 PROCEDURE — 99213 OFFICE O/P EST LOW 20 MIN: CPT | Performed by: ORTHOPAEDIC SURGERY

## 2023-11-27 PROCEDURE — 1036F TOBACCO NON-USER: CPT | Performed by: ORTHOPAEDIC SURGERY

## 2023-11-27 PROCEDURE — G8417 CALC BMI ABV UP PARAM F/U: HCPCS | Performed by: ORTHOPAEDIC SURGERY

## 2023-11-27 NOTE — PROGRESS NOTES
Skin: Skin is warm and dry. Psychiatric: Normal mood and affect.  Behavior is normal. Thought content normal.    CELESTINO Ta DO    11/27/23  1:24 PM

## 2023-12-07 ENCOUNTER — HOSPITAL ENCOUNTER (OUTPATIENT)
Dept: MRI IMAGING | Age: 43
Discharge: HOME | End: 2023-12-09
Attending: ORTHOPAEDIC SURGERY
Payer: COMMERCIAL

## 2023-12-07 DIAGNOSIS — M25.511 ACUTE PAIN OF RIGHT SHOULDER: ICD-10-CM

## 2023-12-07 DIAGNOSIS — M25.811 IMPINGEMENT OF RIGHT SHOULDER: ICD-10-CM

## 2023-12-07 PROCEDURE — 73221 MRI JOINT UPR EXTREM W/O DYE: CPT

## 2023-12-27 ENCOUNTER — TELEPHONE (OUTPATIENT)
Dept: CARDIOLOGY | Age: 43
End: 2023-12-27

## 2023-12-28 ENCOUNTER — HOSPITAL ENCOUNTER (OUTPATIENT)
Dept: CARDIOLOGY | Age: 43
Discharge: HOME | End: 2023-12-30
Attending: INTERNAL MEDICINE
Payer: COMMERCIAL

## 2023-12-28 VITALS
DIASTOLIC BLOOD PRESSURE: 90 MMHG | WEIGHT: 230 LBS | SYSTOLIC BLOOD PRESSURE: 110 MMHG | BODY MASS INDEX: 39.27 KG/M2 | HEIGHT: 64 IN

## 2023-12-28 DIAGNOSIS — R94.31 EKG, ABNORMAL: ICD-10-CM

## 2023-12-28 LAB
ECHO AV AREA PEAK VELOCITY: 2.1 CM2
ECHO AV AREA VTI: 2.6 CM2
ECHO AV AREA/BSA PEAK VELOCITY: 1 CM2/M2
ECHO AV AREA/BSA VTI: 1.3 CM2/M2
ECHO AV CUSP MM: 1.5 CM
ECHO AV MEAN GRADIENT: 6 MMHG
ECHO AV MEAN VELOCITY: 1.2 M/S
ECHO AV PEAK GRADIENT: 13 MMHG
ECHO AV PEAK VELOCITY: 1.8 M/S
ECHO AV VELOCITY RATIO: 0.56
ECHO AV VTI: 29.5 CM
ECHO BSA: 2.17 M2
ECHO EST RA PRESSURE: 3 MMHG
ECHO LA DIAMETER INDEX: 1.88 CM/M2
ECHO LA DIAMETER: 3.9 CM
ECHO LA VOL A-L A2C: 47 ML (ref 22–52)
ECHO LA VOL A-L A4C: 42 ML (ref 22–52)
ECHO LA VOL MOD A2C: 44 ML (ref 22–52)
ECHO LA VOL MOD A4C: 39 ML (ref 22–52)
ECHO LA VOLUME AREA LENGTH: 46 ML
ECHO LA VOLUME INDEX A-L A2C: 23 ML/M2 (ref 16–34)
ECHO LA VOLUME INDEX A-L A4C: 20 ML/M2 (ref 16–34)
ECHO LA VOLUME INDEX AREA LENGTH: 22 ML/M2 (ref 16–34)
ECHO LA VOLUME INDEX MOD A2C: 21 ML/M2 (ref 16–34)
ECHO LA VOLUME INDEX MOD A4C: 19 ML/M2 (ref 16–34)
ECHO LV FRACTIONAL SHORTENING: 33 % (ref 28–44)
ECHO LV INTERNAL DIMENSION DIASTOLE INDEX: 2.16 CM/M2
ECHO LV INTERNAL DIMENSION DIASTOLIC: 4.5 CM (ref 3.9–5.3)
ECHO LV INTERNAL DIMENSION SYSTOLIC INDEX: 1.44 CM/M2
ECHO LV INTERNAL DIMENSION SYSTOLIC: 3 CM
ECHO LV IVSD: 1.2 CM (ref 0.6–0.9)
ECHO LV MASS 2D: 198.1 G (ref 67–162)
ECHO LV MASS INDEX 2D: 95.2 G/M2 (ref 43–95)
ECHO LV POSTERIOR WALL DIASTOLIC: 1.2 CM (ref 0.6–0.9)
ECHO LV RELATIVE WALL THICKNESS RATIO: 0.53
ECHO LVOT AREA: 3.8 CM2
ECHO LVOT AV VTI INDEX: 0.7
ECHO LVOT DIAM: 2.2 CM
ECHO LVOT MEAN GRADIENT: 2 MMHG
ECHO LVOT PEAK GRADIENT: 4 MMHG
ECHO LVOT PEAK VELOCITY: 1 M/S
ECHO LVOT STROKE VOLUME INDEX: 37.8 ML/M2
ECHO LVOT SV: 78.6 ML
ECHO LVOT VTI: 20.7 CM
ECHO MV A VELOCITY: 0.96 M/S
ECHO MV AREA PHT: 2.7 CM2
ECHO MV AREA VTI: 2.3 CM2
ECHO MV E DECELERATION TIME (DT): 244.7 MS
ECHO MV E VELOCITY: 1.27 M/S
ECHO MV E/A RATIO: 1.32
ECHO MV LVOT VTI INDEX: 1.67
ECHO MV MAX VELOCITY: 1.3 M/S
ECHO MV MEAN GRADIENT: 3 MMHG
ECHO MV MEAN VELOCITY: 0.8 M/S
ECHO MV PEAK GRADIENT: 6 MMHG
ECHO MV PRESSURE HALF TIME (PHT): 80.2 MS
ECHO MV VTI: 34.5 CM
ECHO PV MAX VELOCITY: 1.2 M/S
ECHO PV MEAN GRADIENT: 3 MMHG
ECHO PV MEAN VELOCITY: 0.8 M/S
ECHO PV PEAK GRADIENT: 5 MMHG
ECHO PV VTI: 20.4 CM
ECHO RIGHT VENTRICULAR SYSTOLIC PRESSURE (RVSP): 18 MMHG
ECHO RV INTERNAL DIMENSION: 2.8 CM
ECHO RV TAPSE: 2.4 CM (ref 1.7–?)
ECHO TV REGURGITANT MAX VELOCITY: 1.92 M/S
ECHO TV REGURGITANT PEAK GRADIENT: 15 MMHG

## 2023-12-28 PROCEDURE — 93306 TTE W/DOPPLER COMPLETE: CPT

## 2024-01-08 ENCOUNTER — PREP FOR PROCEDURE (OUTPATIENT)
Dept: ORTHOPEDIC SURGERY | Age: 44
End: 2024-01-08

## 2024-01-08 ENCOUNTER — TELEPHONE (OUTPATIENT)
Dept: CARDIOLOGY CLINIC | Age: 44
End: 2024-01-08

## 2024-01-08 ENCOUNTER — TELEPHONE (OUTPATIENT)
Dept: ORTHOPEDIC SURGERY | Age: 44
End: 2024-01-08

## 2024-01-08 PROBLEM — M75.101 ROTATOR CUFF TEAR, RIGHT: Status: ACTIVE | Noted: 2024-01-08

## 2024-01-08 NOTE — TELEPHONE ENCOUNTER
Prior Authorization Form:      DEMOGRAPHICS:                     Patient Name:  Awa Pichardo  Patient :  1980            Insurance:  Payor: [place] [employer] PL[employer] / Plan: [employer] PLAN / Product Type: *No Product type* /   Insurance ID Number:    Payer/Plan Subscr  Sex Relation Sub. Ins. ID Effective Group Num   1. [place]* AWA PICHARDO 1980 Female Self [id number] 3/3/23                                    [address]   2. [employer]* AWA PICHARDO 1980 Female Self [id number] 1/1/15                                    [address]         DIAGNOSIS & PROCEDURE:                       Procedure/Operation: ARTHROSCOPIC EXAM AND TREATMENT RIGHT SHOULDER           CPT Code: 70113    Diagnosis:  ROTATOR CUFF TEAR RIGHT SHOULDER    ICD10 Code: M75.101    Location:  [facility]    Surgeon:  MATHIEU HODGES    SCHEDULING INFORMATION:                          Date: 2024    Time: TBA              Anesthesia:  Spinal                                                       Status:  Outpatient        Special Comments:  NONE       Electronically signed by Rosario Garcia on 2024 at 11:22 AM

## 2024-01-09 ENCOUNTER — TELEPHONE (OUTPATIENT)
Dept: CARDIOLOGY CLINIC | Age: 44
End: 2024-01-09

## 2024-01-09 NOTE — TELEPHONE ENCOUNTER
Pt called asking about echo results again . Pt stated her surgery was rescheduled because the surgeon told her it was abnormal. She would like to know If this is true. She would also like to know if she needs to make another appointment. Please advise.

## 2024-01-11 ENCOUNTER — TELEPHONE (OUTPATIENT)
Dept: CARDIOLOGY CLINIC | Age: 44
End: 2024-01-11

## 2024-01-11 NOTE — TELEPHONE ENCOUNTER
Vito Gillespie MD McGee, Shelia, MA Hi Shelia:  Would you please let her know that her echocardiogram is normal except for very mild thickening of the heart muscle which is common in people with high blood pressure  Thank you    Called patient gave results for echo.

## 2024-01-11 NOTE — TELEPHONE ENCOUNTER
Patient need cardiac clearance for shoulder arthroscopy rotator cuff repair. Date of surgery unknown. Last seen in office 10/10/2023.     Please advise

## 2024-01-12 NOTE — TELEPHONE ENCOUNTER
Patient is at acceptable risk for perioperative cardiovascular event for the planned procedure (arthroscopic rotator cuff repair), patient may proceed without any further cardiac testing.  Please feel free to call for any further information

## 2024-02-09 ENCOUNTER — HOSPITAL ENCOUNTER (EMERGENCY)
Age: 44
Discharge: LEFT BEFORE DISCHARGE | End: 2024-02-09
Attending: EMERGENCY MEDICINE

## 2024-02-09 VITALS
WEIGHT: 225 LBS | RESPIRATION RATE: 20 BRPM | OXYGEN SATURATION: 99 % | BODY MASS INDEX: 38.62 KG/M2 | SYSTOLIC BLOOD PRESSURE: 177 MMHG | HEART RATE: 106 BPM | DIASTOLIC BLOOD PRESSURE: 77 MMHG | TEMPERATURE: 97.7 F

## 2024-02-09 DIAGNOSIS — D64.9 CHRONIC ANEMIA: ICD-10-CM

## 2024-02-09 DIAGNOSIS — R06.00 DYSPNEA, UNSPECIFIED TYPE: Primary | ICD-10-CM

## 2024-02-09 LAB
ABO + RH BLD: NORMAL
ALBUMIN SERPL-MCNC: 4.1 G/DL (ref 3.5–5.2)
ALP SERPL-CCNC: 136 U/L (ref 35–104)
ALT SERPL-CCNC: 19 U/L (ref 0–32)
ANION GAP SERPL CALCULATED.3IONS-SCNC: 13 MMOL/L (ref 7–16)
ARM BAND NUMBER: NORMAL
AST SERPL-CCNC: 24 U/L (ref 0–31)
BASOPHILS # BLD: 0.03 K/UL (ref 0–0.2)
BASOPHILS NFR BLD: 1 % (ref 0–2)
BILIRUB SERPL-MCNC: 0.2 MG/DL (ref 0–1.2)
BLOOD BANK SAMPLE EXPIRATION: NORMAL
BLOOD GROUP ANTIBODIES SERPL: NEGATIVE
BUN SERPL-MCNC: 10 MG/DL (ref 6–20)
CALCIUM SERPL-MCNC: 8.6 MG/DL (ref 8.6–10.2)
CHLORIDE SERPL-SCNC: 108 MMOL/L (ref 98–107)
CO2 SERPL-SCNC: 19 MMOL/L (ref 22–29)
CREAT SERPL-MCNC: 0.8 MG/DL (ref 0.5–1)
EOSINOPHIL # BLD: 0.07 K/UL (ref 0.05–0.5)
EOSINOPHILS RELATIVE PERCENT: 2 % (ref 0–6)
ERYTHROCYTE [DISTWIDTH] IN BLOOD BY AUTOMATED COUNT: 19.9 % (ref 11.5–15)
GFR SERPL CREATININE-BSD FRML MDRD: >60 ML/MIN/1.73M2
GLUCOSE SERPL-MCNC: 85 MG/DL (ref 74–99)
HCG, URINE, POC: NEGATIVE
HCT VFR BLD AUTO: 26.9 % (ref 34–48)
HGB BLD-MCNC: 8.3 G/DL (ref 11.5–15.5)
IMM GRANULOCYTES # BLD AUTO: <0.03 K/UL (ref 0–0.58)
IMM GRANULOCYTES NFR BLD: 0 % (ref 0–5)
LYMPHOCYTES NFR BLD: 1.62 K/UL (ref 1.5–4)
LYMPHOCYTES RELATIVE PERCENT: 34 % (ref 20–42)
Lab: NORMAL
MCH RBC QN AUTO: 22 PG (ref 26–35)
MCHC RBC AUTO-ENTMCNC: 30.9 G/DL (ref 32–34.5)
MCV RBC AUTO: 71.4 FL (ref 80–99.9)
MONOCYTES NFR BLD: 0.48 K/UL (ref 0.1–0.95)
MONOCYTES NFR BLD: 10 % (ref 2–12)
NEGATIVE QC PASS/FAIL: NORMAL
NEUTROPHILS NFR BLD: 54 % (ref 43–80)
NEUTS SEG NFR BLD: 2.57 K/UL (ref 1.8–7.3)
PLATELET # BLD AUTO: 380 K/UL (ref 130–450)
PMV BLD AUTO: 8.9 FL (ref 7–12)
POSITIVE QC PASS/FAIL: NORMAL
POTASSIUM SERPL-SCNC: 3.6 MMOL/L (ref 3.5–5)
PROT SERPL-MCNC: 7 G/DL (ref 6.4–8.3)
RBC # BLD AUTO: 3.77 M/UL (ref 3.5–5.5)
SODIUM SERPL-SCNC: 140 MMOL/L (ref 132–146)
WBC OTHER # BLD: 4.8 K/UL (ref 4.5–11.5)

## 2024-02-09 PROCEDURE — 86900 BLOOD TYPING SEROLOGIC ABO: CPT

## 2024-02-09 PROCEDURE — 80053 COMPREHEN METABOLIC PANEL: CPT

## 2024-02-09 PROCEDURE — 86901 BLOOD TYPING SEROLOGIC RH(D): CPT

## 2024-02-09 PROCEDURE — 85025 COMPLETE CBC W/AUTO DIFF WBC: CPT

## 2024-02-09 PROCEDURE — 99283 EMERGENCY DEPT VISIT LOW MDM: CPT

## 2024-02-09 PROCEDURE — 86850 RBC ANTIBODY SCREEN: CPT

## 2024-02-09 ASSESSMENT — LIFESTYLE VARIABLES: HOW OFTEN DO YOU HAVE A DRINK CONTAINING ALCOHOL: NEVER

## 2024-02-09 NOTE — ED NOTES
Department of Emergency Medicine  FIRST PROVIDER TRIAGE NOTE             Independent MLP           2/9/24  4:19 PM EST    Date of Encounter: 2/9/24   MRN: [id number]      HPI: Awa Voss is a 43 y.o. female who presents to the ED for No chief complaint on file.   sob no recent uri . Hx of anemia     ROS: Negative for fever or cough.    PE: Gen Appearance/Constitutional: alert  CV: regular rate  Pulm: CTA bilat     Initial Plan of Care: All treatment areas with department are currently occupied. Plan to order/Initiate the following while awaiting opening in ED: labs.  Initiate Treatment-Testing, Proceed toTreatment Area When Bed Available for ED Attending/MLP to Continue Care    Electronically signed by PALOMA Appiah   DD: 2/9/24

## 2024-02-10 NOTE — ED PROVIDER NOTES
LABS:  Results for orders placed or performed during the hospital encounter of 02/09/24   CBC with Auto Differential   Result Value Ref Range    WBC 4.8 4.5 - 11.5 k/uL    RBC 3.77 3.50 - 5.50 m/uL    Hemoglobin 8.3 (L) 11.5 - 15.5 g/dL    Hematocrit 26.9 (L) 34.0 - 48.0 %    MCV 71.4 (L) 80.0 - 99.9 fL    MCH 22.0 (L) 26.0 - 35.0 pg    MCHC 30.9 (L) 32.0 - 34.5 g/dL    RDW 19.9 (H) 11.5 - 15.0 %    Platelets 380 130 - 450 k/uL    MPV 8.9 7.0 - 12.0 fL    Neutrophils % 54 43.0 - 80.0 %    Lymphocytes % 34 20.0 - 42.0 %    Monocytes % 10 2.0 - 12.0 %    Eosinophils % 2 0 - 6 %    Basophils % 1 0.0 - 2.0 %    Immature Granulocytes 0 0.0 - 5.0 %    Neutrophils Absolute 2.57 1.80 - 7.30 k/uL    Lymphocytes Absolute 1.62 1.50 - 4.00 k/uL    Monocytes Absolute 0.48 0.10 - 0.95 k/uL    Eosinophils Absolute 0.07 0.05 - 0.50 k/uL    Basophils Absolute 0.03 0.00 - 0.20 k/uL    Absolute Immature Granulocyte <0.03 0.00 - 0.58 k/uL   CMP   Result Value Ref Range    Sodium 140 132 - 146 mmol/L    Potassium 3.6 3.5 - 5.0 mmol/L    Chloride 108 (H) 98 - 107 mmol/L    CO2 19 (L) 22 - 29 mmol/L    Anion Gap 13 7 - 16 mmol/L    Glucose 85 74 - 99 mg/dL    BUN 10 6 - 20 mg/dL    Creatinine 0.8 0.50 - 1.00 mg/dL    Est, Glom Filt Rate >60 >60 mL/min/1.73m2    Calcium 8.6 8.6 - 10.2 mg/dL    Total Protein 7.0 6.4 - 8.3 g/dL    Albumin 4.1 3.5 - 5.2 g/dL    Total Bilirubin 0.2 0.0 - 1.2 mg/dL    Alkaline Phosphatase 136 (H) 35 - 104 U/L    ALT 19 0 - 32 U/L    AST 24 0 - 31 U/L   POC Pregnancy Urine Qual   Result Value Ref Range    HCG, Urine, POC Negative Negative    Lot Number [id number]     Positive QC Pass/Fail Pass     Negative QC Pass/Fail Pass    TYPE AND SCREEN   Result Value Ref Range    Blood Bank Sample Expiration 02/12/2024,2359     Arm Band Number [id number]     ABO/Rh O POSITIVE     Antibody Screen NEGATIVE        RADIOLOGY:   Interpretation per the Radiologist below, if available at the time of this note:    No orders to  history, exam, medical decision making, and procedures and agree with all pertinent clinical information unless otherwise noted.    I have also reviewed and agree with the past medical, family and social history unless otherwise noted.    I have discussed this patient in detail with the resident and provided the instruction and education regarding the evidence-based evaluation and treatment of fatifue.    Any EKG that may have been performed has been personally reviewed by me and I agree with the documentation as noted by the resident.    History: Patient presents to the ED for evaluation of fatigue and dyspnea.  Symptoms are worse with any type of exertion.  She states last time this happened she was anemic.  Patient is on iron therapy and has history of iron deficiency anemia.  She has noticed that she has been having some black tarry stools.  Denies any chest pain.  Denies fever or chills.  No reported dizziness or lightness.  No syncope.  Patient states that during her last admission she required  a blood transfusion.    My findings: Awa Voss is a 43 y.o. female whom is in no distress. Physical exam reveals patient lying bed comfortably.  Heart regular in rhythm.  Lungs clear to auscultation.  No diaphoresis or pallor.  Patient does have mild conjunctival pallor.  Abdomen soft nontender    My plan: Symptomatic and supportive care.  Appropriate labs and imaging    Electronically signed by Reginald Merino DO on 2/9/24 at 5:53 PM EST       [MS]   1856 CMP(!):    Sodium 140   Potassium 3.6   Chloride 108(!)   CO2 19(!)   Anion Gap 13   Glucose, Random 85   BUN,BUNPL 10   Creatinine 0.8   Est, Glom Filt Rate >60   CALCIUM, SERUM, 573956 8.6   Total Protein 7.0   Albumin 4.1   BILIRUBIN TOTAL 0.2   Alk Phos 136(!)   ALT 19   AST 24 [PP]   1856 CBC with Auto Differential(!):    WBC 4.8   RBC 3.77   Hemoglobin Quant 8.3(!)   Hematocrit 26.9(!)   MCV 71.4(!)   MCH 22.0(!)   MCHC 30.9(!)   RDW 19.9(!)   Platelet

## 2024-02-29 ENCOUNTER — APPOINTMENT (OUTPATIENT)
Dept: GENERAL RADIOLOGY | Age: 44
End: 2024-02-29
Payer: COMMERCIAL

## 2024-02-29 ENCOUNTER — HOSPITAL ENCOUNTER (EMERGENCY)
Age: 44
Discharge: HOME | End: 2024-02-29
Attending: FAMILY MEDICINE
Payer: COMMERCIAL

## 2024-02-29 VITALS
HEIGHT: 64 IN | HEART RATE: 87 BPM | WEIGHT: 225 LBS | TEMPERATURE: 99 F | SYSTOLIC BLOOD PRESSURE: 144 MMHG | DIASTOLIC BLOOD PRESSURE: 84 MMHG | OXYGEN SATURATION: 100 % | RESPIRATION RATE: 16 BRPM | BODY MASS INDEX: 38.41 KG/M2

## 2024-02-29 DIAGNOSIS — S46.911A STRAIN OF RIGHT SHOULDER, INITIAL ENCOUNTER: ICD-10-CM

## 2024-02-29 DIAGNOSIS — S92.334A CLOSED NONDISPLACED FRACTURE OF THIRD METATARSAL BONE OF RIGHT FOOT, INITIAL ENCOUNTER: Primary | ICD-10-CM

## 2024-02-29 DIAGNOSIS — S16.1XXA STRAIN OF NECK MUSCLE, INITIAL ENCOUNTER: ICD-10-CM

## 2024-02-29 DIAGNOSIS — S92.344A CLOSED NONDISPLACED FRACTURE OF FOURTH METATARSAL BONE OF RIGHT FOOT, INITIAL ENCOUNTER: ICD-10-CM

## 2024-02-29 PROCEDURE — 6370000000 HC RX 637 (ALT 250 FOR IP): Performed by: FAMILY MEDICINE

## 2024-02-29 PROCEDURE — 73030 X-RAY EXAM OF SHOULDER: CPT

## 2024-02-29 PROCEDURE — 99283 EMERGENCY DEPT VISIT LOW MDM: CPT

## 2024-02-29 PROCEDURE — 73630 X-RAY EXAM OF FOOT: CPT

## 2024-02-29 PROCEDURE — 72040 X-RAY EXAM NECK SPINE 2-3 VW: CPT

## 2024-02-29 RX ORDER — ACETAMINOPHEN 500 MG
1000 TABLET ORAL ONCE
Status: COMPLETED | OUTPATIENT
Start: 2024-02-29 | End: 2024-02-29

## 2024-02-29 RX ORDER — TRAMADOL HYDROCHLORIDE 50 MG/1
50 TABLET ORAL EVERY 8 HOURS PRN
Qty: 9 TABLET | Refills: 0 | Status: SHIPPED | OUTPATIENT
Start: 2024-02-29 | End: 2024-03-03

## 2024-02-29 RX ADMIN — ACETAMINOPHEN 1000 MG: 500 TABLET ORAL at 12:47

## 2024-02-29 ASSESSMENT — PAIN - FUNCTIONAL ASSESSMENT: PAIN_FUNCTIONAL_ASSESSMENT: NONE - DENIES PAIN

## 2024-02-29 NOTE — ED PROVIDER NOTES
---------------------------   The nursing notes within the ED encounter and vital signs as below have been reviewed.   BP (!) 144/84   Pulse 87   Temp 99 °F (37.2 °C)   Resp 16   Ht 1.626 m (5' 4\")   Wt 102.1 kg (225 lb)   LMP 02/01/2024   SpO2 100%   BMI 38.62 kg/m²   Oxygen Saturation Interpretation: Normal      ---------------------------------------------------PHYSICAL EXAM--------------------------------------    Constitutional/General: Alert and oriented x3, well appearing, non toxic in NAD  Head: NC/AT  Eyes: PERRL, EOMI  Mouth: Oropharynx clear, handling secretions, no trismus  Neck: Supple, full ROM, no meningeal signs  Pulmonary: Lungs clear to auscultation bilaterally, no wheezes, rales, or rhonchi. Not in respiratory distress  Cardiovascular:  Regular rate and rhythm, no murmurs, gallops, or rubs. 2+ distal pulses  Abdomen: Soft, non tender, non distended,   Extremities: Moves all extremities x 4. Warm and well perfused  Skin: warm and dry without rash  Neurologic: GCS 15,  Psych: Normal Affect      ------------------------------ ED COURSE/MEDICAL DECISION MAKING----------------------  Medications   acetaminophen (TYLENOL) tablet 1,000 mg (1,000 mg Oral Given 2/29/24 1247)         Medical Decision Making:    X-ray right foot showed proximal third and fourth metatarsal fractures.  Patient will be referred to podiatry.    For the other injury she will be referred to occupational health.    Counseling:   The emergency provider has spoken with the patient and discussed today’s results, in addition to providing specific details for the plan of care and counseling regarding the diagnosis and prognosis.  Questions are answered at this time and they are agreeable with the plan.      --------------------------------- IMPRESSION AND DISPOSITION ---------------------------------    IMPRESSION  1. Closed nondisplaced fracture of third metatarsal bone of right foot, initial encounter    2. Closed nondisplaced

## 2024-04-03 NOTE — PROGRESS NOTES
[facility]  Physical Therapy Daily Treatment Note    Date: 2024  Patient Name: Awa Voss  : 1980   MRN: [id number]  DOInjury: 2024  DOSx: na   Referring Provider: Henrik Albarran DO  [address] [place] [address]     Medical Diagnosis:  s46.911a shoulder strain, s16.1xxa cerv strain       Outcome Measure:NDI= 34, 68%    S: See eval  O: Pt given written HEP  Time 2684-2292 3x/wk, 4 weeks    Visit  Repeat outcome measure at mid point and end.    Pain 7/10     ROM Limited right shoulder flex and cerv SB L     Modalities      MH ES C/S and shoulders       THEREX     UBE            Shrugs      Shld flex      Shldr abd      Overhead press       Pull downs      ROWS: M      ROWS: L      ER      IR       Functional activities To aid in ROM and strength needed for reaching , lifting ,pushing and pulling at home/work    Diag down       Diag up  \"      \"      \"      \"                A:  Tolerated well.  Above added to written HEP.  P: Continue with rehab plan  Henrik Chavira PT  Treatment Charges: TIMED Mins Time in Time out Units    12715 Initial Evaluation 37 0900 0937 1   80190 Ther Exercise         TE  8 0937 0945 1    81981 Manual Therapy     MT       85160 Ther Activities        TA       42520 Gait Training          GT       85729 Neuro Re-education NR       Non-Billable Service Time       Total Time/Timed Units 45 0900 0945 2         TREATMENT CHARGES : UNTIMED UNITS   15594 E- stim    85730 traction    94267     Total UNTIMED Units

## 2024-04-03 NOTE — PROGRESS NOTES
[facility] OUTPATIENT REHABILITATION  PHYSICAL THERAPY INITIAL EVALUATION         Date:  2024   Patient: Awa Voss  : 1980  MRN: [id number]  Referring Provider: Henrik Albarran DO [address] [place] [address]     Medical Diagnosis:  s46.911a shoulder strain, s16.1xxa cerv strain     Onset date: 24  Mechanism of Injury: Toe fell down on her while at work  Chief complaint: Intermittent left sided neck pain and right shoulder pain. Limited ROM of C/S and right shoulder. Right hand dominant     SUBJECTIVE:     Past Medical History  Past Medical History:   Diagnosis Date    Arthritis     COVID     SOB ever since, on inhaler, improving per pt    Hypertension     during pregnancy    Iron deficiency anemia due to chronic blood loss 2023    Iron deficiency anemia due to chronic blood loss 2023    Obesity (BMI 35.0-39.9 without comorbidity) 2023    Primary hypertension 2023     Past Surgical History:   Procedure Laterality Date    ABDOMEN SURGERY       SECTION  ,,,,    DILATION AND CURETTAGE OF UTERUS N/A 2021    IUD REMOVAL (ASHLY) (CPT 23548) performed by Denis Colin MD at [facility] OR    HIATAL HERNIA REPAIR N/A 2020    LAPAROSCOPIC HIATAL HERNIA REPAIR WITH MESH, TRUNCAL VAGOTOMY performed by Patrick Sam MD at [facility] OR    LAPAROSCOPY N/A 10/24/2023    LAPAROSCOPY DIAGNOSTIC diagnostic laparoscopy with reduction and closure of internal hernia and possible revision of GJ anastomosis performed by Patrick Sam MD at [facility] OR    KATRINA-EN-Y GASTRIC BYPASS  2017    Laparascopic    UPPER GASTROINTESTINAL ENDOSCOPY  2016    Dr. Sam    UPPER GASTROINTESTINAL ENDOSCOPY N/A 10/9/2020    EGD BIOPSY performed by Patrick Sam MD at [facility] ENDOSCOPY    UPPER GASTROINTESTINAL ENDOSCOPY N/A 2023    EGD ESOPHAGOGASTRODUODENOSCOPY performed by John Gonzalez MD at [facility] ENDOSCOPY    Reading TOOTH

## 2024-04-04 ENCOUNTER — HOSPITAL ENCOUNTER (OUTPATIENT)
Dept: PHYSICAL THERAPY | Age: 44
Setting detail: THERAPIES SERIES
Discharge: HOME | End: 2024-04-04
Payer: COMMERCIAL

## 2024-04-04 PROCEDURE — 97162 PT EVAL MOD COMPLEX 30 MIN: CPT | Performed by: PHYSICAL THERAPIST

## 2024-04-04 PROCEDURE — 97110 THERAPEUTIC EXERCISES: CPT | Performed by: PHYSICAL THERAPIST

## 2024-04-08 ENCOUNTER — HOSPITAL ENCOUNTER (OUTPATIENT)
Dept: PHYSICAL THERAPY | Age: 44
Setting detail: THERAPIES SERIES
Discharge: HOME | End: 2024-04-08
Payer: COMMERCIAL

## 2024-04-08 PROCEDURE — G0283 ELEC STIM OTHER THAN WOUND: HCPCS | Performed by: PHYSICAL THERAPIST

## 2024-04-08 PROCEDURE — 97110 THERAPEUTIC EXERCISES: CPT | Performed by: PHYSICAL THERAPIST

## 2024-04-08 NOTE — PROGRESS NOTES
[facility]  Physical Therapy Daily Treatment Note    Date: 2024  Patient Name: Awa Voss  : 1980   MRN: [id number]  DOInjury: 2024  DOSx: na   Referring Provider: Henrik Albarran DO  [address] [place] [address]     Medical Diagnosis:  s46.911a shoulder strain, s16.1xxa cerv strain       Outcome Measure:NDI= 34, 68%    S: Pt reports trying HEP \" a little\". Pt appears very fatigued.   O:    Time 2446-4347 3x/wk, 4 weeks    Visit  Repeat outcome measure at mid point and end.    Pain 9.5/10     ROM Limited right shoulder flex and cerv SB L     Modalities      MH ES C/S and shoulders       THEREX     UBE      Pulleys shoulder flex X 3 min      Shrugs      Shld flex      Shldr abd      Overhead press       Pull downs      ROWS: M R x 20     ROWS: L      ER      IR      Supine chest press wand 0# x 2 min     Supine shoulder flexion wand 0# x 2 min     Supine IR/ER wand 0# x 2 min                                    Functional activities To aid in ROM and strength needed for reaching , lifting ,pushing and pulling at home/work    Diag down       Diag up  \"      \"      \"      \"                A:  Tolerated poor.  Pt has difficulty performing even the simplest therex. Pt having difficulty moving through minimal ROM therex.   P: Continue with rehab plan  Henrik Chavira, PT  Treatment Charges: TIMED Mins Time in Time out Units    30119 Initial Evaluation        08722 Ther Exercise         TE   0930 0957 2   72579 Manual Therapy     MT       01536 Ther Activities        TA       26615 Gait Training          GT       24102 Neuro Re-education NR       Non-Billable Service Time       Total Time/Timed Units  0939 0957 2         TREATMENT CHARGES : UNTIMED UNITS   36443 E- stim 1   94696 traction    33310     Total UNTIMED Units

## 2024-04-12 ENCOUNTER — HOSPITAL ENCOUNTER (OUTPATIENT)
Dept: PHYSICAL THERAPY | Age: 44
Setting detail: THERAPIES SERIES
Discharge: HOME | End: 2024-04-12
Payer: COMMERCIAL

## 2024-04-12 PROCEDURE — G0283 ELEC STIM OTHER THAN WOUND: HCPCS

## 2024-04-12 PROCEDURE — 97110 THERAPEUTIC EXERCISES: CPT

## 2024-04-12 NOTE — PROGRESS NOTES
[facility]  Physical Therapy Daily Treatment Note  Date: 2024  Patient Name: Awa Voss  : 1980   MRN: [id number]  DOInjury: 2024  DOSx: na   Referring Provider: Henrik Albarran DO  [address] [place] [address]     Medical Diagnosis:  s46.911a shoulder strain, s16.1xxa cerv strain     Outcome Measure:NDI= 34, 68%    S: Pt reports continued pain in right cervical and shoulder. Pain with movement of RUE limiting function.  O:  Patient arrived late, she thought her appointment was at a different time. Other patient cancelled so was able to treat.  Time 4878-3787 3x/wk, 4 weeks   Visit 3/12 Repeat outcome measure at mid point and end.   Pain 9.5/10    ROM Limited right shoulder flex and cerv SB L    Modalities     MH ES C/S and shoulders X 15 min    THEREX     UBE L2 x 2 min FWD  L2 x 2 min Retro    Pulleys shoulder flex X 3 min     Shrugs     Shld flex     Shldr abd     Overhead press      Pull downs Red x 20    ROWS: M R x 20    ROWS: L R x 20    ER     IR     Supine chest press wand 0# x 3 min    Supine shoulder flexion wand  UA    Supine IR/ER wand 0# x 2 min              Functional activities To aid in ROM and strength needed for reaching , lifting ,pushing and pulling at home/work    Diag down      Diag up               A:  Tolerated poorly.  Pt has difficulty performing even the simplest therex. Pt having difficulty moving through minimal ROM therex even when assisted.   P: Continue with rehab plan.  Uma Viera PTA    Treatment Charges: TIMED Mins Time in Time out Units    84761 Initial Evaluation        66501 Ther Exercise         TE  36 1000 1036 2   16517 Manual Therapy     MT       43396 Ther Activities        TA       10170 Gait Training          GT       41409 Neuro Re-education NR       Non-Billable Service Time       Total Time/Timed Units 36 1000 1036 2         TREATMENT CHARGES : UNTIMED UNITS   80446 E- stim 1   80615 traction    40307

## 2024-04-15 ENCOUNTER — HOSPITAL ENCOUNTER (OUTPATIENT)
Dept: PHYSICAL THERAPY | Age: 44
Setting detail: THERAPIES SERIES
Discharge: HOME | End: 2024-04-15
Payer: COMMERCIAL

## 2024-04-15 PROCEDURE — 97110 THERAPEUTIC EXERCISES: CPT | Performed by: PHYSICAL THERAPIST

## 2024-04-15 PROCEDURE — G0283 ELEC STIM OTHER THAN WOUND: HCPCS | Performed by: PHYSICAL THERAPIST

## 2024-04-15 NOTE — PROGRESS NOTES
[facility]  Physical Therapy Daily Treatment Note  Date: 4/15/2024  Patient Name: Awa Voss  : 1980   MRN: [id number]  DOInjury: 2024  DOSx: na   Referring Provider: Henrik Albarran DO  [address] [place] [address]     Medical Diagnosis:  s46.911a shoulder strain, s16.1xxa cerv strain     Outcome Measure:NDI= 34, 68%    S: Pt reports continued pain in right cervical and shoulder. Pain with movement of RUE limiting function. Better today than it has been.   O:    Time 9595-3030 3x/wk, 4 weeks   Visit 3/12 Repeat outcome measure at mid point and end.   Pain 8/10    ROM Limited right shoulder flex and cerv SB L    Modalities     MH ES C/S and shoulders X 15 min    THEREX     UBE L2 x 2 min FWD  L2 x 2 min Retro    Pulleys shoulder flex X 3 min     Shrugs     Shld flex     Shldr abd     Overhead press      Pull downs Red x 20    ROWS: M R x 20    ROWS: L R x 20    ER     IR     Supine chest press wand 0# x 3 min    Supine shoulder flexion wand 0# x 2 min      Supine IR/ER wand 0# x 2 min              Functional activities To aid in ROM and strength needed for reaching , lifting ,pushing and pulling at home/work    Diag down      Diag up               A:  Tolerated poorly.  Pt has difficulty performing even the simplest therex.  Needs reminded to attempt to move through full within tolerance.   P: Continue with rehab plan.  Henrik Chavira, PT    Treatment Charges: TIMED Mins Time in Time out Units    75452 Initial Evaluation        15590 Ther Exercise         TE  30 43 1013 2   24752 Manual Therapy     MT       12229 Ther Activities        TA       43252 Gait Training          GT       65410 Neuro Re-education NR       Non-Billable Service Time       Total Time/Timed Units 30 0943 1013 2         TREATMENT CHARGES : UNTIMED UNITS   35107 E- stim 1   06356 traction    30785     Total UNTIMED Units 1

## 2024-04-19 ENCOUNTER — HOSPITAL ENCOUNTER (OUTPATIENT)
Dept: PHYSICAL THERAPY | Age: 44
Setting detail: THERAPIES SERIES
Discharge: HOME | End: 2024-04-19
Payer: COMMERCIAL

## 2024-04-19 PROCEDURE — 97110 THERAPEUTIC EXERCISES: CPT

## 2024-04-19 PROCEDURE — G0283 ELEC STIM OTHER THAN WOUND: HCPCS

## 2024-04-19 NOTE — PROGRESS NOTES
[facility]  Physical Therapy Daily Treatment Note  Date: 2024  Patient Name: Awa Voss  : 1980   MRN: [id number]  DOInjury: 2024  DOSx: na   Referring Provider: Henrik Albarran DO  [address] [place] [address]     Medical Diagnosis:  s46.911a shoulder strain, s16.1xxa cerv strain     Outcome Measure:NDI= 34, 68%    S: Pt reports continued pain in cervical and right shoulder, but slight improvement. CC right anterior shoulder pain. Pain limiting functional status, tolerates limiting ROM.  O:    Time 9794-8869 3x/wk, 4 weeks   Visit  Repeat outcome measure at mid point and end.   Pain 9/10    ROM Limited right shoulder flex and cerv SB L    Modalities     MH & ES to C/S and shoulders X 15 min ES: across neck into R shldr   THEREX     UBE L2 x 2 min FWD  L2 x 2 min Retro    Pulleys shoulder flex X 3 min     Shrugs     Shld flex     Shldr abd     Overhead press      Pull downs Red x 20    ROWS: M R x 20    Low punch R x 15 Right, as tolerated    ROWS: L R x 15    ER     IR     Supine chest press wand 0# x 3 min    Supine shoulder flexion wand 0# x 2 min      Supine IR/ER wand 0# x 2 min              Functional activities To aid in ROM and strength needed for reaching , lifting ,pushing and pulling at home/work    Diag down      Diag up               A:  Tolerated fairly.  Some relief of pain after session.   P: Continue with rehab plan.  Uma Viera PTA    Treatment Charges: TIMED Mins Time in Time out Units    01870 Initial Evaluation        27949 Ther Exercise         TE  23 1040 1103 2   13100 Manual Therapy     MT       21551 Ther Activities        TA       98387 Gait Training          GT       56475 Neuro Re-education NR       Non-Billable Service Time 2  5 1038  1103 1040  1108    Total Time/Timed Units 30 1038 1108 2         TREATMENT CHARGES : UNTIMED UNITS   50623 E- stim 1   80878 traction    12301     Total UNTIMED Units 1

## 2024-04-22 ENCOUNTER — HOSPITAL ENCOUNTER (OUTPATIENT)
Dept: PHYSICAL THERAPY | Age: 44
Setting detail: THERAPIES SERIES
Discharge: HOME | End: 2024-04-22
Payer: COMMERCIAL

## 2024-04-22 PROCEDURE — 97110 THERAPEUTIC EXERCISES: CPT

## 2024-04-22 PROCEDURE — G0283 ELEC STIM OTHER THAN WOUND: HCPCS

## 2024-04-22 NOTE — PROGRESS NOTES
[facility]  Physical Therapy Daily Treatment Note  Date: 2024  Patient Name: Awa Voss  : 1980   MRN: [id number]  DOInjury: 2024  DOSx: na   Referring Provider: Henrik Albarran DO  [address] [place] [address]     Medical Diagnosis:  s46.911a shoulder strain, s16.1xxa cerv strain     Outcome Measure:NDI= 34, 68%    S: Pt reports continued pain in cervical and right shoulder, but slight improvement. She has been trying to stretch more after we talked about need to prevent frozen shoulder.  O:    Time 5300-1967 3x/wk, 4 weeks   Visit  Repeat outcome measure at mid point and end.   Pain 7/10    ROM Limited right shoulder flex and cerv SB L    Modalities     MH & ES to C/S and shoulders X 15 min ES: across neck into R shldr   THEREX     UBE L2 x 2 min FWD  L2 x 2 min Retro    Pulleys shoulder flex X 3 min     Shrugs     Shld flex 1# DB 2 x 10    Shldr abd     Overhead press 0# wand x 2 min     Pull downs Red x 20    ROWS: M R x 20    Low punch R x 15 Right    ROWS: L R 2 x 10    ER Red 2 x 10 right    IR Red 2 x 10 right    Supine chest press wand    Supine shoulder flexion wand    Supine IR/ER wand              Functional activities To aid in ROM and strength needed for reaching , lifting ,pushing and pulling at home/work    Diag down      Diag up               A:  Tolerated fairly.  Progressed to more upright, functional exercises. Some relief of pain after session.   P: Continue with rehab plan.  Uma Viera PTA    Treatment Charges: TIMED Mins Time in Time out Units    96868 Initial Evaluation        81308 Ther Exercise         TE  26 0935 1001 2   59735 Manual Therapy     MT       27897 Ther Activities        TA       57319 Gait Training          GT       97753 Neuro Re-education NR       Non-Billable Service Time       Total Time/Timed Units 26 0935 1001 2         TREATMENT CHARGES : UNTIMED UNITS   47229 E- stim 1   77405 traction    70615 US    Total

## 2024-04-24 ENCOUNTER — HOSPITAL ENCOUNTER (OUTPATIENT)
Dept: PHYSICAL THERAPY | Age: 44
Setting detail: THERAPIES SERIES
Discharge: HOME | End: 2024-04-24
Payer: COMMERCIAL

## 2024-04-24 PROCEDURE — G0283 ELEC STIM OTHER THAN WOUND: HCPCS

## 2024-04-24 PROCEDURE — 97110 THERAPEUTIC EXERCISES: CPT

## 2024-04-24 NOTE — PROGRESS NOTES
[facility]  Physical Therapy Daily Treatment Note  Date: 2024  Patient Name: Awa Voss  : 1980   MRN: [id number]  DOInjury: 2024  DOSx: na   Referring Provider: Henrik Albarran DO  [address] [place] [address]     Medical Diagnosis:  s46.911a shoulder strain, s16.1xxa cerv strain     Outcome Measure:NDI= 34, 68%    S: Pt reports continued pain in cervical and right shoulder, but slight improvement. She has been trying to stretch more after we talked about need to prevent frozen shoulder.  O:    Time 4247-1528 3x/wk, 4 weeks   Visit  Repeat outcome measure at mid point and end.   Pain 7/10    ROM Limited right shoulder flex and cerv SB L    Modalities     MH & ES to C/S and shoulders X 15 min ES: across neck into R shldr   THEREX     UBE L3 x 2 min FWD  L3 x 2 min Retro    Pulleys shoulder flex X 3 min     Shrugs     Shld flex 1# DB 2 x 10    Shldr abd     Overhead press 0# wand x 2 min     Pull downs Red x 20    ROWS: M R x 20    Low punch R 2 x 10 Right    ROWS: L R 2 x 10    ER Red 2 x 10 right    IR Red 2 x 10 right    Supine chest press wand     Supine shoulder flexion wand     Supine IR/ER wand               Functional activities To aid in ROM and strength needed for reaching , lifting ,pushing and pulling at home/work    Diag down      Diag up               A:  Tolerated well, demonstrates improved ROM, strength and functional status.   P: Continue with rehab plan.  Uma Viera PTA    Treatment Charges: TIMED Mins Time in Time out Units    05292 Initial Evaluation        55578 Ther Exercise         TE  24 0935 1001 2   38807 Manual Therapy     MT       28753 Ther Activities        TA       66849 Gait Training          GT       05749 Neuro Re-education NR       Non-Billable Service Time       Total Time/Timed Units 24 1035 0959 2         TREATMENT CHARGES : UNTIMED UNITS   90934 E- stim 1   89941 traction    66918     Total UNTIMED Units 1

## 2024-04-26 ENCOUNTER — HOSPITAL ENCOUNTER (OUTPATIENT)
Dept: PHYSICAL THERAPY | Age: 44
Setting detail: THERAPIES SERIES
Discharge: HOME | End: 2024-04-26
Payer: COMMERCIAL

## 2024-04-26 PROCEDURE — G0283 ELEC STIM OTHER THAN WOUND: HCPCS

## 2024-04-26 PROCEDURE — 97110 THERAPEUTIC EXERCISES: CPT

## 2024-04-26 NOTE — PROGRESS NOTES
[facility]  Physical Therapy Daily Treatment Note  Date: 2024  Patient Name: Awa Voss  : 1980   MRN: [id number]  DOInjury: 2024  DOSx: na   Referring Provider: Henrik Albarran DO  [address] [place] [address]     Medical Diagnosis:  s46.911a shoulder strain, s16.1xxa cerv strain     Outcome Measure:NDI= 34, 68%    S: Pt reports continued pain in cervical and right shoulder, but slight improvement. She has been trying to stretch more after we talked about need to prevent frozen shoulder.  O:  Added shoulder abd.  Time 2681-6302 3x/wk, 4-6 weeks   Visit - C9 extended thru 24   Pain 6/10    ROM Limited right shoulder flex and cerv SB L    Modalities     MH & ES to C/S and shoulders X 15 min ES: across neck into R shldr   THEREX     UBE L3 x 2 min FWD  L3 x 2 min Retro    Pulleys shoulder flex X 3 min     Shrugs     Shld flex 1# DB 2 x 10    Shldr abd 1# DB x 20    Overhead press 0# wand x 2 min     Pull downs Red x 20    ROWS: M R x 20    Low punch R 2 x 10 Right    ROWS: L R 2 x 10    ER Red 2 x 10 right    IR Red 2 x 10 right    Supine chest press wand     Supine shoulder flexion wand     Supine IR/ER wand               Functional activities To aid in ROM and strength needed for reaching , lifting ,pushing and pulling at home/work    Diag down      Diag up               A:  Tolerated well, demonstrates improved ROM, strength and functional status.   P: Continue with rehab plan.  Uma Viera PTA    Treatment Charges: TIMED Mins Time in Time out Units    17145 Initial Evaluation        37858 Ther Exercise         TE  24 0935 1001 2   45210 Manual Therapy     MT       33728 Ther Activities        TA       17800 Gait Training          GT       17448 Neuro Re-education NR       Non-Billable Service Time       Total Time/Timed Units 24 1035 0959 2         TREATMENT CHARGES : UNTIMED UNITS   34098 E- stim 1   74347 traction    12953 US    Total UNTIMED

## 2024-04-29 ENCOUNTER — HOSPITAL ENCOUNTER (OUTPATIENT)
Dept: PHYSICAL THERAPY | Age: 44
Setting detail: THERAPIES SERIES
Discharge: HOME | End: 2024-04-29
Payer: COMMERCIAL

## 2024-04-29 NOTE — PROGRESS NOTES
Physical Therapy - Cancellation    4/29/2024    MRN: [id number]  Awa MATHIEU Bipin      Patient called to cancel appointment. She has lost her keys.     Uma Viera, PTA

## 2024-05-01 ENCOUNTER — HOSPITAL ENCOUNTER (OUTPATIENT)
Dept: PHYSICAL THERAPY | Age: 44
Setting detail: THERAPIES SERIES
Discharge: HOME | End: 2024-05-01
Payer: COMMERCIAL

## 2024-05-01 PROCEDURE — 97110 THERAPEUTIC EXERCISES: CPT

## 2024-05-01 PROCEDURE — G0283 ELEC STIM OTHER THAN WOUND: HCPCS

## 2024-05-01 NOTE — PROGRESS NOTES
[facility]  Physical Therapy Daily Treatment Note  Date: 2024  Patient Name: Awa Voss  : 1980   MRN: [id number]  DOInjury: 2024  DOSx: na   Referring Provider: Henrik Albarran DO  [address] [place] [address]     Medical Diagnosis:  s46.911a shoulder strain, s16.1xxa cerv strain     Outcome Measure:NDI= 34, 68%    S: Pt reports continued pain in cervical and right shoulder 6/10, but slight improvement. She has been trying to stretch more and her ROM is improving.  O:  Added wand IR stretch  Time 2422-4377 3x/wk, 4-6 weeks   Visit - C9 extended thru 24   Pain /10    ROM Limited right shoulder flex and cerv SB L    Modalities     MH & ES to C/S and shoulders X 15 min ES: across neck into R shldr   THEREX     UBE L3 x 2 min FWD  L3 x 2 min Retro    Pulleys shoulder flex X 3 min     Shrugs     Shld flex 1# DB 2 x 10    Shldr abd 1# DB x 20    Overhead press 0# wand x 2 min    Wand IR stretch      Pull downs Red x 20    ROWS: M R x 20    Low punch R 2 x 10 Right    ROWS: L R 2 x 10    ER Red 2 x 10 right    IR Red 2 x 10 right    Supine chest press wand     Supine shoulder flexion wand     Supine IR/ER wand               Functional activities To aid in ROM and strength needed for reaching , lifting ,pushing and pulling at home/work    Diag down      Diag up               A:  Tolerated well, demonstrates improved ROM, strength and functional status.   P: Continue with rehab plan.  Uma Viera PTA    Treatment Charges: TIMED Mins Time in Time out Units    53945 Initial Evaluation        04241 Ther Exercise         TE  29 0950 1019 2   90814 Manual Therapy     MT       15548 Ther Activities        TA       26084 Gait Training          GT       90889 Neuro Re-education NR       Non-Billable Service Time       Total Time/Timed Units 29 0950 1019 2         TREATMENT CHARGES : UNTIMED UNITS   46509 E- stim 1   91674 traction    75396     Total UNTIMED Units

## 2024-05-03 ENCOUNTER — HOSPITAL ENCOUNTER (OUTPATIENT)
Dept: PHYSICAL THERAPY | Age: 44
Setting detail: THERAPIES SERIES
Discharge: HOME | End: 2024-05-03
Payer: COMMERCIAL

## 2024-05-03 PROCEDURE — 97530 THERAPEUTIC ACTIVITIES: CPT

## 2024-05-03 NOTE — PROGRESS NOTES
[facility]  Physical Therapy Daily Treatment Note  Date: 5/3/2024  Patient Name: Awa Voss  : 1980   MRN: [id number]  DOInjury: 2024  DOSx: na   Referring Provider: Henrik Albarran DO  [address] [place] [address]     Medical Diagnosis:  s46.911a shoulder strain, s16.1xxa cerv strain     Outcome Measure:NDI= 34, 68%    S: Pt reports continued pain in cervical and right shoulder 6/10, but slight improvement. She has been trying to stretch more and her ROM is improving.  O:  Patient arrived late, was delayed by line for blood work. RTD Occ Med @ 10:15 today; limited time for session. Exercises as time allowed, no modalities  Time 4961-5695 3x/wk, 4-6 weeks   Visit 10/12- C9 extended thru 24   Pain 6/10    ROM Limited right shoulder flex and cerv SB L    Modalities     MH & ES to C/S and shoulders ES: across neck into R shldr   THEREX     UBE    Pulleys shoulder flex X 3 min     Shrugs     Shld flex 1# DB 2 x 10    Shldr abd 1# DB x 20    Overhead press 0# wand x 2 min    Wand IR stretch      Pull downs Red x 20    ROWS: M    Low punch    ROWS: L    ER    IR    Supine chest press wand     Supine shoulder flexion wand     Supine IR/ER wand               Functional activities To aid in ROM and strength needed for reaching , lifting ,pushing and pulling at home/work    Diag down      Diag up               A:  Limited time for session.  P: Continue with rehab plan.  Uma Viera PTA    Treatment Charges: TIMED Mins Time in Time out Units    99873 Initial Evaluation        31503 Ther Exercise         TE  947 1000 2   92583 Manual Therapy     MT       12389 Ther Activities        TA       50500 Gait Training          GT       25588 Neuro Re-education NR       Non-Billable Service Time 947 1000        TREATMENT CHARGES : UNTIMED UNITS   38243 E- stim    63454 traction    37958 US    Total UNTIMED Units

## 2024-05-06 ENCOUNTER — HOSPITAL ENCOUNTER (OUTPATIENT)
Dept: PHYSICAL THERAPY | Age: 44
Setting detail: THERAPIES SERIES
Discharge: HOME | End: 2024-05-06
Payer: COMMERCIAL

## 2024-05-06 PROCEDURE — 97110 THERAPEUTIC EXERCISES: CPT

## 2024-05-06 NOTE — PROGRESS NOTES
[facility]  Physical Therapy Daily Treatment Note  Date: 2024  Patient Name: Awa Voss  : 1980   MRN: [id number]  DOInjury: 2024  DOSx: na   Referring Provider: Henrik Albarran DO  [address] [place] [address]     Medical Diagnosis:  s46.911a shoulder strain, s16.1xxa cerv strain     Outcome Measure:NDI= 34, 68%    S: Pt reports RTD Avis last Friday, continue with PT. Patient has Ortho consult with Dr. Styles tomorrow. Tentative RTW in , but depends on ortho evaluation.   O:  Patient deferred modalities, she had a time restraint due to another appointment.   Time 9359-0802 3x/wk, 4-6 weeks   Visit  C9 extended thru 24   Pain 6/10    ROM Limited right shoulder flex and cerv SB L    Modalities     MH & ES to C/S and shoulders ES: across neck into R shldr   THEREX     UBE L3 x 2 min FWD  L3 x 2 min Retro    Pulleys shoulder flex X 3 min     Shrugs     Shld flex 1# DB 2 x 10    Shldr abd 1# DB x 20    Overhead press 0# wand x 2 min    Wand IR stretch      Pull downs Red x 20    ROWS: M R x 20    Low punch R 2 x 10 Right    ROWS: L R 2 x 10    ER Red 2 x 10 right    IR Red 2 x 10 right    Supine chest press wand     Supine shoulder flexion wand     Supine IR/ER wand               Functional activities To aid in ROM and strength needed for reaching , lifting ,pushing and pulling at home/work    Diag down      Diag up               A:  Patient tolerated well.  P: Continue with rehab plan.  Uma Viera PTA    Treatment Charges: TIMED Mins Time in Time out Units    62148 Initial Evaluation        77701 Ther Exercise         TE  23 0955 1016 2   71233 Manual Therapy     MT       55045 Ther Activities        TA       82423 Gait Training          GT       92299 Neuro Re-education NR       Non-Billable Service Time 23 0955 1016 2       TREATMENT CHARGES : UNTIMED UNITS   81672 E- stim    58261 traction    98398 US    Total UNTIMED Units

## 2024-05-07 ENCOUNTER — OFFICE VISIT (OUTPATIENT)
Dept: ORTHOPEDIC SURGERY | Age: 44
End: 2024-05-07
Payer: COMMERCIAL

## 2024-05-07 VITALS — WEIGHT: 225 LBS | BODY MASS INDEX: 38.41 KG/M2 | TEMPERATURE: 98 F | HEIGHT: 64 IN

## 2024-05-07 DIAGNOSIS — M75.21 TENDONITIS OF LONG HEAD OF BICEPS BRACHII OF RIGHT SHOULDER: ICD-10-CM

## 2024-05-07 DIAGNOSIS — M25.811 IMPINGEMENT OF RIGHT SHOULDER: Primary | ICD-10-CM

## 2024-05-07 PROCEDURE — 99203 OFFICE O/P NEW LOW 30 MIN: CPT | Performed by: ORTHOPAEDIC SURGERY

## 2024-05-07 NOTE — PROGRESS NOTES
Sexual activity: Yes     Partners: Male   Other Topics Concern    Not on file   Social History Narrative    Not on file     Social Determinants of Health     Financial Resource Strain: Not on file   Food Insecurity: Not on file   Transportation Needs: Not on file   Physical Activity: Not on file   Stress: Not on file   Social Connections: Not on file   Intimate Partner Violence: Not on file   Housing Stability: Not on file     Family History   Problem Relation Age of Onset    Hearing Loss Paternal Cousin     Heart Disease Mother        REVIEW OF SYSTEMS:     General/Constitution:  (-)weight loss, (-)fever, (-)chills, (-)weakness.   Skin: (-) rash,(-) psoriasis,(-) eczema, (-)skin cancer.   Musculoskeletal: (-) fractures,  (-) dislocations,(-) collagen vascular disease, (-) fibromyalgia, (-) multiple sclerosis, (-) muscular dystrophy, (-) RSD,(-) joint pain (-)swelling, (-) joint pain,swelling.  Neurologic: (-) epilepsy, (-)seizures,(-) brain tumor,(-) TIA, (-)stroke, (-)headaches, (-)Parkinson disease,(-) memory loss, (-) LOC.  Cardiovascular: (-) Chest pain, (-) swelling in legs/feet, (-) SOB, (-) cramping in legs/feet with walking.  Respiratory: (-) SOB, (-) Coughing, (-) night sweats.  GI: (-) nausea, (-) vomiting, (-) diarrhea, (-) blood in stool, (-) gastric ulcer.  Psychiatric: (-) Depression, (-) Anxiety, (-) bipolar disease, (-) Alzheimer's Disease  Allergic/Immunologic: (-) allergies latex, (-) allergies metal, (-) skin sensitivity.  Hematlogic: (-) anemia, (-) blood transfusion, (-) DVT/PE, (-) Clotting disorders      Subjective:    Constitution:  Temp 98 °F (36.7 °C)   Ht 1.626 m (5' 4\")   Wt 102.1 kg (225 lb)   BMI 38.62 kg/m²     Psycihatric:  The patient is alert and oriented x 3, appears to be stated age and in no distress.      Respiratory:  Respiratory effort is not labored.  Patient is not gasping.  Palpation of the chest reveals no tactile fremitus.    Skin:  Upon inspection: the skin

## 2024-05-08 ENCOUNTER — HOSPITAL ENCOUNTER (OUTPATIENT)
Dept: PHYSICAL THERAPY | Age: 44
Setting detail: THERAPIES SERIES
Discharge: HOME | End: 2024-05-08
Payer: COMMERCIAL

## 2024-05-08 PROCEDURE — 97110 THERAPEUTIC EXERCISES: CPT | Performed by: PHYSICAL THERAPIST

## 2024-05-08 NOTE — PROGRESS NOTES
[facility]  Physical Therapy Daily Treatment Note  Date: 2024  Patient Name: Awa Voss  : 1980   MRN: [id number]  DOInjury: 2024  DOSx: na   Referring Provider: Henrik Albarran DO  [address] [place] [address]     Medical Diagnosis:  s46.911a shoulder strain, s16.1xxa cerv strain     Outcome Measure:NDI= 34, 68%    S:  Patient had Ortho consult with Dr. Styles.  He is requesting an injection and additional PT.   O:  Patient deferred modalities , feels she does not want to get used to them.   Time 2492-5820 3x/wk, 4-6 weeks   Visit  C9 extended thru 24   Pain /10    ROM Limited right shoulder flex and cerv SB L    Modalities     MH & ES to C/S and shoulders ES: across neck into R shldr   THEREX     UBE L3 x 2 min FWD  L3 x 2 min Retro    Pulleys shoulder flex X 3 min     Shrugs     Shld flex 1# DB 2 x 10    Shldr abd 1# DB x 20    Overhead press 0# wand x 2 min    Wand IR stretch      Pull downs Red x 20    ROWS: M R x 20    Low punch R 2 x 10 Right    ROWS: L R 2 x 10    ER Red 2 x 10 right    IR Red 2 x 10 right    Supine chest press wand     Supine shoulder flexion wand     Supine IR/ER wand               Functional activities To aid in ROM and strength needed for reaching , lifting ,pushing and pulling at home/work    Diag down      Diag up               A:  Patient tolerated well. AROM is improving  P: Continue with rehab plan.  Henrik Chavira PT    Treatment Charges: TIMED Mins Time in Time out Units    63988 Initial Evaluation        84071 Ther Exercise         TE  43 1012 2   97187 Manual Therapy     MT       34536 Ther Activities        TA       02586 Gait Training          GT       67002 Neuro Re-education NR       Non-Billable Service Time  0943 1012 2       TREATMENT CHARGES : UNTIMED UNITS   50930 E- stim    33437 traction    31256     Total UNTIMED Units

## 2024-05-10 ENCOUNTER — HOSPITAL ENCOUNTER (OUTPATIENT)
Age: 44
Discharge: HOME | End: 2024-05-10
Payer: COMMERCIAL

## 2024-05-10 ENCOUNTER — HOSPITAL ENCOUNTER (OUTPATIENT)
Dept: PHYSICAL THERAPY | Age: 44
Setting detail: THERAPIES SERIES
Discharge: HOME | End: 2024-05-10
Payer: COMMERCIAL

## 2024-05-10 LAB
BASOPHILS # BLD: 0.03 K/UL (ref 0–0.2)
BASOPHILS NFR BLD: 1 % (ref 0–2)
EOSINOPHIL # BLD: 0.09 K/UL (ref 0.05–0.5)
EOSINOPHILS RELATIVE PERCENT: 2 % (ref 0–6)
ERYTHROCYTE [DISTWIDTH] IN BLOOD BY AUTOMATED COUNT: 20.1 % (ref 12–16)
HCT VFR BLD AUTO: 25.8 % (ref 34–48)
HGB BLD-MCNC: 7.6 G/DL (ref 11.5–15.5)
IMM GRANULOCYTES # BLD AUTO: <0.03 K/UL (ref 0–0.58)
IMM GRANULOCYTES NFR BLD: 0 % (ref 0–5)
LYMPHOCYTES NFR BLD: 1.47 K/UL (ref 1.5–4)
LYMPHOCYTES RELATIVE PERCENT: 38 % (ref 20–42)
MCH RBC QN AUTO: 20.3 PG (ref 26–35)
MCHC RBC AUTO-ENTMCNC: 29.5 G/DL (ref 32–34.5)
MCV RBC AUTO: 68.8 FL (ref 80–99.9)
MONOCYTES NFR BLD: 0.42 K/UL (ref 0.1–0.95)
MONOCYTES NFR BLD: 11 % (ref 2–12)
NEUTROPHILS NFR BLD: 47 % (ref 43–80)
NEUTS SEG NFR BLD: 1.81 K/UL (ref 1.8–7.3)
PLATELET # BLD AUTO: 391 K/UL (ref 130–450)
PMV BLD AUTO: 9.7 FL (ref 7–12)
RBC # BLD AUTO: 3.75 M/UL (ref 3.5–5.5)
RBC # BLD: ABNORMAL 10*6/UL
WBC OTHER # BLD: 3.8 K/UL (ref 4.5–11.5)

## 2024-05-10 PROCEDURE — 36415 COLL VENOUS BLD VENIPUNCTURE: CPT

## 2024-05-10 PROCEDURE — 97110 THERAPEUTIC EXERCISES: CPT

## 2024-05-10 PROCEDURE — 85025 COMPLETE CBC W/AUTO DIFF WBC: CPT

## 2024-05-10 NOTE — PROGRESS NOTES
[facility]  Physical Therapy Daily Treatment Note  Date: 5/10/2024  Patient Name: Awa Voss  : 1980   MRN: [id number]  DOInjury: 2024  DOSx: na   Referring Provider: Henrik Albarran DO  [address] [place] [address]     Medical Diagnosis:  s46.911a shoulder strain, s16.1xxa cerv strain     Outcome Measure:NDI= 34, 68%    S:  Patient had Ortho consult with Dr. Styles.  He is requesting an injection and additional PT.   O:  Patient deferred modalities, feels she does not want to get used to them.   Time 9417-4180 3x/wk, 4-6 weeks   Visit  C9 extended thru 24   Pain /10    ROM Limited right shoulder flex and cerv SB L    Modalities     MH & ES to C/S and shoulders ES: across neck into R shldr   THEREX     UBE L3 x 2 min FWD  L3 x 2 min Retro    Pulleys shoulder flex X 3 min     Shrugs     Shld flex 1# DB 2 x 10    Shldr abd 1# DB x 20    Overhead press 1# wand x 20    Wand IR stretch      Pull downs Red x 20    ROWS: M R x 20    Low punch R 2 x 10 Right    ROWS: L R 2 x 10    ER Red 2 x 10 right    IR Red 2 x 10 right    Supine chest press wand     Supine shoulder flexion wand     Supine IR/ER wand               Functional activities To aid in ROM and strength needed for reaching , lifting ,pushing and pulling at home/work    Diag down      Diag up               A:  Patient tolerated well. AROM is improving  P: Continue with rehab plan.  Uma Viera PTA    Treatment Charges: TIMED Mins Time in Time out Units    17226 Initial Evaluation        05282 Ther Exercise         TE  23 0940 1003 2   11480 Manual Therapy     MT       91127 Ther Activities        TA       05275 Gait Training          GT       17081 Neuro Re-education NR 1 1003 1004    Non-Billable Service Time 24 0940 1004 2       TREATMENT CHARGES : UNTIMED UNITS   40179 E- stim    66306 traction    16772 US    Total UNTIMED Units

## 2024-05-13 ENCOUNTER — HOSPITAL ENCOUNTER (OUTPATIENT)
Dept: PHYSICAL THERAPY | Age: 44
Setting detail: THERAPIES SERIES
Discharge: HOME | End: 2024-05-13
Payer: COMMERCIAL

## 2024-05-13 NOTE — PROGRESS NOTES
Physical Therapy - Cancellation    5/13/2024    MRN: [id number]  Awa Voss      Patient called to cancel appointment.    Uma Viera, PTA

## 2024-05-15 ENCOUNTER — HOSPITAL ENCOUNTER (OUTPATIENT)
Dept: PHYSICAL THERAPY | Age: 44
Setting detail: THERAPIES SERIES
Discharge: HOME | End: 2024-05-15
Payer: COMMERCIAL

## 2024-05-15 PROCEDURE — 97110 THERAPEUTIC EXERCISES: CPT

## 2024-05-15 NOTE — PROGRESS NOTES
[facility]  Physical Therapy Daily Treatment Note  Date: 5/15/2024  Patient Name: Awa Voss  : 1980   MRN: [id number]  DOInjury: 2024  DOSx: na   Referring Provider: Henrik Albarran DO  [address] [place] [address]     Medical Diagnosis:  s46.911a shoulder strain, s16.1xxa cerv strain     Outcome Measure:NDI= 34, 68%    S:  Patient had Ortho consult with Dr. Styles.  He is requesting an injection and additional PT. Injection and continuation of therapy still waiting approval. Patient continues with pain with shoulder elevation greater than 67 degrees. She is able to perform functional elevation assisted.Some improvement with cervical SB left to 8 degrees, still painful.   O:  Patient deferred modalities, feels she does not want to get used to them.   Time 8495-6705 3x/wk, 4-6 weeks   Visit  C9 extended thru 24   Pain 7/10    ROM Limited right shoulder flex and cerv SB L    Modalities     MH & ES to C/S and shoulders ES: across neck into R shldr   THEREX     UBE L3 x 2 min FWD  L3 x 2 min Retro    Pulleys shoulder flex X 3 min     Shrugs     Shld flex 1# DB 2 x 10    Shldr abd 1# DB x 20    Overhead press 1# wand x 20    Wand IR stretch      Pull downs Red x 20    ROWS: M R x 20    Punch regular  R 2 x 10 Right    ROWS: L R 2 x 10    ER Red 2 x 10 right    IR Red 2 x 10 right    Supine chest press wand     Supine shoulder flexion wand     Supine IR/ER wand               Functional activities To aid in ROM and strength needed for reaching , lifting ,pushing and pulling at home/work    Diag down      Diag up               A:  Patient tolerated well. Patient still has painful right shoulder elevation and left cervical SB.  P: Continue with rehab plan.  Uma Viera PTA    Treatment Charges: TIMED Mins Time in Time out Units    54943 Initial Evaluation        20003 Ther Exercise         TE  25 0938 0903 2   34658 Manual Therapy     MT       62930 Ther

## 2024-05-20 ENCOUNTER — HOSPITAL ENCOUNTER (OUTPATIENT)
Dept: PHYSICAL THERAPY | Age: 44
Setting detail: THERAPIES SERIES
Discharge: HOME | End: 2024-05-20
Payer: COMMERCIAL

## 2024-05-20 PROCEDURE — 97110 THERAPEUTIC EXERCISES: CPT

## 2024-05-22 ENCOUNTER — HOSPITAL ENCOUNTER (OUTPATIENT)
Dept: PHYSICAL THERAPY | Age: 44
Setting detail: THERAPIES SERIES
Discharge: HOME | End: 2024-05-22
Payer: COMMERCIAL

## 2024-05-22 PROCEDURE — 97110 THERAPEUTIC EXERCISES: CPT

## 2024-05-22 NOTE — PROGRESS NOTES
[facility]  Physical Therapy Daily Treatment Note  Date: 2024  Patient Name: Awa Voss  : 1980   MRN: [id number]  DOInjury: 2024  DOSx: na   Referring Provider: Henrik Albarran DO  [address] [place] [address]     Medical Diagnosis:  s46.911a shoulder strain, s16.1xxa cerv strain     Outcome Measure:NDI= 34, 68%    S:  Injection and continuation of therapy still waiting approval. Patient continues with pain especially with right shoulder elevation, left cervical SB.  O:  Patient deferred modalities.   Time 0822-2569 3x/wk, 4-6 weeks   Visit  C9 extended thru 24   Pain 7/10    ROM Limited right shoulder flex and cerv SB L    Modalities     MH & ES to C/S and shoulders ES: across neck into R shldr   THEREX     UBE L3 x 2 min FWD  L3 x 2 min Retro    Pulleys shoulder flex X 3 min     Shrugs     Shld flex 1# DB 2 x 10    Shldr abd 1# DB x 20    Overhead press 1# wand x 20    Wand IR stretch      Pull downs Red x 20    ROWS: M R x 20    Punch regular  R x 20 Right    ROWS: L R x 20    ER Red 2 x 10 right    IR Red 2 x 10 right    Supine chest press wand     Supine shoulder flexion wand     Supine IR/ER wand               Functional activities To aid in ROM and strength needed for reaching , lifting ,pushing and pulling at home/work    Diag down Red x 20 each     Diag up               A:  Patient tolerated well. Added diagonal downs for functional movement and strengthening. Patient still has painful right shoulder elevation and left cervical SB.  P: Continue with rehab plan.  Uma Viera PTA    Treatment Charges: TIMED Mins Time in Time out Units    94838 Initial Evaluation        57219 Ther Exercise         TE   0937 1000 2   96211 Manual Therapy     MT       95087 Ther Activities        TA       19912 Gait Training          GT       77866 Neuro Re-education NR 1 1000 1001 0   Non-Billable Service Time 24 0937 1001 2       TREATMENT CHARGES : UNTIMED

## 2024-05-23 ENCOUNTER — HOSPITAL ENCOUNTER (OUTPATIENT)
Dept: PHYSICAL THERAPY | Age: 44
Setting detail: THERAPIES SERIES
End: 2024-05-23
Payer: COMMERCIAL

## 2024-05-30 ENCOUNTER — TELEPHONE (OUTPATIENT)
Dept: ORTHOPEDIC SURGERY | Age: 44
End: 2024-05-30

## 2024-05-30 NOTE — TELEPHONE ENCOUNTER
Patient is asking to speak with someone about getting an excuse for work as her injection is not scheduled until 6/25. Please contact patient to discuss.

## 2024-05-31 ENCOUNTER — HOSPITAL ENCOUNTER (OUTPATIENT)
Dept: PHYSICAL THERAPY | Age: 44
Setting detail: THERAPIES SERIES
Discharge: HOME | End: 2024-05-31
Payer: COMMERCIAL

## 2024-05-31 PROCEDURE — 97110 THERAPEUTIC EXERCISES: CPT

## 2024-05-31 NOTE — PROGRESS NOTES
[facility]  Physical Therapy Daily Treatment Note  Date: 2024  Patient Name: Awa Voss  : 1980   MRN: [id number]  DOInjury: 2024  DOSx: na   Referring Provider: Henrik Albarran DO  [address] [place] [address]     Medical Diagnosis:  s46.911a shoulder strain, s16.1xxa cerv strain     Outcome Measure:NDI= 34, 68%    S: Continuation of therapy approved, patient scheduled for injection 24 but is on waiting list to try to get sooner. Patient continues with pain especially with right shoulder elevation, left cervical SB.  O:  Patient deferred modalities.   Time 0917- 3x/wk, 4-6 weeksC9  thru 24   +12 thru 24   Visit 17/ C9  ended  0/12    Pain 7/10    ROM Limited right shoulder flex and cerv SB L    Modalities     MH & ES to C/S and shoulders ES: across neck into R shldr   THEREX     UBE L3 x 2 min FWD  L3 x 2 min Retro    Pulleys shoulder flex X 3 min     Shrugs     Shld flex 1# DB x 20    Shldr abd 1# DB x 20    Overhead press 1# DB x 20    Wand IR stretch      Pull downs Red x 20    ROWS: M R x 20    Punch regular  R x 20 Right    ROWS: L R x 20    ER Red 2 x 10 right    IR Red 2 x 10 right    Supine chest press wand     Supine shoulder flexion wand     Supine IR/ER wand               Functional activities To aid in ROM and strength needed for reaching , lifting ,pushing and pulling at home/work    Diag down 2Red x 20 each     Diag up 1Red x 20 each              A:  Patient tolerated well. Added diagonal up for functional movement and strengthening. Patient still has painful right shoulder elevation and left cervical SB.  P: Continue with rehab plan.  Uma Viera PTA    Treatment Charges: TIMED Mins Time in Time out Units    16486 Initial Evaluation        72797 Ther Exercise         TE  20 0917 0937 2   33716 Manual Therapy     MT       29473 Ther Activities        TA 4 0937 0941 0   05063 Gait Training          GT       17751 Neuro

## 2024-06-03 ENCOUNTER — HOSPITAL ENCOUNTER (OUTPATIENT)
Dept: PHYSICAL THERAPY | Age: 44
Setting detail: THERAPIES SERIES
Discharge: HOME | End: 2024-06-03
Payer: COMMERCIAL

## 2024-06-03 NOTE — PROGRESS NOTES
Physical Therapy Progress Note    Date: 6/3/2024  Patient Name: Awa Voss  : 1980   MRN: [id number]    Pt called to cancel PT appt  today, rescheduled for tomorrow.     Henrik Chavira PT

## 2024-06-04 ENCOUNTER — HOSPITAL ENCOUNTER (OUTPATIENT)
Dept: PHYSICAL THERAPY | Age: 44
Setting detail: THERAPIES SERIES
Discharge: HOME | End: 2024-06-04
Payer: COMMERCIAL

## 2024-06-04 PROCEDURE — 97110 THERAPEUTIC EXERCISES: CPT

## 2024-06-04 NOTE — PROGRESS NOTES
[facility]  Physical Therapy Daily Treatment Note  Date: 2024  Patient Name: Awa Voss  : 1980   MRN: [id number]  DOInjury: 2024  DOSx: na   Referring Provider: Henrik Albarran DO  [address] [place] [address]     Medical Diagnosis:  s46.911a shoulder strain, s16.1xxa cerv strain     Outcome Measure:NDI= 34, 68%    S: Continuation of therapy approved, patient scheduled for injection 24 but is on waiting list to try to get sooner. Patient still having most difficulty reaching behind back and behind head.  O:  Patient progressed to green tubing, resumed wand IR stretch, added supine ER stretch.  Time 4370-8559 3x/wk, 4-6 weeks C9  thru 24   +12 thru 24   Visit 17/ C9  ended   thru 24    Pain 7/10    ROM Limited right shoulder flex and cerv SB L    Modalities     MH & ES to C/S and shoulders ES: across neck into R shldr   THEREX     UBE L3 x 2 min FWD  L3 x 2 min Retro    Pulleys shoulder flex X 3 min     Shrugs     Shld flex 1# DB x 20    Shldr abd 1# DB x 20    Overhead press 1# DB x 20    Wand IR stretch X 2 min    Supine ER stretch hands behind head 6 x 10     Pull downs Green x 20    ROWS: M Green x 20    Punch regular  Green x 20 Right    ROWS: L Green x 20    ER Green 2 x 10 right    IR Green 2 x 10 right    Functional activities To aid in ROM and strength needed for reaching, lifting, pushing and pulling at home/work    Diag down Green x 20 each     Diag up Green x 20 each              A:  Patient tolerated well. Patient progressing with exercises.  P: Continue with rehab plan.  Uma Viera PTA    Treatment Charges: TIMED Mins Time in Time out Units    06296 Initial Evaluation        58557 Ther Exercise         TE  22 1004 1026 2   05414 Manual Therapy     MT       70569 Ther Activities        TA 4 1026 1030 0   44865 Gait Training          GT       46678 Neuro Re-education NR       Non-Billable Service Time       Total

## 2024-06-05 ENCOUNTER — HOSPITAL ENCOUNTER (OUTPATIENT)
Dept: PHYSICAL THERAPY | Age: 44
Setting detail: THERAPIES SERIES
Discharge: HOME | End: 2024-06-05
Payer: COMMERCIAL

## 2024-06-05 PROCEDURE — 97110 THERAPEUTIC EXERCISES: CPT

## 2024-06-05 NOTE — PROGRESS NOTES
[facility]  Physical Therapy Daily Treatment Note  Date: 2024  Patient Name: Awa Voss  : 1980   MRN: [id number]  DOInjury: 2024  DOSx: na   Referring Provider: Henrik Albarran DO  [address] [place] [address]     Medical Diagnosis:  s46.911a shoulder strain, s16.1xxa cerv strain     Outcome Measure:NDI= 34, 68%    S: Patient reports tolerating last session well.   O:  Patient arrived 20 minutes late.  Time 4966-8225 3x/wk, 4-6 weeks C9  thru 24   +12 thru 24   Visit 17/ C9  ended   thru 24    Pain 710    ROM Limited right shoulder flex and cerv SB L    Modalities     MH & ES to C/S and shoulders ES: across neck into R shldr   THEREX     UBE L3 x 2 min FWD  L3 x 2 min Retro    Pulleys shoulder flex X 3 min     Shrugs     Shld flex    Shldr abd    Overhead press    Wand IR stretch    Supine ER stretch hands behind head     Pull downs Green x 20    ROWS: M Green x 20    Punch regular  Green x 20 Right    ROWS: L Green x 20    ER Green 2 x 10 right    IR Green 2 x 10 right    Functional activities To aid in ROM and strength needed for reaching, lifting, pushing and pulling at home/work    Diag down Green x 20 each     Diag up              A:  Patient had limited time, resume all activities NV.  P: Continue with rehab plan.  Uma Viera PTA    Treatment Charges: TIMED Mins Time in Time out Units    15455 Initial Evaluation        09088 Ther Exercise         TE  10 0950 1000 1   94940 Manual Therapy     MT       43445 Ther Activities        TA       03339 Gait Training          GT       52202 Neuro Re-education NR       Non-Billable Service Time 8 1000 1008 0   Total Time/Timed Units 18 0950 1008 1       TREATMENT CHARGES : UNTIMED UNITS   21027 E- stim    32970 traction    54317     Total UNTIMED Units

## 2024-06-12 ENCOUNTER — HOSPITAL ENCOUNTER (OUTPATIENT)
Dept: PHYSICAL THERAPY | Age: 44
Setting detail: THERAPIES SERIES
Discharge: HOME | End: 2024-06-12
Payer: COMMERCIAL

## 2024-06-12 PROCEDURE — 97110 THERAPEUTIC EXERCISES: CPT

## 2024-06-12 NOTE — PROGRESS NOTES
[facility]  Physical Therapy Daily Treatment Note  Date: 2024  Patient Name: Awa Voss  : 1980   MRN: [id number]  DOInjury: 2024  DOSx: na   Referring Provider: Henrik Albarran DO  [address] [place] [address]     Medical Diagnosis:  s46.911a shoulder strain, s16.1xxa cerv strain     Outcome Measure:NDI= 34, 68%    S: Patient reports constant pain 6/10 right shoulder. Injection scheduled for 24.  O:    Time 5954-6483 3x/wk, 4-6 weeks C9  thru 24   +12 thru 24   Visit 17/ C9  ended  3/12 thru 24    Pain 6/10    ROM Limited right shoulder flex and cerv SB L    Modalities     MH & ES to C/S and shoulders ES: across neck into R shldr   THEREX     UBE L4 x 2 min FWD  L4 x 2 min Retro    Pulleys shoulder flex X 3 min     Shrugs     Shld flex 1# DB x 20    Shldr abd 1# DB x 20    Overhead press 1# DB x 20    Wand IR stretch X 2 min    Supine ER stretch hands behind head 6 x 10 sec     Pull downs Green x 20    ROWS: M Green x 20    Punch regular  Green x 20 Right    ROWS: L Green x 20    ER Green 2 x 10 right    IR Green 2 x 10 right    Functional activities To aid in ROM and strength needed for reaching, lifting, pushing and pulling at home/work    Diag down Green x 20 each     Diag up Red x 20 D2  Green x 20 D1              A:  Patient tolerated fair.   P: Continue with rehab plan.  Uma Viera PTA    Treatment Charges: TIMED Mins Time in Time out Units    15062 Initial Evaluation        75887 Ther Exercise         TE  25 0945 1010 2   11777 Manual Therapy     MT       29992 Ther Activities        TA 4 1010 1014 0   04073 Gait Training          GT       66384 Neuro Re-education NR       Non-Billable Service Time    0   Total Time/Timed Units 29 0945 1014 2       TREATMENT CHARGES : UNTIMED UNITS   68460 E- stim    24847 traction    35363     Total UNTIMED Units

## 2024-06-19 ENCOUNTER — HOSPITAL ENCOUNTER (OUTPATIENT)
Dept: PHYSICAL THERAPY | Age: 44
Setting detail: THERAPIES SERIES
Discharge: HOME | End: 2024-06-19
Payer: COMMERCIAL

## 2024-06-19 PROCEDURE — 97110 THERAPEUTIC EXERCISES: CPT | Performed by: PHYSICAL THERAPIST

## 2024-06-20 NOTE — PROGRESS NOTES
PT PROGRESS REPORT      PATIENT: Awa Voss   Date: 6/20/2024  DIAGNOSIS:    s46.911a shoulder strain, s16.1xxa cerv strain  PHYSICIAN:  Henrik Albarran,   [address] [place] [address]     ATTENDANCE:  22  OUT OF 31  APPOINTMENT FROM 4/4/124 TO 6/21/2024  TREATMENTS RECEIVED:   THEREX,  ES , MH , , HEP,    ROM  INITIAL PROBLEM CURRENT STATUS       PAIN 0-10/10   constant  0-6/10 intermittent         DECREASED ROM  C/S  cerv   Flex=           15 Deg   Ext=             22 Deg   SB left=       0 Deg   SB right=     24 Deg   Rot left=      24 Deg   Rot right=    45 Deg     cerv   Flex=          34 Deg   Ext=             22 Deg   SB left=       12 Deg   SB right=     25 Deg   Rot left=      45 Deg   Rot right=    46 Deg    DECREASED ROM RUE  RIGHT    Shoulder    AROM                                                    Flex=  97 Deg                                              ir=  to L5 spinal level                AROM                                                    Flex=  158 Deg                                              ir=  to L2 spinal level   DECREASED STRENGTH       cerv   Flex=           3+/5              Ext=            3+/5         SB left=       3/5     SB right=     3+/5     RIGHT    Shoulder       flex= 3/5    abd =   3/5        Ir =   3+/5                  Er = 3/5       cerv   Flex=           4/5              Ext=            4/5         SB left=       4-/5     SB right=     4/5     RIGHT    Shoulder       flex= 4-/5    abd =   4-/5        Ir =   4/5                  Er = 4-/5              COMMENTS/ RECOMMENDATIONS: Pt progressing with increased ROM and strength. Pt would benefit from additional PT for safe return to work after injection in right shoulder.     POC: Continue PT 2x/wk for 4 additiional weeks:  Therex, ROM,   PRE,  HEP.    GOALS: 1. Decreased pain to 0-3/10                 2. Increased AROM  RUE and C/S to WNL to improve ease of  overhead tasks and head turns                 3.

## 2024-06-21 ENCOUNTER — HOSPITAL ENCOUNTER (OUTPATIENT)
Dept: PHYSICAL THERAPY | Age: 44
Setting detail: THERAPIES SERIES
Discharge: HOME | End: 2024-06-21
Payer: COMMERCIAL

## 2024-06-21 PROCEDURE — 97110 THERAPEUTIC EXERCISES: CPT | Performed by: PHYSICAL THERAPIST

## 2024-06-21 NOTE — PROGRESS NOTES
[facility]  Physical Therapy Daily Treatment Note  Date: 2024  Patient Name: Awa Voss  : 1980   MRN: [id number]  DOInjury: 2024  DOSx: na   Referring Provider: Henrik Albarran DO  [address] [place] [address]     Medical Diagnosis:  s46.911a shoulder strain, s16.1xxa cerv strain     Outcome Measure:NDI= 34, 68%    S: Patient reports   pain 6/10 right shoulder. Pain is described as manageable.  Injection scheduled for 24.  O:    Time 4746-3406 3x/wk, 4-6 weeks C9  thru 24   +12 thru 24   Visit 17/ C9  ended   thru 24    Pain 6/10    ROM Limited right shoulder flex and cerv SB L    Modalities     MH & ES to C/S and shoulders ES: across neck into R shldr   THEREX     UBE L4 x 2 min FWD  L4 x 2 min Retro    Pulleys shoulder flex X 3 min     Shrugs     Shld flex 1# DB x 20    Shldr abd 1# DB x 20    Overhead press 1# DB x 20    Wand IR stretch X 2 min    Supine ER stretch hands behind head 6 x 10 sec     Pull downs Green x 20    ROWS: M Green x 20    Punch regular  Green x 20 Right    ROWS: L Green x 20    ER Green 2 x 10 right    IR Green 2 x 10 right    Functional activities To aid in ROM and strength needed for reaching, lifting, pushing and pulling at home/work    Diag down Green x 20 each     Diag up Red x 20 D2  Green x 20 D1              A:  Patient tolerated well. Some difficulty with overhead press. See reeval  P: Continue with rehab plan.  Henrik Chavira, PT    Treatment Charges: TIMED Mins Time in Time out Units    67365 Initial Evaluation        15279 Ther Exercise         TE  905 0926 2   84344 Manual Therapy     MT       57332 Ther Activities         0930 0   92838 Gait Training          GT       91629 Neuro Re-education NR       Non-Billable Service Time    0   Total Time/Timed Units 25 0905 0930 2       TREATMENT CHARGES : UNTIMED UNITS   83053 E- stim    98338 traction    19061     Total UNTIMED Units

## 2024-06-24 ENCOUNTER — HOSPITAL ENCOUNTER (OUTPATIENT)
Dept: PHYSICAL THERAPY | Age: 44
Setting detail: THERAPIES SERIES
Discharge: HOME | End: 2024-06-24
Payer: COMMERCIAL

## 2024-06-24 PROCEDURE — 97110 THERAPEUTIC EXERCISES: CPT

## 2024-06-24 NOTE — PROGRESS NOTES
[facility]  Physical Therapy Daily Treatment Note  Date: 2024  Patient Name: Awa Voss  : 1980   MRN: [id number]  DOInjury: 2024  DOSx: na   Referring Provider: Henrik Albarran DO  [address] [place] [address]     Medical Diagnosis:  s46.911a shoulder strain, s16.1xxa cerv strain     Outcome Measure:NDI= 34, 68%    S: Patient reports pain 6/10 right shoulder. Pain is described as manageable.  Injection scheduled for 24. She wants to get back to work, financially hard being off.  O:    Time 3999-0118 3x/wk, 4-6 weeks   C9  thru 24   +12 thru 24   Visit 17/ C9  ended   thru 24    Pain 6/10    ROM Limited right shoulder flex and cerv SB L    Modalities     MH & ES to C/S and shoulders ES: across neck into R shldr   THEREX     UBE L4 x 2 min FWD  L4 x 2 min Retro    Pulleys shoulder flex X 3 min     Shrugs     Shld flex 1# DB x 20    Shldr abd 1# DB x 20    Overhead press 1# DB x 20    Wand IR stretch X 2 min    Supine ER stretch hands behind head 6 x 10 sec     Pull downs Green x 20    ROWS: M Green x 20    Punch regular  Green x 20 Right    ROWS: L Green x 20    ER Green 2 x 10 right    IR Green 2 x 10 right    Functional activities To aid in ROM and strength needed for reaching, lifting, pushing and pulling at home/work    Diag down Green x 20 each     Diag up Red x 20 D2  Green x 20 D1              A:  Patient tolerated well. Some difficulty with overhead press.   P: Continue with rehab plan.  Uma Viera PTA    Treatment Charges: TIMED Mins Time in Time out Units    99415 Initial Evaluation        53430 Ther Exercise         TE  23 0935 0958 2   00208 Manual Therapy     MT       17074 Ther Activities        TA 4 0958 1002 0   85060 Gait Training          GT       63317 Neuro Re-education NR       Non-Billable Service Time       Total Time/Timed Units 27 0935 1002 2       TREATMENT CHARGES : UNTIMED UNITS   29121 E- stim    60750

## 2024-06-25 ENCOUNTER — OFFICE VISIT (OUTPATIENT)
Dept: ORTHOPEDIC SURGERY | Age: 44
End: 2024-06-25

## 2024-06-25 VITALS — BODY MASS INDEX: 38.41 KG/M2 | TEMPERATURE: 98 F | HEIGHT: 64 IN | WEIGHT: 225 LBS

## 2024-06-25 DIAGNOSIS — M25.811 IMPINGEMENT OF RIGHT SHOULDER: Primary | ICD-10-CM

## 2024-06-25 DIAGNOSIS — M75.21 TENDONITIS OF LONG HEAD OF BICEPS BRACHII OF RIGHT SHOULDER: ICD-10-CM

## 2024-06-25 RX ORDER — TRIAMCINOLONE ACETONIDE 40 MG/ML
40 INJECTION, SUSPENSION INTRA-ARTICULAR; INTRAMUSCULAR ONCE
Status: COMPLETED | OUTPATIENT
Start: 2024-06-25 | End: 2024-06-25

## 2024-06-25 RX ADMIN — TRIAMCINOLONE ACETONIDE 40 MG: 40 INJECTION, SUSPENSION INTRA-ARTICULAR; INTRAMUSCULAR at 11:20

## 2024-06-25 RX ADMIN — TRIAMCINOLONE ACETONIDE 40 MG: 40 INJECTION, SUSPENSION INTRA-ARTICULAR; INTRAMUSCULAR at 11:21

## 2024-06-25 NOTE — PROGRESS NOTES
Chief Complaint   Patient presents with    Shoulder Pain     WC Right shoulder subacromial cortisone injection, Right bicep tendon cortisone injection     The patient is here today for W/C approve cortisone injections.     I will proceed with a cortisone injection in the Right shoulder. Verbal and written consent was obtained for the injection.  Skin was prepped with alcohol, 1 ml of Kenalog 40 mg and 9 ml of 0.25% Marcaine was injected to the posterior shoulder through the subacromial space of the Right Shoulder. The patient tolerated the injections well. I will see the patient back 6 weeks.        Verbal and written consent was obtained for the injection. I will proceed with a cortisone injection in the Right shoulder using ultrasound guidance for accuracy of injection. A [employer] ultrasound unit with a variable frequency linear transducer was used for this procedure. Ethyl chloride vapocoolant spray was applied.The ultrasound unit was used to identify the bicep tendon. The skin was preped with alcohol, and using ultrasound guidance and a no touch, aseptic technique, a 25 gauge, 1.5\" needle was inserted, confirmation of needle placement was noted on the ultrasound unit.  1 ml of Kenalog 40mg and 1 ml of 0.25% Marcaine was injected into the anterior aspect into the bicep tendon of the Right shoulder. The patient tolerated the injections well. I will see the patient back in 6 weeks. Images are stored and uploaded into the [employer].      Encounter Diagnoses   Name Primary?    Impingement of right shoulder Yes    Tendonitis of long head of biceps brachii of right shoulder        Plan:  She tolerated injections well.  I will follow up with her in 6 weeks.  
03-Nov-2018 14:04

## 2024-06-26 ENCOUNTER — HOSPITAL ENCOUNTER (OUTPATIENT)
Dept: PHYSICAL THERAPY | Age: 44
Setting detail: THERAPIES SERIES
Discharge: HOME | End: 2024-06-26
Payer: COMMERCIAL

## 2024-06-26 NOTE — PROGRESS NOTES
Physical Therapy - Cancellation    6/26/2024    MRN: [id number]  Awa Voss      Patient called  06/25/24 after having shoulder injection. Dr tucker instructed her to hold on PT today 06/26/24, wait until Thursday. We do not have appointments available Thursday, patient will attend Friday.  Uma Viera, PTA

## 2024-06-28 ENCOUNTER — HOSPITAL ENCOUNTER (OUTPATIENT)
Dept: PHYSICAL THERAPY | Age: 44
Setting detail: THERAPIES SERIES
Discharge: HOME | End: 2024-06-28
Payer: COMMERCIAL

## 2024-06-28 PROCEDURE — 97110 THERAPEUTIC EXERCISES: CPT

## 2024-06-28 NOTE — PROGRESS NOTES
[facility]  Physical Therapy Daily Treatment Note  Date: 2024  Patient Name: Awa Voss  : 1980   MRN: [id number]  DOInjury: 2024  DOSx: na   Referring Provider: Henrik Albarran DO  [address] [place] [address]     Medical Diagnosis:  s46.911a shoulder strain, s16.1xxa cerv strain     Outcome Measure:NDI= 34, 68%    S: Patient reports pain 8/10 right shoulder after injection on Tuesday. Injection was painful, no changes yet. She had disrupted sleep last night due to pain. She RTD Avis around second week July. Her C9 for PT ends today. See Re-eval from 24, requesting extension of PT.  O:    Time 6248-6036 3x/wk, 4-6 weeks   C9  thru 24   +12 thru 24   Visit 17/ C9  ended   thru 24    Pain 8/10    ROM Limited right shoulder flex and cerv SB L    Modalities     MH & ES to C/S and shoulders ES: across neck into R shldr   THEREX     UBE L4 x 2 min FWD  L4 x 2 min Retro    Pulleys shoulder flex X 3 min     Shrugs     Shld flex 1# DB x 20    Shldr abd 1# DB x 20    Overhead press 1# DB x 20    Wand IR stretch X 2 min    Supine ER stretch hands behind head 6 x 10 sec     Pull downs Green x 20    ROWS: M Green x 20    Punch regular  Green x 20 Right    ROWS: L Green x 20    ER Green 2 x 10 right    IR Green 2 x 10 right    Functional activities To aid in ROM and strength needed for reaching, lifting, pushing and pulling at home/work    Diag down Green x 20 each     Diag up Red x 20 D2  Green x 20 D1              A:  Patient tolerated well. Some difficulty with overhead press.   P: Continue with rehab plan.  Uma Viera PTA    Treatment Charges: TIMED Mins Time in Time out Units    04404 Initial Evaluation        44121 Ther Exercise         TE  24 0938 1002 2   00110 Manual Therapy     MT       22039 Ther Activities        TA 4 1002 1006 0   91716 Gait Training          GT       34355 Neuro Re-education NR       Non-Billable Service Time

## 2024-07-05 ENCOUNTER — APPOINTMENT (OUTPATIENT)
Dept: GENERAL RADIOLOGY | Age: 44
End: 2024-07-05
Payer: COMMERCIAL

## 2024-07-05 ENCOUNTER — HOSPITAL ENCOUNTER (EMERGENCY)
Age: 44
Discharge: HOME | End: 2024-07-05
Attending: FAMILY MEDICINE
Payer: COMMERCIAL

## 2024-07-05 VITALS
BODY MASS INDEX: 38.41 KG/M2 | SYSTOLIC BLOOD PRESSURE: 166 MMHG | HEART RATE: 79 BPM | WEIGHT: 225 LBS | RESPIRATION RATE: 16 BRPM | TEMPERATURE: 98.1 F | OXYGEN SATURATION: 100 % | HEIGHT: 64 IN | DIASTOLIC BLOOD PRESSURE: 101 MMHG

## 2024-07-05 DIAGNOSIS — R10.13 DYSPEPSIA: Primary | ICD-10-CM

## 2024-07-05 DIAGNOSIS — S39.012A STRAIN OF LUMBAR REGION, INITIAL ENCOUNTER: ICD-10-CM

## 2024-07-05 DIAGNOSIS — M47.816 ARTHRITIS, LUMBAR SPINE: ICD-10-CM

## 2024-07-05 LAB — HCG UR QL: NEGATIVE

## 2024-07-05 PROCEDURE — 99284 EMERGENCY DEPT VISIT MOD MDM: CPT

## 2024-07-05 PROCEDURE — 6370000000 HC RX 637 (ALT 250 FOR IP): Performed by: FAMILY MEDICINE

## 2024-07-05 PROCEDURE — 6360000002 HC RX W HCPCS: Performed by: FAMILY MEDICINE

## 2024-07-05 PROCEDURE — 96372 THER/PROPH/DIAG INJ SC/IM: CPT

## 2024-07-05 PROCEDURE — 84703 CHORIONIC GONADOTROPIN ASSAY: CPT

## 2024-07-05 PROCEDURE — 72100 X-RAY EXAM L-S SPINE 2/3 VWS: CPT

## 2024-07-05 RX ORDER — ONDANSETRON 4 MG/1
4 TABLET, FILM COATED ORAL
Qty: 5 TABLET | Refills: 0 | Status: SHIPPED | OUTPATIENT
Start: 2024-07-05

## 2024-07-05 RX ORDER — OMEPRAZOLE 20 MG/1
20 CAPSULE, DELAYED RELEASE ORAL EVERY 12 HOURS
Qty: 28 CAPSULE | Refills: 0 | Status: SHIPPED | OUTPATIENT
Start: 2024-07-05 | End: 2024-07-19

## 2024-07-05 RX ORDER — DEXAMETHASONE SODIUM PHOSPHATE 10 MG/ML
8 INJECTION, SOLUTION INTRAMUSCULAR; INTRAVENOUS ONCE
Status: COMPLETED | OUTPATIENT
Start: 2024-07-05 | End: 2024-07-05

## 2024-07-05 RX ADMIN — LIDOCAINE HYDROCHLORIDE: 20 SOLUTION ORAL at 13:46

## 2024-07-05 RX ADMIN — DEXAMETHASONE SODIUM PHOSPHATE 8 MG: 10 INJECTION, SOLUTION INTRAMUSCULAR; INTRAVENOUS at 14:54

## 2024-07-05 ASSESSMENT — PAIN - FUNCTIONAL ASSESSMENT: PAIN_FUNCTIONAL_ASSESSMENT: 0-10

## 2024-07-05 ASSESSMENT — PAIN SCALES - GENERAL: PAINLEVEL_OUTOF10: 10

## 2024-07-05 NOTE — ED PROVIDER NOTES
HPI:  24,   Time: 12:54 PM EDT         Awa Voss is a 43 y.o. female presenting to the ED for 3-day history of low back pain, mid and on the left side of the back and bilateral cramping of the thigh areas.  She denied radiation of pain to the buttocks or to other parts of the lower extremities.  She denied paresthesias or weakness of the distal lower extremities.  She denied injury.  She denied previous problem with the lumbar area.  She has had cervical pain in the past and has a problem with chronic right shoulder rotator cuff tendinitis and partial tear.  She also been complaining of heartburn-like symptoms and bad taste in her mouth for about 3 days as well.  She had similar symptoms over a year ago and told her gallbladder was removed and no symptoms seem to resolve until recently.  She also has noticed change in her menstrual cycles, she missed her menstruation, regular schedule, earlier this month.  Her last menstrual period was on , approximately 36 days ago.  She usually is regular with her menstrual cycles.        ROS:   Pertinent positives and negatives are stated within HPI, all other systems reviewed and are negative.  --------------------------------------------- PAST HISTORY ---------------------------------------------  Past Medical History:  has a past medical history of Arthritis, COVID, Hypertension, Iron deficiency anemia due to chronic blood loss, Iron deficiency anemia due to chronic blood loss, Obesity (BMI 35.0-39.9 without comorbidity), and Primary hypertension.    Past Surgical History:  has a past surgical history that includes Abdomen surgery; Berger tooth extraction;  section (,,,,); Upper gastrointestinal endoscopy (2016); Shannon-en-Y Gastric Bypass (2017); Upper gastrointestinal endoscopy (N/A, 10/9/2020); hiatal hernia repair (N/A, 2020); Dilation and curettage of uterus (N/A, 2021); Upper gastrointestinal endoscopy

## 2024-07-30 ENCOUNTER — OFFICE VISIT (OUTPATIENT)
Dept: ORTHOPEDIC SURGERY | Age: 44
End: 2024-07-30
Payer: COMMERCIAL

## 2024-07-30 VITALS — BODY MASS INDEX: 40.12 KG/M2 | WEIGHT: 235 LBS | HEIGHT: 64 IN

## 2024-07-30 DIAGNOSIS — M75.101 TEAR OF RIGHT ROTATOR CUFF, UNSPECIFIED TEAR EXTENT, UNSPECIFIED WHETHER TRAUMATIC: ICD-10-CM

## 2024-07-30 DIAGNOSIS — M25.811 IMPINGEMENT OF RIGHT SHOULDER: Primary | ICD-10-CM

## 2024-07-30 DIAGNOSIS — M75.21 TENDONITIS OF LONG HEAD OF BICEPS BRACHII OF RIGHT SHOULDER: ICD-10-CM

## 2024-07-30 PROCEDURE — 99213 OFFICE O/P EST LOW 20 MIN: CPT | Performed by: ORTHOPAEDIC SURGERY

## 2024-07-30 NOTE — PROGRESS NOTES
Chief Complaint   Patient presents with    Shoulder Pain     WC Right shoulder & right bicep, pt states she feels cortisone inj last visit made pain worse        Awa Voss is a 43 y.o. year old   female who is seen today for follow up of her [facility] injury.  She has failed all attempts at conservative treatment.  The injection she had only lasted 2-3 days.      Chief Complaint   Patient presents with    Shoulder Pain     WC Right shoulder & right bicep, pt states she feels cortisone inj last visit made pain worse     Past Medical History:   Diagnosis Date    Arthritis     COVID     SOB ever since, on inhaler, improving per pt    Hypertension     during pregnancy    Iron deficiency anemia due to chronic blood loss 2023    Iron deficiency anemia due to chronic blood loss 2023    Obesity (BMI 35.0-39.9 without comorbidity) 2023    Primary hypertension 2023     Past Surgical History:   Procedure Laterality Date    ABDOMEN SURGERY       SECTION  ,,,,    DILATION AND CURETTAGE OF UTERUS N/A 2021    IUD REMOVAL (ASHLY) (CPT 23376) performed by Denis Colin MD at [facility] OR    HIATAL HERNIA REPAIR N/A 2020    LAPAROSCOPIC HIATAL HERNIA REPAIR WITH MESH, TRUNCAL VAGOTOMY performed by Patrick Sam MD at [facility] OR    LAPAROSCOPY N/A 10/24/2023    LAPAROSCOPY DIAGNOSTIC diagnostic laparoscopy with reduction and closure of internal hernia and possible revision of GJ anastomosis performed by Patrick Sam MD at [facility] OR    KATRINA-EN-Y GASTRIC BYPASS  2017    Laparascopic    UPPER GASTROINTESTINAL ENDOSCOPY  2016    Dr. Sam    UPPER GASTROINTESTINAL ENDOSCOPY N/A 10/9/2020    EGD BIOPSY performed by Patrick Sam MD at [facility] ENDOSCOPY    UPPER GASTROINTESTINAL ENDOSCOPY N/A 2023    EGD ESOPHAGOGASTRODUODENOSCOPY performed by John Gonzalez MD at [facility] ENDOSCOPY    WISDOM TOOTH EXTRACTION         Current Outpatient Medications:

## 2024-08-02 ENCOUNTER — HOSPITAL ENCOUNTER (OUTPATIENT)
Age: 44
Discharge: HOME | End: 2024-08-02
Payer: COMMERCIAL

## 2024-08-02 DIAGNOSIS — K91.2 MALNUTRITION FOLLOWING GASTROINTESTINAL SURGERY: ICD-10-CM

## 2024-08-02 LAB
25(OH)D3 SERPL-MCNC: 20.3 NG/ML (ref 30–100)
ALBUMIN SERPL-MCNC: 3.9 G/DL (ref 3.5–5.2)
ALP SERPL-CCNC: 114 U/L (ref 35–104)
ALT SERPL-CCNC: 14 U/L (ref 0–32)
ANION GAP SERPL CALCULATED.3IONS-SCNC: 12 MMOL/L (ref 7–16)
AST SERPL-CCNC: 19 U/L (ref 0–31)
BILIRUB SERPL-MCNC: 0.3 MG/DL (ref 0–1.2)
BUN SERPL-MCNC: 7 MG/DL (ref 6–20)
CALCIUM SERPL-MCNC: 8.4 MG/DL (ref 8.6–10.2)
CHLORIDE SERPL-SCNC: 109 MMOL/L (ref 98–107)
CHOLEST SERPL-MCNC: 162 MG/DL
CO2 SERPL-SCNC: 18 MMOL/L (ref 22–29)
CREAT SERPL-MCNC: 0.7 MG/DL (ref 0.5–1)
ERYTHROCYTE [DISTWIDTH] IN BLOOD BY AUTOMATED COUNT: 19.9 % (ref 11.5–15)
FERRITIN SERPL-MCNC: 10 NG/ML
GFR, ESTIMATED: >90 ML/MIN/1.73M2
GLUCOSE SERPL-MCNC: 80 MG/DL (ref 74–99)
HCT VFR BLD AUTO: 24.4 % (ref 34–48)
HDLC SERPL-MCNC: 65 MG/DL
HGB BLD-MCNC: 7 G/DL (ref 11.5–15.5)
LDLC SERPL CALC-MCNC: 71 MG/DL
MCH RBC QN AUTO: 18.8 PG (ref 26–35)
MCHC RBC AUTO-ENTMCNC: 28.7 G/DL (ref 32–34.5)
MCV RBC AUTO: 65.6 FL (ref 80–99.9)
PLATELET # BLD AUTO: 254 K/UL (ref 130–450)
PMV BLD AUTO: 8.9 FL (ref 7–12)
POTASSIUM SERPL-SCNC: 3.8 MMOL/L (ref 3.5–5)
PREALB SERPL-MCNC: 26 MG/DL (ref 20–40)
PROT SERPL-MCNC: 6.5 G/DL (ref 6.4–8.3)
RBC # BLD AUTO: 3.72 M/UL (ref 3.5–5.5)
SODIUM SERPL-SCNC: 139 MMOL/L (ref 132–146)
TRIGL SERPL-MCNC: 132 MG/DL
VLDLC SERPL CALC-MCNC: 26 MG/DL
WBC OTHER # BLD: 3.7 K/UL (ref 4.5–11.5)

## 2024-08-02 PROCEDURE — 82728 ASSAY OF FERRITIN: CPT

## 2024-08-02 PROCEDURE — 84134 ASSAY OF PREALBUMIN: CPT

## 2024-08-02 PROCEDURE — 82306 VITAMIN D 25 HYDROXY: CPT

## 2024-08-02 PROCEDURE — 80061 LIPID PANEL: CPT

## 2024-08-02 PROCEDURE — 84630 ASSAY OF ZINC: CPT

## 2024-08-02 PROCEDURE — 36415 COLL VENOUS BLD VENIPUNCTURE: CPT

## 2024-08-02 PROCEDURE — 84425 ASSAY OF VITAMIN B-1: CPT

## 2024-08-02 PROCEDURE — 85027 COMPLETE CBC AUTOMATED: CPT

## 2024-08-02 PROCEDURE — 84590 ASSAY OF VITAMIN A: CPT

## 2024-08-02 PROCEDURE — 80053 COMPREHEN METABOLIC PANEL: CPT

## 2024-08-02 PROCEDURE — 82525 ASSAY OF COPPER: CPT

## 2024-08-04 LAB — ZINC SERPL-MCNC: 62.3 UG/DL (ref 60–120)

## 2024-08-05 LAB — COPPER SERPL-MCNC: 61.3 UG/DL (ref 80–155)

## 2024-08-06 LAB
RETINYL PALMITATE: <0.02 MG/L (ref 0–0.1)
VITAMIN A LEVEL: 0.51 MG/L (ref 0.3–1.2)
VITAMIN A, INTERP: NORMAL

## 2024-08-09 LAB — VIT B1 PYROPHOSHATE BLD-SCNC: 80 NMOL/L (ref 70–180)

## 2024-08-14 ENCOUNTER — TELEPHONE (OUTPATIENT)
Dept: INFUSION THERAPY | Age: 44
End: 2024-08-14

## 2024-08-16 ENCOUNTER — OFFICE VISIT (OUTPATIENT)
Dept: CARDIOLOGY CLINIC | Age: 44
End: 2024-08-16
Payer: COMMERCIAL

## 2024-08-16 ENCOUNTER — HOSPITAL ENCOUNTER (EMERGENCY)
Age: 44
Discharge: HOME | End: 2024-08-16
Attending: EMERGENCY MEDICINE
Payer: COMMERCIAL

## 2024-08-16 VITALS
RESPIRATION RATE: 20 BRPM | OXYGEN SATURATION: 100 % | DIASTOLIC BLOOD PRESSURE: 61 MMHG | HEART RATE: 81 BPM | SYSTOLIC BLOOD PRESSURE: 116 MMHG | TEMPERATURE: 99.8 F

## 2024-08-16 VITALS
DIASTOLIC BLOOD PRESSURE: 88 MMHG | WEIGHT: 247 LBS | HEART RATE: 91 BPM | RESPIRATION RATE: 16 BRPM | SYSTOLIC BLOOD PRESSURE: 124 MMHG | HEIGHT: 64 IN | BODY MASS INDEX: 42.17 KG/M2

## 2024-08-16 DIAGNOSIS — I10 PRIMARY HYPERTENSION: Primary | ICD-10-CM

## 2024-08-16 DIAGNOSIS — D64.9 ANEMIA, UNSPECIFIED TYPE: Primary | ICD-10-CM

## 2024-08-16 LAB
ANION GAP SERPL CALCULATED.3IONS-SCNC: 13 MMOL/L (ref 7–16)
BASOPHILS # BLD: 0.04 K/UL (ref 0–0.2)
BASOPHILS NFR BLD: 1 % (ref 0–2)
BUN SERPL-MCNC: 11 MG/DL (ref 6–20)
CALCIUM SERPL-MCNC: 8.4 MG/DL (ref 8.6–10.2)
CHLORIDE SERPL-SCNC: 107 MMOL/L (ref 98–107)
CO2 SERPL-SCNC: 18 MMOL/L (ref 22–29)
CREAT SERPL-MCNC: 0.8 MG/DL (ref 0.5–1)
EOSINOPHIL # BLD: 0.09 K/UL (ref 0.05–0.5)
EOSINOPHILS RELATIVE PERCENT: 2 % (ref 0–6)
ERYTHROCYTE [DISTWIDTH] IN BLOOD BY AUTOMATED COUNT: 19.5 % (ref 11.5–15)
GFR, ESTIMATED: >90 ML/MIN/1.73M2
GLUCOSE SERPL-MCNC: 81 MG/DL (ref 74–99)
HCG, URINE, POC: NEGATIVE
HCT VFR BLD AUTO: 24.2 % (ref 34–48)
HGB BLD-MCNC: 6.9 G/DL (ref 11.5–15.5)
IMM GRANULOCYTES # BLD AUTO: 0.03 K/UL (ref 0–0.58)
IMM GRANULOCYTES NFR BLD: 1 % (ref 0–5)
LYMPHOCYTES NFR BLD: 1.59 K/UL (ref 1.5–4)
LYMPHOCYTES RELATIVE PERCENT: 28 % (ref 20–42)
Lab: NORMAL
MAGNESIUM SERPL-MCNC: 2 MG/DL (ref 1.6–2.6)
MCH RBC QN AUTO: 18.1 PG (ref 26–35)
MCHC RBC AUTO-ENTMCNC: 28.5 G/DL (ref 32–34.5)
MCV RBC AUTO: 63.5 FL (ref 80–99.9)
MONOCYTES NFR BLD: 0.72 K/UL (ref 0.1–0.95)
MONOCYTES NFR BLD: 13 % (ref 2–12)
NEGATIVE QC PASS/FAIL: NORMAL
NEUTROPHILS NFR BLD: 57 % (ref 43–80)
NEUTS SEG NFR BLD: 3.22 K/UL (ref 1.8–7.3)
PLATELET # BLD AUTO: 332 K/UL (ref 130–450)
PMV BLD AUTO: 9 FL (ref 7–12)
POSITIVE QC PASS/FAIL: NORMAL
POTASSIUM SERPL-SCNC: 3.7 MMOL/L (ref 3.5–5)
RBC # BLD AUTO: 3.81 M/UL (ref 3.5–5.5)
SODIUM SERPL-SCNC: 138 MMOL/L (ref 132–146)
TROPONIN I SERPL HS-MCNC: 7 NG/L (ref 0–9)
WBC OTHER # BLD: 5.7 K/UL (ref 4.5–11.5)

## 2024-08-16 PROCEDURE — 86923 COMPATIBILITY TEST ELECTRIC: CPT

## 2024-08-16 PROCEDURE — 3079F DIAST BP 80-89 MM HG: CPT | Performed by: INTERNAL MEDICINE

## 2024-08-16 PROCEDURE — 80048 BASIC METABOLIC PNL TOTAL CA: CPT

## 2024-08-16 PROCEDURE — G8427 DOCREV CUR MEDS BY ELIG CLIN: HCPCS | Performed by: INTERNAL MEDICINE

## 2024-08-16 PROCEDURE — 85025 COMPLETE CBC W/AUTO DIFF WBC: CPT

## 2024-08-16 PROCEDURE — 83735 ASSAY OF MAGNESIUM: CPT

## 2024-08-16 PROCEDURE — 93000 ELECTROCARDIOGRAM COMPLETE: CPT | Performed by: INTERNAL MEDICINE

## 2024-08-16 PROCEDURE — 93005 ELECTROCARDIOGRAM TRACING: CPT | Performed by: EMERGENCY MEDICINE

## 2024-08-16 PROCEDURE — 86901 BLOOD TYPING SEROLOGIC RH(D): CPT

## 2024-08-16 PROCEDURE — 86850 RBC ANTIBODY SCREEN: CPT

## 2024-08-16 PROCEDURE — 86900 BLOOD TYPING SEROLOGIC ABO: CPT

## 2024-08-16 PROCEDURE — 96375 TX/PRO/DX INJ NEW DRUG ADDON: CPT

## 2024-08-16 PROCEDURE — 36430 TRANSFUSION BLD/BLD COMPNT: CPT

## 2024-08-16 PROCEDURE — G8417 CALC BMI ABV UP PARAM F/U: HCPCS | Performed by: INTERNAL MEDICINE

## 2024-08-16 PROCEDURE — 2580000003 HC RX 258

## 2024-08-16 PROCEDURE — 99213 OFFICE O/P EST LOW 20 MIN: CPT | Performed by: INTERNAL MEDICINE

## 2024-08-16 PROCEDURE — 3074F SYST BP LT 130 MM HG: CPT | Performed by: INTERNAL MEDICINE

## 2024-08-16 PROCEDURE — 6360000002 HC RX W HCPCS: Performed by: EMERGENCY MEDICINE

## 2024-08-16 PROCEDURE — P9016 RBC LEUKOCYTES REDUCED: HCPCS

## 2024-08-16 PROCEDURE — 96374 THER/PROPH/DIAG INJ IV PUSH: CPT

## 2024-08-16 PROCEDURE — 84484 ASSAY OF TROPONIN QUANT: CPT

## 2024-08-16 PROCEDURE — 1036F TOBACCO NON-USER: CPT | Performed by: INTERNAL MEDICINE

## 2024-08-16 PROCEDURE — 99285 EMERGENCY DEPT VISIT HI MDM: CPT

## 2024-08-16 PROCEDURE — 6370000000 HC RX 637 (ALT 250 FOR IP): Performed by: EMERGENCY MEDICINE

## 2024-08-16 RX ORDER — ACETAMINOPHEN 325 MG/1
650 TABLET ORAL ONCE
Status: COMPLETED | OUTPATIENT
Start: 2024-08-16 | End: 2024-08-16

## 2024-08-16 RX ORDER — DIPHENHYDRAMINE HYDROCHLORIDE 50 MG/ML
25 INJECTION INTRAMUSCULAR; INTRAVENOUS ONCE
Status: COMPLETED | OUTPATIENT
Start: 2024-08-16 | End: 2024-08-16

## 2024-08-16 RX ORDER — SODIUM CHLORIDE 9 MG/ML
INJECTION, SOLUTION INTRAVENOUS PRN
Status: COMPLETED | OUTPATIENT
Start: 2024-08-16 | End: 2024-08-16

## 2024-08-16 RX ORDER — METOCLOPRAMIDE HYDROCHLORIDE 5 MG/ML
10 INJECTION INTRAMUSCULAR; INTRAVENOUS ONCE
Status: COMPLETED | OUTPATIENT
Start: 2024-08-16 | End: 2024-08-16

## 2024-08-16 RX ORDER — SODIUM CHLORIDE 9 MG/ML
INJECTION, SOLUTION INTRAVENOUS
Status: COMPLETED
Start: 2024-08-16 | End: 2024-08-16

## 2024-08-16 RX ADMIN — DIPHENHYDRAMINE HYDROCHLORIDE 25 MG: 50 INJECTION INTRAMUSCULAR; INTRAVENOUS at 21:02

## 2024-08-16 RX ADMIN — SODIUM CHLORIDE: 9 INJECTION, SOLUTION INTRAVENOUS at 21:07

## 2024-08-16 RX ADMIN — METOCLOPRAMIDE 10 MG: 5 INJECTION, SOLUTION INTRAMUSCULAR; INTRAVENOUS at 21:04

## 2024-08-16 RX ADMIN — ACETAMINOPHEN 650 MG: 325 TABLET ORAL at 21:02

## 2024-08-16 ASSESSMENT — ENCOUNTER SYMPTOMS
BLOOD IN STOOL: 0
VOMITING: 0
COUGH: 0
SHORTNESS OF BREATH: 0
BACK PAIN: 0
SINUS PRESSURE: 0
SORE THROAT: 0
CHEST TIGHTNESS: 0
NAUSEA: 0
DIARRHEA: 0
WHEEZING: 0
ABDOMINAL PAIN: 0

## 2024-08-16 ASSESSMENT — PAIN SCALES - GENERAL: PAINLEVEL_OUTOF10: 9

## 2024-08-16 ASSESSMENT — PAIN DESCRIPTION - LOCATION: LOCATION: HEAD

## 2024-08-16 ASSESSMENT — LIFESTYLE VARIABLES: HOW OFTEN DO YOU HAVE A DRINK CONTAINING ALCOHOL: NEVER

## 2024-08-16 NOTE — ED PROVIDER NOTES
Chief complaint:  Anemia      HPI history provided by patient  Patient resents here with history of chronic anemia, has had blood transfusions in the past, states she still being worked up for, she is following with cardiology as well as hematology.  They sent her in because she is now back down to 7 and needs a blood transfusion.  Denies any actual rectal bleeding and denies heavy vaginal bleeding.  No chest pain or palpitations or shortness of breath but feels fatigued and weak particular with exertion.    Review of Systems   Constitutional:  Positive for fatigue. Negative for chills, diaphoresis and fever.   HENT:  Negative for congestion, sinus pressure and sore throat.    Respiratory:  Negative for cough, chest tightness, shortness of breath and wheezing.    Cardiovascular:  Negative for chest pain, palpitations and leg swelling.   Gastrointestinal:  Negative for abdominal pain, blood in stool, diarrhea, nausea and vomiting.   Genitourinary:  Negative for dysuria, flank pain, frequency and urgency.   Musculoskeletal:  Negative for arthralgias, back pain, gait problem, joint swelling, myalgias, neck pain and neck stiffness.   Skin:  Negative for rash and wound.   Neurological:  Negative for dizziness, seizures, syncope, weakness, light-headedness, numbness and headaches.   All other systems reviewed and are negative.       Physical Exam  Vitals and nursing note reviewed.   Constitutional:       General: She is awake. She is not in acute distress.     Appearance: She is well-developed. She is obese. She is not ill-appearing, toxic-appearing or diaphoretic.   HENT:      Head: Normocephalic and atraumatic.   Eyes:      General: No scleral icterus.     Pupils: Pupils are equal, round, and reactive to light.   Cardiovascular:      Rate and Rhythm: Normal rate and regular rhythm.      Heart sounds: Normal heart sounds. No murmur heard.  Pulmonary:      Effort: Pulmonary effort is normal. No respiratory distress.       (08/12/2016); Shannon-en-Y Gastric Bypass (08/14/2017); Upper gastrointestinal endoscopy (N/A, 10/9/2020); hiatal hernia repair (N/A, 11/16/2020); Dilation and curettage of uterus (N/A, 1/11/2021); Upper gastrointestinal endoscopy (N/A, 5/25/2023); and laparoscopy (N/A, 10/24/2023).    Social History:  reports that she quit smoking about 12 years ago. Her smoking use included cigarettes. She has never used smokeless tobacco. She reports that she does not drink alcohol and does not use drugs.    Family History: family history includes Hearing Loss in her paternal cousin; Heart Disease in her mother.     The patient’s home medications have been reviewed.    Allergies: Ibuprofen    -------------------------------------------------- RESULTS -------------------------------------------------  Labs:  Results for orders placed or performed during the hospital encounter of 08/16/24   CBC with Auto Differential   Result Value Ref Range    WBC 5.7 4.5 - 11.5 k/uL    RBC 3.81 3.50 - 5.50 m/uL    Hemoglobin 6.9 (LL) 11.5 - 15.5 g/dL    Hematocrit 24.2 (L) 34.0 - 48.0 %    MCV 63.5 (L) 80.0 - 99.9 fL    MCH 18.1 (L) 26.0 - 35.0 pg    MCHC 28.5 (L) 32.0 - 34.5 g/dL    RDW 19.5 (H) 11.5 - 15.0 %    Platelets 332 130 - 450 k/uL    MPV 9.0 7.0 - 12.0 fL    Neutrophils % 57 43.0 - 80.0 %    Lymphocytes % 28 20.0 - 42.0 %    Monocytes % 13 (H) 2.0 - 12.0 %    Eosinophils % 2 0 - 6 %    Basophils % 1 0.0 - 2.0 %    Immature Granulocytes % 1 0.0 - 5.0 %    Neutrophils Absolute 3.22 1.80 - 7.30 k/uL    Lymphocytes Absolute 1.59 1.50 - 4.00 k/uL    Monocytes Absolute 0.72 0.10 - 0.95 k/uL    Eosinophils Absolute 0.09 0.05 - 0.50 k/uL    Basophils Absolute 0.04 0.00 - 0.20 k/uL    Immature Granulocytes Absolute 0.03 0.00 - 0.58 k/uL   Basic Metabolic Panel   Result Value Ref Range    Sodium 138 132 - 146 mmol/L    Potassium 3.7 3.5 - 5.0 mmol/L    Chloride 107 98 - 107 mmol/L    CO2 18 (L) 22 - 29 mmol/L    Anion Gap 13 7 - 16 mmol/L

## 2024-08-16 NOTE — CONSENT
Informed Consent for Blood Component Transfusion Note    I have discussed with the patient the rationale for blood component transfusion; its benefits in treating or preventing fatigue, organ damage, or death; and its risk which includes mild transfusion reactions, rare risk of blood borne infection, or more serious but rare reactions. I have discussed the alternatives to transfusion, including the risk and consequences of not receiving transfusion. The patient had an opportunity to ask questions and had agreed to proceed with transfusion of blood components.    Electronically signed by Channing Berg DO on 8/16/24 at 6:15 PM EDT

## 2024-08-16 NOTE — PROGRESS NOTES
08/02/2024 01:24 PM    MCV 65.6 08/02/2024 01:24 PM    RDW 19.9 08/02/2024 01:24 PM     08/02/2024 01:24 PM     PT/INR:  No results found for: \"PTINR\"  PT/INR Warfarin:  No components found for: \"PTPATWAR\", \"PTINRWAR\"  PTT:  No results found for: \"APTT\"  PTT Heparin:  No components found for: \"APTTHEP\"  Magnesium:    Lab Results   Component Value Date/Time    MG 2.0 05/27/2023 03:19 AM     TSH:    Lab Results   Component Value Date/Time    TSH 0.802 05/25/2023 06:47 AM     TROPONIN:  No components found for: \"TROP\"  BNP:  No results found for: \"BNP\"  FASTING LIPID PANEL:    Lab Results   Component Value Date/Time    CHOL 162 08/02/2024 01:24 PM    HDL 65 08/02/2024 01:24 PM    TRIG 132 08/02/2024 01:24 PM     No orders to display     I have personally reviewed the laboratory, cardiac diagnostic and radiographic testing as outlined above:      IMPRESSION:  1.  Shortness of breath: Doubt cardiac, suspect secondary to anemia, hemoglobin was 7.6 in May, down to 7 2 weeks ago, patient advised to go to the emergency room or urgent care to get checked for further evaluation.  2.  Hypertension: Controlled  3.  Anemia:  4.  S/p lap internal hernia repair with revision of GJ anastomosis.     RECOMMENDATIONS:   1.  Advised to seek medical attention at the urgent care or emergency room for further evaluation  2.  Continue current treatment  3.  Follow-up with Dr. Heard as scheduled  4.  Follow-up with Dr. Gillespie in 6 months, sooner if symptomatic for any reason    I have reviewed my findings and recommendations with patient    Electronically signed by Vito Gillespie MD on 8/16/2024 at 4:10 PM      NOTE: This report was transcribed using voice recognition software. Every effort was made to ensure accuracy; however, inadvertent computerized transcription errors may be present

## 2024-08-17 LAB
ABO/RH: NORMAL
ANTIBODY SCREEN: NEGATIVE
ARM BAND NUMBER: NORMAL
BLOOD BANK BLOOD PRODUCT EXPIRATION DATE: NORMAL
BLOOD BANK DISPENSE STATUS: NORMAL
BLOOD BANK ISBT PRODUCT BLOOD TYPE: 5100
BLOOD BANK PRODUCT CODE: NORMAL
BLOOD BANK SAMPLE EXPIRATION: NORMAL
BLOOD BANK UNIT TYPE AND RH: NORMAL
BPU ID: NORMAL
COMPONENT: NORMAL
CROSSMATCH RESULT: NORMAL
TRANSFUSION STATUS: NORMAL
UNIT DIVISION: 0
UNIT ISSUE DATE/TIME: NORMAL

## 2024-08-17 NOTE — DISCHARGE INSTR - COC
Continuity of Care Form    Patient Name: Awa Voss   :  1980  MRN:  [id number]    Admit date:  2024  Discharge date:  ***    Code Status Order: Prior   Advance Directives:   Advance Care Flowsheet Documentation             Admitting Physician:  No admitting provider for patient encounter.  PCP: Mora Heard DO    Discharging Nurse: ***  Discharging Hospital Unit/Room#: HEATHER/HEATHER  Discharging Unit Phone Number: ***    Emergency Contact:   Extended Emergency Contact Information  Primary Emergency Contact: Manjula Voss  Address: [address] [place] [address]  Home Phone: [phone]  Mobile Phone: [phone]  Relation: Parent  Secondary Emergency Contact: Angelica Voss  Mobile Phone: [phone]  Relation: Child    Past Surgical History:  Past Surgical History:   Procedure Laterality Date    ABDOMEN SURGERY       SECTION  ,,,,    DILATION AND CURETTAGE OF UTERUS N/A 2021    IUD REMOVAL (ASHLY) (CPT 50248) performed by Denis Colin MD at [facility] OR    HIATAL HERNIA REPAIR N/A 2020    LAPAROSCOPIC HIATAL HERNIA REPAIR WITH MESH, TRUNCAL VAGOTOMY performed by Patrick Sam MD at [facility] OR    LAPAROSCOPY N/A 10/24/2023    LAPAROSCOPY DIAGNOSTIC diagnostic laparoscopy with reduction and closure of internal hernia and possible revision of GJ anastomosis performed by Patrick Sam MD at [facility] OR    KATRINA-EN-Y GASTRIC BYPASS  2017    Laparascopic    UPPER GASTROINTESTINAL ENDOSCOPY  2016    Dr. Sam    UPPER GASTROINTESTINAL ENDOSCOPY N/A 10/9/2020    EGD BIOPSY performed by Patrick Sam MD at [facility] ENDOSCOPY    UPPER GASTROINTESTINAL ENDOSCOPY N/A 2023    EGD ESOPHAGOGASTRODUODENOSCOPY performed by John Gonzalez MD at [facility] ENDOSCOPY    WISDOM TOOTH EXTRACTION         Immunization History:   Immunization History   Administered Date(s) Administered    TDaP, ADACEL (age 10y-64y), BOOSTRIX (age 10y+), IM, 0.5mL 2014  2158  Gross per 24 hour   Intake 350 ml   Output --   Net 350 ml     No intake/output data recorded.    Safety Concerns:     { DANIEL Safety Concerns:234044298}    Impairments/Disabilities:      { DANIEL Impairments/Disabilities:596452964}    Nutrition Therapy:  Current Nutrition Therapy:   { DANIEL Diet List:484177969}    Routes of Feeding: {CHP DME Other Feedings:293707777}  Liquids: {Slp liquid thickness:41291}  Daily Fluid Restriction: {P DME Yes amt example:246534981}  Last Modified Barium Swallow with Video (Video Swallowing Test): {Done Not Done Date:}    Treatments at the Time of Hospital Discharge:   Respiratory Treatments: ***  Oxygen Therapy:  {Therapy; copd oxygen:65988}  Ventilator:    { CC Vent List:799745971}    Rehab Therapies: {THERAPEUTIC INTERVENTION:1361882992}  Weight Bearing Status/Restrictions: {[facility] Weight Bearin}  Other Medical Equipment (for information only, NOT a DME order):  {EQUIPMENT:607853779}  Other Treatments: ***    Patient's personal belongings (please select all that are sent with patient):  {[facility] DME Belongings:653856666}    RN SIGNATURE:  {Esignature:708001085}    CASE MANAGEMENT/SOCIAL WORK SECTION    Inpatient Status Date: ***    Readmission Risk Assessment Score:  Readmission Risk              Risk of Unplanned Readmission:  0           Discharging to Facility/ Agency   Name:   Address:  Phone:  Fax:    Dialysis Facility (if applicable)   Name:  Address:  Dialysis Schedule:  Phone:  Fax:    / signature: {Esignature:704960190}    PHYSICIAN SECTION    Prognosis: {Prognosis:9935759753}    Condition at Discharge: { Patient Condition:969608773}    Rehab Potential (if transferring to Rehab): {Prognosis:9169867461}    Recommended Labs or Other Treatments After Discharge: ***    Physician Certification: I certify the above information and transfer of Awa Voss  is necessary for the continuing treatment of the diagnosis listed and

## 2024-08-18 LAB
EKG ATRIAL RATE: 79 BPM
EKG P AXIS: -10 DEGREES
EKG P-R INTERVAL: 160 MS
EKG Q-T INTERVAL: 404 MS
EKG QRS DURATION: 68 MS
EKG QTC CALCULATION (BAZETT): 463 MS
EKG R AXIS: 2 DEGREES
EKG T AXIS: 30 DEGREES
EKG VENTRICULAR RATE: 79 BPM

## 2024-08-18 PROCEDURE — 93010 ELECTROCARDIOGRAM REPORT: CPT | Performed by: INTERNAL MEDICINE

## 2024-09-03 ENCOUNTER — OFFICE VISIT (OUTPATIENT)
Dept: ORTHOPEDIC SURGERY | Age: 44
End: 2024-09-03
Payer: COMMERCIAL

## 2024-09-03 ENCOUNTER — TELEPHONE (OUTPATIENT)
Dept: ORTHOPEDIC SURGERY | Age: 44
End: 2024-09-03

## 2024-09-03 VITALS — TEMPERATURE: 98 F | WEIGHT: 247 LBS | BODY MASS INDEX: 42.17 KG/M2 | HEIGHT: 64 IN

## 2024-09-03 DIAGNOSIS — M25.511 ACUTE PAIN OF RIGHT SHOULDER: ICD-10-CM

## 2024-09-03 DIAGNOSIS — M25.811 IMPINGEMENT OF RIGHT SHOULDER: Primary | ICD-10-CM

## 2024-09-03 DIAGNOSIS — M75.21 TENDONITIS OF LONG HEAD OF BICEPS BRACHII OF RIGHT SHOULDER: ICD-10-CM

## 2024-09-03 PROCEDURE — 99213 OFFICE O/P EST LOW 20 MIN: CPT | Performed by: ORTHOPAEDIC SURGERY

## 2024-09-03 NOTE — PROGRESS NOTES
Chief Complaint   Patient presents with    Shoulder Pain     WC right shoulder pain follow up. Shoulder continues to be painful and really giving her problems. No real changes since last visit.         Awa Voss is a 43 y.o. year old   female who is seen today for follow up of her [facility] injury. Patient insisted on appointment, surgery has not been approved.      Chief Complaint   Patient presents with    Shoulder Pain     WC right shoulder pain follow up. Shoulder continues to be painful and really giving her problems. No real changes since last visit.      Past Medical History:   Diagnosis Date    Arthritis     COVID     SOB ever since, on inhaler, improving per pt    Hypertension     during pregnancy    Iron deficiency anemia due to chronic blood loss 2023    Iron deficiency anemia due to chronic blood loss 2023    Obesity (BMI 35.0-39.9 without comorbidity) 2023    Primary hypertension 2023     Past Surgical History:   Procedure Laterality Date    ABDOMEN SURGERY       SECTION  ,,,,    DILATION AND CURETTAGE OF UTERUS N/A 2021    IUD REMOVAL (ASHLY) (CPT 17236) performed by Denis Colin MD at [facility] OR    HIATAL HERNIA REPAIR N/A 2020    LAPAROSCOPIC HIATAL HERNIA REPAIR WITH MESH, TRUNCAL VAGOTOMY performed by Patrick Sam MD at [facility] OR    LAPAROSCOPY N/A 10/24/2023    LAPAROSCOPY DIAGNOSTIC diagnostic laparoscopy with reduction and closure of internal hernia and possible revision of GJ anastomosis performed by Patrick Sam MD at [facility] OR    KATRINA-EN-Y GASTRIC BYPASS  2017    Laparascopic    UPPER GASTROINTESTINAL ENDOSCOPY  2016    Dr. Sam    UPPER GASTROINTESTINAL ENDOSCOPY N/A 10/9/2020    EGD BIOPSY performed by Patrick Sam MD at [facility] ENDOSCOPY    UPPER GASTROINTESTINAL ENDOSCOPY N/A 2023    EGD ESOPHAGOGASTRODUODENOSCOPY performed by John Gonzalez MD at [facility] ENDOSCOPY    WISDOM TOOTH EXTRACTION

## 2024-09-03 NOTE — TELEPHONE ENCOUNTER
I saw patient was in Office today for a visit.  I called her and asked her if she brought in the C-86 motion signed by her in order for us to request additional allowance. She stated she did not get anything in the mail and if I could mail it again. I went over her address and it was correct to where I mailed the form to on 08-20-24. I put in mail again today I also gave her the choice to come in and sign it in person.  Monique

## 2024-09-05 ENCOUNTER — OFFICE VISIT (OUTPATIENT)
Dept: ONCOLOGY | Age: 44
End: 2024-09-05
Payer: COMMERCIAL

## 2024-09-05 ENCOUNTER — TELEPHONE (OUTPATIENT)
Dept: INFUSION THERAPY | Age: 44
End: 2024-09-05

## 2024-09-05 ENCOUNTER — HOSPITAL ENCOUNTER (OUTPATIENT)
Dept: INFUSION THERAPY | Age: 44
Discharge: HOME | End: 2024-09-05
Payer: COMMERCIAL

## 2024-09-05 VITALS
OXYGEN SATURATION: 100 % | BODY MASS INDEX: 41.66 KG/M2 | TEMPERATURE: 98.7 F | SYSTOLIC BLOOD PRESSURE: 133 MMHG | DIASTOLIC BLOOD PRESSURE: 72 MMHG | HEIGHT: 64 IN | WEIGHT: 244 LBS | HEART RATE: 89 BPM

## 2024-09-05 DIAGNOSIS — D50.0 IRON DEFICIENCY ANEMIA DUE TO CHRONIC BLOOD LOSS: ICD-10-CM

## 2024-09-05 DIAGNOSIS — K92.2 GASTROINTESTINAL HEMORRHAGE, UNSPECIFIED GASTROINTESTINAL HEMORRHAGE TYPE: Primary | ICD-10-CM

## 2024-09-05 DIAGNOSIS — K92.2 GASTROINTESTINAL HEMORRHAGE, UNSPECIFIED GASTROINTESTINAL HEMORRHAGE TYPE: ICD-10-CM

## 2024-09-05 LAB
ABO + RH BLD: NORMAL
ALBUMIN SERPL-MCNC: 3.8 G/DL (ref 3.5–5.2)
ALP SERPL-CCNC: 118 U/L (ref 35–104)
ALT SERPL-CCNC: 15 U/L (ref 0–32)
ANION GAP SERPL CALCULATED.3IONS-SCNC: 11 MMOL/L (ref 7–16)
ARM BAND NUMBER: NORMAL
AST SERPL-CCNC: 18 U/L (ref 0–31)
BASOPHILS # BLD: 0.06 K/UL (ref 0–0.2)
BASOPHILS NFR BLD: 1 % (ref 0–2)
BILIRUB SERPL-MCNC: 0.2 MG/DL (ref 0–1.2)
BLOOD BANK SAMPLE EXPIRATION: NORMAL
BLOOD GROUP ANTIBODIES SERPL: NEGATIVE
BUN SERPL-MCNC: 10 MG/DL (ref 6–20)
CALCIUM SERPL-MCNC: 8.3 MG/DL (ref 8.6–10.2)
CHLORIDE SERPL-SCNC: 107 MMOL/L (ref 98–107)
CO2 SERPL-SCNC: 17 MMOL/L (ref 22–29)
CREAT SERPL-MCNC: 0.7 MG/DL (ref 0.5–1)
EOSINOPHIL # BLD: 0.06 K/UL (ref 0.05–0.5)
EOSINOPHILS RELATIVE PERCENT: 1 % (ref 0–6)
ERYTHROCYTE [DISTWIDTH] IN BLOOD BY AUTOMATED COUNT: 22.3 % (ref 11.5–15)
FERRITIN SERPL-MCNC: 13 NG/ML
GFR, ESTIMATED: >90 ML/MIN/1.73M2
GLUCOSE SERPL-MCNC: 78 MG/DL (ref 74–99)
HCT VFR BLD AUTO: 28.7 % (ref 34–48)
HGB BLD-MCNC: 8.2 G/DL (ref 11.5–15.5)
IMM RETICS NFR: 27.3 % (ref 3–15.9)
IRON SATN MFR SERPL: 6 % (ref 15–50)
IRON SERPL-MCNC: 25 UG/DL (ref 37–145)
LYMPHOCYTES NFR BLD: 1.54 K/UL (ref 1.5–4)
LYMPHOCYTES RELATIVE PERCENT: 24 % (ref 20–42)
MCH RBC QN AUTO: 19.2 PG (ref 26–35)
MCHC RBC AUTO-ENTMCNC: 28.6 G/DL (ref 32–34.5)
MCV RBC AUTO: 67.2 FL (ref 80–99.9)
MONOCYTES NFR BLD: 0.23 K/UL (ref 0.1–0.95)
MONOCYTES NFR BLD: 4 % (ref 2–12)
NEUTROPHILS NFR BLD: 71 % (ref 43–80)
NEUTS SEG NFR BLD: 4.62 K/UL (ref 1.8–7.3)
PLATELET # BLD AUTO: 371 K/UL (ref 130–450)
PMV BLD AUTO: 9.6 FL (ref 7–12)
POTASSIUM SERPL-SCNC: 3.7 MMOL/L (ref 3.5–5)
PROT SERPL-MCNC: 6.7 G/DL (ref 6.4–8.3)
RBC # BLD AUTO: 4.27 M/UL (ref 3.5–5.5)
RBC # BLD: ABNORMAL 10*6/UL
RETIC HEMOGLOBIN: 21 PG (ref 28.2–36.6)
RETICS # AUTO: 0.08 M/UL
RETICS/RBC NFR AUTO: 1.8 % (ref 0.4–1.9)
SODIUM SERPL-SCNC: 135 MMOL/L (ref 132–146)
TIBC SERPL-MCNC: 445 UG/DL (ref 250–450)
WBC OTHER # BLD: 6.5 K/UL (ref 4.5–11.5)

## 2024-09-05 PROCEDURE — 86901 BLOOD TYPING SEROLOGIC RH(D): CPT

## 2024-09-05 PROCEDURE — 85045 AUTOMATED RETICULOCYTE COUNT: CPT

## 2024-09-05 PROCEDURE — 99214 OFFICE O/P EST MOD 30 MIN: CPT

## 2024-09-05 PROCEDURE — 36415 COLL VENOUS BLD VENIPUNCTURE: CPT

## 2024-09-05 PROCEDURE — 85025 COMPLETE CBC W/AUTO DIFF WBC: CPT

## 2024-09-05 PROCEDURE — 80053 COMPREHEN METABOLIC PANEL: CPT

## 2024-09-05 PROCEDURE — 82728 ASSAY OF FERRITIN: CPT

## 2024-09-05 PROCEDURE — 86850 RBC ANTIBODY SCREEN: CPT

## 2024-09-05 PROCEDURE — 86900 BLOOD TYPING SEROLOGIC ABO: CPT

## 2024-09-05 PROCEDURE — 83550 IRON BINDING TEST: CPT

## 2024-09-05 PROCEDURE — 83540 ASSAY OF IRON: CPT

## 2024-09-05 RX ORDER — EPINEPHRINE 1 MG/ML
0.3 INJECTION, SOLUTION, CONCENTRATE INTRAVENOUS PRN
OUTPATIENT
Start: 2024-09-09

## 2024-09-05 RX ORDER — DIPHENHYDRAMINE HYDROCHLORIDE 50 MG/ML
50 INJECTION INTRAMUSCULAR; INTRAVENOUS
OUTPATIENT
Start: 2024-09-09

## 2024-09-05 RX ORDER — SODIUM CHLORIDE 9 MG/ML
5-250 INJECTION, SOLUTION INTRAVENOUS PRN
OUTPATIENT
Start: 2024-09-09

## 2024-09-05 RX ORDER — SODIUM CHLORIDE 9 MG/ML
INJECTION, SOLUTION INTRAVENOUS CONTINUOUS
OUTPATIENT
Start: 2024-09-09

## 2024-09-05 RX ORDER — ACETAMINOPHEN 325 MG/1
650 TABLET ORAL
OUTPATIENT
Start: 2024-09-09

## 2024-09-05 RX ORDER — HEPARIN 100 UNIT/ML
500 SYRINGE INTRAVENOUS PRN
OUTPATIENT
Start: 2024-09-09

## 2024-09-05 RX ORDER — ALBUTEROL SULFATE 90 UG/1
4 AEROSOL, METERED RESPIRATORY (INHALATION) PRN
OUTPATIENT
Start: 2024-09-09

## 2024-09-05 RX ORDER — SODIUM CHLORIDE 0.9 % (FLUSH) 0.9 %
5-40 SYRINGE (ML) INJECTION PRN
OUTPATIENT
Start: 2024-09-09

## 2024-09-05 RX ORDER — ONDANSETRON 2 MG/ML
8 INJECTION INTRAMUSCULAR; INTRAVENOUS
OUTPATIENT
Start: 2024-09-09

## 2024-09-05 NOTE — PROGRESS NOTES
Awa Voss  1980 43 y.o.      Referring Physician:     PCP: Mora Heard, DO    Vitals:    24 1122   BP: 133/72   Pulse: 89   Temp: 98.7 °F (37.1 °C)   SpO2: 100%        Wt Readings from Last 3 Encounters:   24 110.7 kg (244 lb)   24 112 kg (247 lb)   24 112 kg (247 lb)        Body mass index is 41.88 kg/m².          Chief Complaint: No chief complaint on file.         Cancer Staging   No matching staging information was found for the patient.      Prior Radiation Therapy? NO    Concurrent Chemo/radiation? NO    Prior Chemotherapy? NO    Prior Hormonal Therapy? NO    Head and Neck Cancer? No, patient does NOT have HN cancer.      LMP:     Age at first Menses: 11    : 5    Para: 4          Current Outpatient Medications:     ondansetron (ZOFRAN) 4 MG tablet, Take 1 tablet by mouth every 4 hours, Disp: 5 tablet, Rfl: 0    acetaminophen (TYLENOL) 500 MG tablet, Take 1 tablet by mouth 4 times daily as needed for Pain, Disp: 40 tablet, Rfl: 0    vitamin D (ERGOCALCIFEROL) 1.25 MG (95448 UT) CAPS capsule, TAKE 1 CAPLET ORALLY TWICE PER WEEK FOR 28 DAYS, Disp: , Rfl:     lisinopril (PRINIVIL;ZESTRIL) 10 MG tablet, Take 1 tablet by mouth at bedtime, Disp: , Rfl:     loratadine (CLARITIN) 10 MG tablet, TAKE 1 TABLET BY MOUTH EVERY DAY AS NEEDED, Disp: , Rfl:     clotrimazole-betamethasone (LOTRISONE) 1-0.05 % cream, APPLY TOPICALLY THREE TIMES DAILY, Disp: , Rfl:     albuterol sulfate  (90 Base) MCG/ACT inhaler, TAKE 1 PUFF INHALED 4 TIMES PER DAY TAKE AS NEEDED, Disp: , Rfl:     fluticasone (FLONASE) 50 MCG/ACT nasal spray, as needed, Disp: , Rfl: 6    Multiple Vitamins-Minerals (THERAPEUTIC MULTIVITAMIN-MINERALS) tablet, Take 1 tablet by mouth 2 times daily Indications: bariatric advantage, Disp: , Rfl:     omeprazole (PRILOSEC) 20 MG delayed release capsule, Take 1 capsule by mouth in the morning and 1 capsule in the evening. Do all this for 14 days., Disp:

## 2024-09-05 NOTE — PROGRESS NOTES
[facility] [address] [address]   Hematology/Oncology  Consult      Patient Name: Awa Voss  YOB: 1980  PCP: Mora Heard DO   Referring Provider:      Reason for Consultation: No chief complaint on file.       History of Present Illness:  43 years old female referred for iron deficiency anemia.  Status post 1 unit of blood transfusion last month.  Hemoglobin today 8.2 MCV 67 normal WBC platelet count.  Reticulocyte count 76,000.  Ferritin from 2024- 10.  Reports history of gastric bypass.  Denies blood in stools black tarry stools blood in urine.  Reports unremarkable GI workup in .  Reports heavy menstrual blood loss.    Review of systems: Over 10 systems were reviewed and all were negative except as mention above.    Diagnostic Data:     Past Medical History:   Diagnosis Date    Arthritis     COVID     SOB ever since, on inhaler, improving per pt    Hypertension     during pregnancy    Iron deficiency anemia due to chronic blood loss 2023    Iron deficiency anemia due to chronic blood loss 2023    Obesity (BMI 35.0-39.9 without comorbidity) 2023    Primary hypertension 2023       Patient Active Problem List    Diagnosis Date Noted    Primary hypertension 2023    Obesity (BMI 35.0-39.9 without comorbidity) 2023    Iron deficiency anemia due to chronic blood loss 2023    Rotator cuff tear, right 2024    Abdominal pain 2023    GI bleed 2023    COVID 2021    Marginal ulcer     Hiatal hernia     Gastroesophageal reflux disease without esophagitis     S/P gastric bypass 2017        Past Surgical History:   Procedure Laterality Date    ABDOMEN SURGERY       SECTION  ,,,,    DILATION AND CURETTAGE OF UTERUS N/A 2021    IUD REMOVAL (ASHLY) (CPT 11652) performed by Denis Colin MD at [facility] OR    HIATAL HERNIA REPAIR N/A 2020    LAPAROSCOPIC

## 2024-09-09 ENCOUNTER — TELEPHONE (OUTPATIENT)
Dept: BARIATRICS/WEIGHT MGMT | Age: 44
End: 2024-09-09

## 2024-09-12 ENCOUNTER — HOSPITAL ENCOUNTER (OUTPATIENT)
Dept: INFUSION THERAPY | Age: 44
Discharge: HOME | End: 2024-09-12
Payer: COMMERCIAL

## 2024-09-12 VITALS
TEMPERATURE: 97.3 F | DIASTOLIC BLOOD PRESSURE: 54 MMHG | HEART RATE: 68 BPM | RESPIRATION RATE: 16 BRPM | SYSTOLIC BLOOD PRESSURE: 142 MMHG | OXYGEN SATURATION: 100 %

## 2024-09-12 DIAGNOSIS — D50.0 IRON DEFICIENCY ANEMIA DUE TO CHRONIC BLOOD LOSS: Primary | ICD-10-CM

## 2024-09-12 PROCEDURE — 6360000002 HC RX W HCPCS: Performed by: INTERNAL MEDICINE

## 2024-09-12 PROCEDURE — 96374 THER/PROPH/DIAG INJ IV PUSH: CPT

## 2024-09-12 RX ORDER — ACETAMINOPHEN 325 MG/1
650 TABLET ORAL
OUTPATIENT
Start: 2024-09-19

## 2024-09-12 RX ORDER — SODIUM CHLORIDE 9 MG/ML
INJECTION, SOLUTION INTRAVENOUS CONTINUOUS
OUTPATIENT
Start: 2024-09-19

## 2024-09-12 RX ORDER — DIPHENHYDRAMINE HYDROCHLORIDE 50 MG/ML
50 INJECTION INTRAMUSCULAR; INTRAVENOUS
OUTPATIENT
Start: 2024-09-19

## 2024-09-12 RX ORDER — SODIUM CHLORIDE 9 MG/ML
5-250 INJECTION, SOLUTION INTRAVENOUS PRN
OUTPATIENT
Start: 2024-09-19

## 2024-09-12 RX ORDER — SODIUM CHLORIDE 9 MG/ML
5-250 INJECTION, SOLUTION INTRAVENOUS PRN
Status: DISCONTINUED | OUTPATIENT
Start: 2024-09-12 | End: 2024-09-13 | Stop reason: HOSPADM

## 2024-09-12 RX ORDER — SODIUM CHLORIDE 0.9 % (FLUSH) 0.9 %
5-40 SYRINGE (ML) INJECTION PRN
OUTPATIENT
Start: 2024-09-19

## 2024-09-12 RX ORDER — ONDANSETRON 2 MG/ML
8 INJECTION INTRAMUSCULAR; INTRAVENOUS
OUTPATIENT
Start: 2024-09-19

## 2024-09-12 RX ORDER — EPINEPHRINE 1 MG/ML
0.3 INJECTION, SOLUTION, CONCENTRATE INTRAVENOUS PRN
OUTPATIENT
Start: 2024-09-19

## 2024-09-12 RX ORDER — HEPARIN 100 UNIT/ML
500 SYRINGE INTRAVENOUS PRN
OUTPATIENT
Start: 2024-09-19

## 2024-09-12 RX ORDER — ALBUTEROL SULFATE 90 UG/1
4 INHALANT RESPIRATORY (INHALATION) PRN
OUTPATIENT
Start: 2024-09-19

## 2024-09-12 RX ORDER — FAMOTIDINE 10 MG/ML
20 INJECTION, SOLUTION INTRAVENOUS
OUTPATIENT
Start: 2024-09-19

## 2024-09-12 RX ADMIN — IRON SUCROSE 200 MG: 20 INJECTION, SOLUTION INTRAVENOUS at 09:44

## 2024-09-17 ENCOUNTER — TELEPHONE (OUTPATIENT)
Dept: ORTHOPEDIC SURGERY | Age: 44
End: 2024-09-17

## 2024-09-26 ENCOUNTER — HOSPITAL ENCOUNTER (OUTPATIENT)
Dept: INFUSION THERAPY | Age: 44
Discharge: HOME | End: 2024-09-26
Payer: COMMERCIAL

## 2024-09-26 VITALS
RESPIRATION RATE: 16 BRPM | DIASTOLIC BLOOD PRESSURE: 71 MMHG | HEART RATE: 94 BPM | SYSTOLIC BLOOD PRESSURE: 128 MMHG | OXYGEN SATURATION: 100 % | TEMPERATURE: 97.7 F

## 2024-09-26 DIAGNOSIS — D50.0 IRON DEFICIENCY ANEMIA DUE TO CHRONIC BLOOD LOSS: Primary | ICD-10-CM

## 2024-09-26 PROCEDURE — 2580000003 HC RX 258: Performed by: INTERNAL MEDICINE

## 2024-09-26 PROCEDURE — 96374 THER/PROPH/DIAG INJ IV PUSH: CPT

## 2024-09-26 PROCEDURE — 6360000002 HC RX W HCPCS: Performed by: INTERNAL MEDICINE

## 2024-09-26 RX ORDER — SODIUM CHLORIDE 0.9 % (FLUSH) 0.9 %
5-40 SYRINGE (ML) INJECTION PRN
Status: DISCONTINUED | OUTPATIENT
Start: 2024-09-26 | End: 2024-09-27 | Stop reason: HOSPADM

## 2024-09-26 RX ORDER — SODIUM CHLORIDE 9 MG/ML
5-250 INJECTION, SOLUTION INTRAVENOUS PRN
OUTPATIENT
Start: 2024-10-03

## 2024-09-26 RX ORDER — SODIUM CHLORIDE 9 MG/ML
INJECTION, SOLUTION INTRAVENOUS CONTINUOUS
OUTPATIENT
Start: 2024-10-03

## 2024-09-26 RX ORDER — SODIUM CHLORIDE 0.9 % (FLUSH) 0.9 %
5-40 SYRINGE (ML) INJECTION PRN
OUTPATIENT
Start: 2024-10-03

## 2024-09-26 RX ORDER — ALBUTEROL SULFATE 90 UG/1
4 INHALANT RESPIRATORY (INHALATION) PRN
OUTPATIENT
Start: 2024-10-03

## 2024-09-26 RX ORDER — DIPHENHYDRAMINE HYDROCHLORIDE 50 MG/ML
50 INJECTION INTRAMUSCULAR; INTRAVENOUS
OUTPATIENT
Start: 2024-10-03

## 2024-09-26 RX ORDER — HEPARIN 100 UNIT/ML
500 SYRINGE INTRAVENOUS PRN
OUTPATIENT
Start: 2024-10-03

## 2024-09-26 RX ORDER — ONDANSETRON 2 MG/ML
8 INJECTION INTRAMUSCULAR; INTRAVENOUS
OUTPATIENT
Start: 2024-10-03

## 2024-09-26 RX ORDER — ACETAMINOPHEN 325 MG/1
650 TABLET ORAL
OUTPATIENT
Start: 2024-10-03

## 2024-09-26 RX ORDER — SODIUM CHLORIDE 9 MG/ML
5-250 INJECTION, SOLUTION INTRAVENOUS PRN
Status: DISCONTINUED | OUTPATIENT
Start: 2024-09-26 | End: 2024-09-27 | Stop reason: HOSPADM

## 2024-09-26 RX ORDER — EPINEPHRINE 1 MG/ML
0.3 INJECTION, SOLUTION, CONCENTRATE INTRAVENOUS PRN
OUTPATIENT
Start: 2024-10-03

## 2024-09-26 RX ORDER — FAMOTIDINE 10 MG/ML
20 INJECTION, SOLUTION INTRAVENOUS
OUTPATIENT
Start: 2024-10-03

## 2024-09-26 RX ADMIN — IRON SUCROSE 200 MG: 20 INJECTION, SOLUTION INTRAVENOUS at 09:46

## 2024-09-26 RX ADMIN — SODIUM CHLORIDE 20 ML/HR: 9 INJECTION, SOLUTION INTRAVENOUS at 09:44

## 2024-09-28 ENCOUNTER — HOSPITAL ENCOUNTER (EMERGENCY)
Age: 44
Discharge: HOME | End: 2024-09-28
Attending: STUDENT IN AN ORGANIZED HEALTH CARE EDUCATION/TRAINING PROGRAM
Payer: COMMERCIAL

## 2024-09-28 ENCOUNTER — APPOINTMENT (OUTPATIENT)
Dept: GENERAL RADIOLOGY | Age: 44
End: 2024-09-28
Payer: COMMERCIAL

## 2024-09-28 ENCOUNTER — APPOINTMENT (OUTPATIENT)
Dept: CT IMAGING | Age: 44
End: 2024-09-28
Payer: COMMERCIAL

## 2024-09-28 VITALS
SYSTOLIC BLOOD PRESSURE: 151 MMHG | OXYGEN SATURATION: 95 % | WEIGHT: 244 LBS | HEIGHT: 64 IN | BODY MASS INDEX: 41.66 KG/M2 | RESPIRATION RATE: 16 BRPM | HEART RATE: 85 BPM | TEMPERATURE: 97.8 F | DIASTOLIC BLOOD PRESSURE: 94 MMHG
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
## 2024-09-28 DIAGNOSIS — S60.221A CONTUSION OF DORSUM OF RIGHT HAND: ICD-10-CM

## 2024-09-28 PROCEDURE — 72128 CT CHEST SPINE W/O DYE: CPT

## 2024-09-28 PROCEDURE — 70450 CT HEAD/BRAIN W/O DYE: CPT

## 2024-09-28 PROCEDURE — 73030 X-RAY EXAM OF SHOULDER: CPT

## 2024-09-28 PROCEDURE — 73110 X-RAY EXAM OF WRIST: CPT

## 2024-09-28 PROCEDURE — 99284 EMERGENCY DEPT VISIT MOD MDM: CPT

## 2024-09-28 PROCEDURE — 72125 CT NECK SPINE W/O DYE: CPT

## 2024-09-28 PROCEDURE — 73130 X-RAY EXAM OF HAND: CPT

## 2024-09-28 PROCEDURE — 71045 X-RAY EXAM CHEST 1 VIEW: CPT

## 2024-09-28 ASSESSMENT — ENCOUNTER SYMPTOMS
VOICE CHANGE: 0
DIARRHEA: 0
PHOTOPHOBIA: 0
SHORTNESS OF BREATH: 0
WHEEZING: 0
NAUSEA: 0
TROUBLE SWALLOWING: 0
VOMITING: 0
SORE THROAT: 0
COUGH: 0
ABDOMINAL PAIN: 0

## 2024-09-28 NOTE — DISCHARGE INSTRUCTIONS
Use ice at home.  Follow-up with family doctor.  Return for any significant worsening symptoms or other acute symptoms or concerns

## 2024-10-03 ENCOUNTER — TELEPHONE (OUTPATIENT)
Dept: ORTHOPEDIC SURGERY | Age: 44
End: 2024-10-03

## 2024-10-03 ENCOUNTER — HOSPITAL ENCOUNTER (OUTPATIENT)
Dept: INFUSION THERAPY | Age: 44
Discharge: HOME | End: 2024-10-03
Payer: COMMERCIAL

## 2024-10-03 VITALS
TEMPERATURE: 97.4 F | DIASTOLIC BLOOD PRESSURE: 80 MMHG | RESPIRATION RATE: 16 BRPM | HEART RATE: 93 BPM | SYSTOLIC BLOOD PRESSURE: 155 MMHG | OXYGEN SATURATION: 100 %

## 2024-10-03 DIAGNOSIS — D50.0 IRON DEFICIENCY ANEMIA DUE TO CHRONIC BLOOD LOSS: Primary | ICD-10-CM

## 2024-10-03 LAB
BASOPHILS # BLD: 0 K/UL (ref 0–0.2)
BASOPHILS NFR BLD: 0 % (ref 0–2)
EOSINOPHIL # BLD: 0.08 K/UL (ref 0.05–0.5)
EOSINOPHILS RELATIVE PERCENT: 2 % (ref 0–6)
ERYTHROCYTE [DISTWIDTH] IN BLOOD BY AUTOMATED COUNT: 26.5 % (ref 11.5–15)
HCT VFR BLD AUTO: 29.5 % (ref 34–48)
HGB BLD-MCNC: 8.9 G/DL (ref 11.5–15.5)
LYMPHOCYTES NFR BLD: 1.86 K/UL (ref 1.5–4)
LYMPHOCYTES RELATIVE PERCENT: 42 % (ref 20–42)
MCH RBC QN AUTO: 21.8 PG (ref 26–35)
MCHC RBC AUTO-ENTMCNC: 30.2 G/DL (ref 32–34.5)
MCV RBC AUTO: 72.3 FL (ref 80–99.9)
MONOCYTES NFR BLD: 0.24 K/UL (ref 0.1–0.95)
MONOCYTES NFR BLD: 5 % (ref 2–12)
NEUTROPHILS NFR BLD: 51 % (ref 43–80)
NEUTS SEG NFR BLD: 2.22 K/UL (ref 1.8–7.3)
PLATELET # BLD AUTO: 281 K/UL (ref 130–450)
PMV BLD AUTO: 9 FL (ref 7–12)
RBC # BLD AUTO: 4.08 M/UL (ref 3.5–5.5)
RBC # BLD: ABNORMAL 10*6/UL
WBC OTHER # BLD: 4.4 K/UL (ref 4.5–11.5)

## 2024-10-03 PROCEDURE — 85025 COMPLETE CBC W/AUTO DIFF WBC: CPT

## 2024-10-03 NOTE — PROGRESS NOTES
Unable to obtain IV access on patient after multiple attempts. Patient prefers to come back next week for next infusion. Additional iron will be added on end of schedule to make up for not receiving iron today. VSS. Patient discharged

## 2024-10-10 ENCOUNTER — HOSPITAL ENCOUNTER (OUTPATIENT)
Dept: INFUSION THERAPY | Age: 44
Discharge: HOME | End: 2024-10-10
Payer: COMMERCIAL

## 2024-10-10 VITALS
SYSTOLIC BLOOD PRESSURE: 151 MMHG | DIASTOLIC BLOOD PRESSURE: 69 MMHG | RESPIRATION RATE: 18 BRPM | OXYGEN SATURATION: 100 % | HEART RATE: 85 BPM | TEMPERATURE: 97.9 F

## 2024-10-10 DIAGNOSIS — D50.0 IRON DEFICIENCY ANEMIA DUE TO CHRONIC BLOOD LOSS: Primary | ICD-10-CM

## 2024-10-10 PROCEDURE — 2580000003 HC RX 258: Performed by: INTERNAL MEDICINE

## 2024-10-10 PROCEDURE — 6360000002 HC RX W HCPCS: Performed by: INTERNAL MEDICINE

## 2024-10-10 PROCEDURE — 96374 THER/PROPH/DIAG INJ IV PUSH: CPT

## 2024-10-10 RX ORDER — FAMOTIDINE 10 MG/ML
20 INJECTION, SOLUTION INTRAVENOUS
Status: CANCELLED | OUTPATIENT
Start: 2024-10-17

## 2024-10-10 RX ORDER — ALBUTEROL SULFATE 90 UG/1
4 INHALANT RESPIRATORY (INHALATION) PRN
Status: CANCELLED | OUTPATIENT
Start: 2024-10-17

## 2024-10-10 RX ORDER — SODIUM CHLORIDE 0.9 % (FLUSH) 0.9 %
5-40 SYRINGE (ML) INJECTION PRN
Status: DISCONTINUED | OUTPATIENT
Start: 2024-10-10 | End: 2024-10-11 | Stop reason: HOSPADM

## 2024-10-10 RX ORDER — ACETAMINOPHEN 325 MG/1
650 TABLET ORAL
Status: CANCELLED | OUTPATIENT
Start: 2024-10-17

## 2024-10-10 RX ORDER — HEPARIN 100 UNIT/ML
500 SYRINGE INTRAVENOUS PRN
Status: CANCELLED | OUTPATIENT
Start: 2024-10-17

## 2024-10-10 RX ORDER — SODIUM CHLORIDE 9 MG/ML
5-250 INJECTION, SOLUTION INTRAVENOUS PRN
Status: CANCELLED | OUTPATIENT
Start: 2024-10-17

## 2024-10-10 RX ORDER — EPINEPHRINE 1 MG/ML
0.3 INJECTION, SOLUTION, CONCENTRATE INTRAVENOUS PRN
Status: CANCELLED | OUTPATIENT
Start: 2024-10-17

## 2024-10-10 RX ORDER — SODIUM CHLORIDE 9 MG/ML
INJECTION, SOLUTION INTRAVENOUS CONTINUOUS
Status: CANCELLED | OUTPATIENT
Start: 2024-10-17

## 2024-10-10 RX ORDER — ONDANSETRON 2 MG/ML
8 INJECTION INTRAMUSCULAR; INTRAVENOUS
Status: CANCELLED | OUTPATIENT
Start: 2024-10-17

## 2024-10-10 RX ORDER — DIPHENHYDRAMINE HYDROCHLORIDE 50 MG/ML
50 INJECTION INTRAMUSCULAR; INTRAVENOUS
Status: CANCELLED | OUTPATIENT
Start: 2024-10-17

## 2024-10-10 RX ORDER — SODIUM CHLORIDE 0.9 % (FLUSH) 0.9 %
5-40 SYRINGE (ML) INJECTION PRN
Status: CANCELLED | OUTPATIENT
Start: 2024-10-17

## 2024-10-10 RX ORDER — SODIUM CHLORIDE 9 MG/ML
5-250 INJECTION, SOLUTION INTRAVENOUS PRN
Status: DISCONTINUED | OUTPATIENT
Start: 2024-10-10 | End: 2024-10-11 | Stop reason: HOSPADM

## 2024-10-10 RX ADMIN — SODIUM CHLORIDE 30 ML/HR: 9 INJECTION, SOLUTION INTRAVENOUS at 10:14

## 2024-10-10 RX ADMIN — IRON SUCROSE 200 MG: 20 INJECTION, SOLUTION INTRAVENOUS at 10:14

## 2024-10-10 RX ADMIN — Medication 10 ML: at 10:10

## 2024-10-10 NOTE — PROGRESS NOTES
Patient tolerated infusion well. Monitored x 30 minutes after no problems. . IV removed Vitals stable. Patient started complaining of mild itchiness to legs after IV removed. and this happened with previous venofer. She took oral benadryl as home and it subsided. She refused IV benadryl because she has to go to work and does not want to be too sleepy. Margarita Jane and Darcy Ramsey notified. They instructed me to tell th patient to talk oral benadryl asap (she has it) and to call us if symptoms worsen. Ok to discharged. Vitas stable. Patient wants to get to work asap. Discharged

## 2024-10-14 NOTE — PROGRESS NOTES
"SUBJECTIVE:  Jerrica Jensen is a 9 y.o. female here accompanied by mother for Med Check    HPI     Current medication(s): Adderall XR 10mg  Takes Medication: daily  Currently in: school  Attends: in person classes  School performance/Behavior:  having issues with focusing and staying on task. She is getting marks and notes for needing re-direction often now.  Appetite: somewhat decreased while on medications but overall ok  Sleep:no problems  Side effects: none    Review of Systems   Psychiatric/Behavioral:  Positive for decreased concentration.       A comprehensive review of symptoms was completed and negative except as noted above.    OBJECTIVE:  Vital signs  Vitals:    10/14/24 1458   BP: 110/64   Pulse: 81   Weight: 33.7 kg (74 lb 4.7 oz)   Height: 4' 4.76" (1.34 m)        Physical Exam  Vitals and nursing note reviewed.   Constitutional:       General: She is active.      Appearance: Normal appearance.   Cardiovascular:      Rate and Rhythm: Normal rate and regular rhythm.      Pulses: Normal pulses.      Heart sounds: Normal heart sounds.   Pulmonary:      Breath sounds: Normal breath sounds.   Neurological:      General: No focal deficit present.      Mental Status: She is alert.   Psychiatric:         Mood and Affect: Mood normal.         Behavior: Behavior normal.          ASSESSMENT/PLAN:  Jerrica was seen today for med check.    Diagnoses and all orders for this visit:    ADHD (attention deficit hyperactivity disorder), combined type  -     dextroamphetamine-amphetamine (ADDERALL XR) 15 MG 24 hr capsule; Take 1 capsule (15 mg total) by mouth once daily.         Growth and development were reviewed/discussed and are within acceptable ranges for age.  Will trial increase to Adderall XR 15mg  Discussed daily dosing   Will follow-up in 1 month on new dosing for update       Follow Up:  Follow up in about 1 month (around 11/14/2024).    Time Based Documentation : I spent a total of 30 minutes face to face and " [facility]  Physical Therapy Daily Treatment Note  Date: 2024  Patient Name: Awa Voss  : 1980   MRN: [id number]  DOInjury: 2024  DOSx: na   Referring Provider: Henrik Albarran DO  [address] [place] [address]     Medical Diagnosis:  s46.911a shoulder strain, s16.1xxa cerv strain     Outcome Measure:NDI= 34, 68%    S: Patient reports constant pain 6/10 right shoulder. Pain is described as manageable.  Injection scheduled for 24.  O:    Time 5570-6527 3x/wk, 4-6 weeks C9  thru 24   +12 thru 24   Visit 17/ C9  ended   thru 24    Pain 6/10    ROM Limited right shoulder flex and cerv SB L    Modalities     MH & ES to C/S and shoulders ES: across neck into R shldr   THEREX     UBE L4 x 2 min FWD  L4 x 2 min Retro    Pulleys shoulder flex X 3 min     Shrugs     Shld flex 1# DB x 20    Shldr abd 1# DB x 20    Overhead press 1# DB x 20    Wand IR stretch X 2 min    Supine ER stretch hands behind head 6 x 10 sec     Pull downs Green x 20    ROWS: M Green x 20    Punch regular  Green x 20 Right    ROWS: L Green x 20    ER Green 2 x 10 right    IR Green 2 x 10 right    Functional activities To aid in ROM and strength needed for reaching, lifting, pushing and pulling at home/work    Diag down Green x 20 each     Diag up Red x 20 D2  Green x 20 D1              A:  Patient tolerated well. Some difficulty with overhead press.  P: Continue with rehab plan.  Henrik Chavira, PT    Treatment Charges: TIMED Mins Time in Time out Units    78960 Initial Evaluation        50163 Ther Exercise         TE  23 0935 0958 2   79876 Manual Therapy     MT       33944 Ther Activities        TA 4 0958 1002 0   45867 Gait Training          GT       60306 Neuro Re-education NR       Non-Billable Service Time    0   Total Time/Timed Units 27 0935 1002 2       TREATMENT CHARGES : UNTIMED UNITS   44552 E- stim    06915 traction    70209     Total UNTIMED Units            non-face to face on the date of this visit.This includes time preparing to see the patient (eg, review of tests, notes), obtaining and/or reviewing additional history from an independent historian and/or outside medical records, documenting clinical information in the electronic health record, independently interpreting results and/or communicating results to the patient/family/caregiver, or care coordinator.

## 2024-10-17 ENCOUNTER — HOSPITAL ENCOUNTER (OUTPATIENT)
Dept: INFUSION THERAPY | Age: 44
Discharge: HOME | End: 2024-10-17
Payer: COMMERCIAL

## 2024-10-17 VITALS
TEMPERATURE: 98.5 F | OXYGEN SATURATION: 100 % | DIASTOLIC BLOOD PRESSURE: 67 MMHG | RESPIRATION RATE: 20 BRPM | SYSTOLIC BLOOD PRESSURE: 142 MMHG | HEART RATE: 72 BPM

## 2024-10-17 DIAGNOSIS — D50.0 IRON DEFICIENCY ANEMIA DUE TO CHRONIC BLOOD LOSS: Primary | ICD-10-CM

## 2024-10-17 PROCEDURE — 2580000003 HC RX 258: Performed by: INTERNAL MEDICINE

## 2024-10-17 PROCEDURE — 96374 THER/PROPH/DIAG INJ IV PUSH: CPT

## 2024-10-17 PROCEDURE — 6360000002 HC RX W HCPCS: Performed by: INTERNAL MEDICINE

## 2024-10-17 RX ORDER — SODIUM CHLORIDE 9 MG/ML
5-250 INJECTION, SOLUTION INTRAVENOUS PRN
OUTPATIENT
Start: 2024-10-24

## 2024-10-17 RX ORDER — SODIUM CHLORIDE 0.9 % (FLUSH) 0.9 %
5-40 SYRINGE (ML) INJECTION PRN
OUTPATIENT
Start: 2024-10-24

## 2024-10-17 RX ORDER — ALBUTEROL SULFATE 90 UG/1
4 INHALANT RESPIRATORY (INHALATION) PRN
OUTPATIENT
Start: 2024-10-24

## 2024-10-17 RX ORDER — ACETAMINOPHEN 325 MG/1
650 TABLET ORAL
OUTPATIENT
Start: 2024-10-24

## 2024-10-17 RX ORDER — SODIUM CHLORIDE 9 MG/ML
INJECTION, SOLUTION INTRAVENOUS CONTINUOUS
OUTPATIENT
Start: 2024-10-24

## 2024-10-17 RX ORDER — EPINEPHRINE 1 MG/ML
0.3 INJECTION, SOLUTION, CONCENTRATE INTRAVENOUS PRN
OUTPATIENT
Start: 2024-10-24

## 2024-10-17 RX ORDER — SODIUM CHLORIDE 0.9 % (FLUSH) 0.9 %
5-40 SYRINGE (ML) INJECTION PRN
Status: DISCONTINUED | OUTPATIENT
Start: 2024-10-17 | End: 2024-10-18 | Stop reason: HOSPADM

## 2024-10-17 RX ORDER — DIPHENHYDRAMINE HYDROCHLORIDE 50 MG/ML
50 INJECTION INTRAMUSCULAR; INTRAVENOUS
OUTPATIENT
Start: 2024-10-24

## 2024-10-17 RX ORDER — FAMOTIDINE 10 MG/ML
20 INJECTION, SOLUTION INTRAVENOUS
OUTPATIENT
Start: 2024-10-24

## 2024-10-17 RX ORDER — HEPARIN 100 UNIT/ML
500 SYRINGE INTRAVENOUS PRN
OUTPATIENT
Start: 2024-10-24

## 2024-10-17 RX ORDER — ONDANSETRON 2 MG/ML
8 INJECTION INTRAMUSCULAR; INTRAVENOUS
OUTPATIENT
Start: 2024-10-24

## 2024-10-17 RX ADMIN — SODIUM CHLORIDE, PRESERVATIVE FREE 10 ML: 5 INJECTION INTRAVENOUS at 09:50

## 2024-10-17 RX ADMIN — IRON SUCROSE 200 MG: 20 INJECTION, SOLUTION INTRAVENOUS at 09:44

## 2024-10-17 RX ADMIN — SODIUM CHLORIDE, PRESERVATIVE FREE 10 ML: 5 INJECTION INTRAVENOUS at 09:33

## 2024-10-30 ENCOUNTER — HOSPITAL ENCOUNTER (OUTPATIENT)
Dept: MAMMOGRAPHY | Age: 44
Discharge: HOME | End: 2024-11-01
Attending: OBSTETRICS & GYNECOLOGY
Payer: COMMERCIAL

## 2024-10-30 ENCOUNTER — HOSPITAL ENCOUNTER (OUTPATIENT)
Dept: ULTRASOUND IMAGING | Age: 44
Discharge: HOME | End: 2024-10-30
Attending: OBSTETRICS & GYNECOLOGY
Payer: COMMERCIAL

## 2024-10-30 DIAGNOSIS — N64.89 BREAST ASYMMETRY: ICD-10-CM

## 2024-10-30 DIAGNOSIS — N63.13 LUMP IN LOWER OUTER QUADRANT OF RIGHT BREAST: ICD-10-CM

## 2024-10-30 PROCEDURE — G0279 TOMOSYNTHESIS, MAMMO: HCPCS

## 2024-10-30 PROCEDURE — 76642 ULTRASOUND BREAST LIMITED: CPT

## 2024-10-31 ENCOUNTER — TELEPHONE (OUTPATIENT)
Dept: BARIATRICS/WEIGHT MGMT | Age: 44
End: 2024-10-31

## 2024-12-30 ENCOUNTER — HOSPITAL ENCOUNTER (EMERGENCY)
Age: 44
Discharge: HOME | End: 2024-12-30
Attending: EMERGENCY MEDICINE
Payer: COMMERCIAL

## 2024-12-30 VITALS
HEART RATE: 84 BPM | DIASTOLIC BLOOD PRESSURE: 74 MMHG | SYSTOLIC BLOOD PRESSURE: 137 MMHG | OXYGEN SATURATION: 97 % | RESPIRATION RATE: 16 BRPM | TEMPERATURE: 98.1 F

## 2024-12-30 DIAGNOSIS — J06.9 ACUTE UPPER RESPIRATORY INFECTION: Primary | ICD-10-CM

## 2024-12-30 DIAGNOSIS — J45.901 EXACERBATION OF ASTHMA, UNSPECIFIED ASTHMA SEVERITY, UNSPECIFIED WHETHER PERSISTENT: ICD-10-CM

## 2024-12-30 PROCEDURE — 96372 THER/PROPH/DIAG INJ SC/IM: CPT

## 2024-12-30 PROCEDURE — 99284 EMERGENCY DEPT VISIT MOD MDM: CPT

## 2024-12-30 PROCEDURE — 6360000002 HC RX W HCPCS: Performed by: EMERGENCY MEDICINE

## 2024-12-30 RX ORDER — ALBUTEROL SULFATE 90 UG/1
2 INHALANT RESPIRATORY (INHALATION) EVERY 6 HOURS PRN
Qty: 18 G | Refills: 0 | Status: SHIPPED | OUTPATIENT
Start: 2024-12-30

## 2024-12-30 RX ORDER — FLUCONAZOLE 150 MG/1
150 TABLET ORAL ONCE
Qty: 1 TABLET | Refills: 0 | Status: SHIPPED | OUTPATIENT
Start: 2024-12-30 | End: 2024-12-30

## 2024-12-30 RX ORDER — BROMPHENIRAMINE MALEATE, PSEUDOEPHEDRINE HYDROCHLORIDE, AND DEXTROMETHORPHAN HYDROBROMIDE 2; 30; 10 MG/5ML; MG/5ML; MG/5ML
5 SYRUP ORAL 4 TIMES DAILY PRN
Qty: 120 ML | Refills: 0 | Status: SHIPPED | OUTPATIENT
Start: 2024-12-30

## 2024-12-30 RX ORDER — METHYLPREDNISOLONE 4 MG/1
TABLET ORAL
Qty: 1 KIT | Refills: 0 | Status: SHIPPED | OUTPATIENT
Start: 2024-12-30 | End: 2025-01-05

## 2024-12-30 RX ORDER — DEXAMETHASONE SODIUM PHOSPHATE 10 MG/ML
10 INJECTION, SOLUTION INTRAMUSCULAR; INTRAVENOUS ONCE
Status: COMPLETED | OUTPATIENT
Start: 2024-12-30 | End: 2024-12-30

## 2024-12-30 RX ADMIN — DEXAMETHASONE SODIUM PHOSPHATE 10 MG: 10 INJECTION INTRAMUSCULAR; INTRAVENOUS at 09:24

## 2024-12-30 ASSESSMENT — PAIN - FUNCTIONAL ASSESSMENT
PAIN_FUNCTIONAL_ASSESSMENT: 0-10
PAIN_FUNCTIONAL_ASSESSMENT: NONE - DENIES PAIN

## 2024-12-30 ASSESSMENT — PAIN DESCRIPTION - DESCRIPTORS: DESCRIPTORS: ACHING;SORE

## 2024-12-30 ASSESSMENT — PAIN DESCRIPTION - LOCATION: LOCATION: GENERALIZED

## 2024-12-30 ASSESSMENT — ENCOUNTER SYMPTOMS
ABDOMINAL DISTENTION: 0
WHEEZING: 1
SINUS PRESSURE: 0
NAUSEA: 0
BACK PAIN: 0
EYE REDNESS: 0
VOMITING: 0
COUGH: 1
EYE PAIN: 0
DIARRHEA: 0
RHINORRHEA: 1
SHORTNESS OF BREATH: 0
SORE THROAT: 0
EYE DISCHARGE: 0

## 2024-12-30 ASSESSMENT — PAIN DESCRIPTION - ONSET: ONSET: GRADUAL

## 2024-12-30 ASSESSMENT — PAIN DESCRIPTION - PAIN TYPE: TYPE: ACUTE PAIN

## 2024-12-30 NOTE — ED PROVIDER NOTES
guarding or rebound.   Musculoskeletal:      Cervical back: Normal range of motion and neck supple.   Skin:     General: Skin is warm and dry.   Neurological:      Mental Status: She is alert and oriented to person, place, and time.      Cranial Nerves: No cranial nerve deficit.      Coordination: Coordination normal.          Procedures     MDM          --------------------------------------------- PAST HISTORY ---------------------------------------------  Past Medical History:  has a past medical history of Arthritis, COVID, Hypertension, Iron deficiency anemia due to chronic blood loss, Iron deficiency anemia due to chronic blood loss, Obesity (BMI 35.0-39.9 without comorbidity), and Primary hypertension.    Past Surgical History:  has a past surgical history that includes Abdomen surgery; [place] tooth extraction;  section (,,,,); Upper gastrointestinal endoscopy (2016); Shannon-en-Y Gastric Bypass (2017); Upper gastrointestinal endoscopy (N/A, 10/9/2020); hiatal hernia repair (N/A, 2020); Dilation and curettage of uterus (N/A, 2021); Upper gastrointestinal endoscopy (N/A, 2023); and laparoscopy (N/A, 10/24/2023).    Social History:  reports that she quit smoking about 13 years ago. Her smoking use included cigarettes. She has never used smokeless tobacco. She reports that she does not drink alcohol and does not use drugs.    Family History: family history includes Hearing Loss in her paternal cousin; Heart Disease in her mother; No Known Problems in her brother.     The patient’s home medications have been reviewed.    Allergies: Ibuprofen    -------------------------------------------------- RESULTS -------------------------------------------------  Labs:  No results found for this visit on 24.    Radiology:  No orders to display       ------------------------- NURSING NOTES AND VITALS REVIEWED ---------------------------  Date / Time Roomed:

## 2025-01-11 ENCOUNTER — HOSPITAL ENCOUNTER (EMERGENCY)
Age: 45
Discharge: HOME | End: 2025-01-11
Attending: EMERGENCY MEDICINE
Payer: COMMERCIAL

## 2025-01-11 VITALS
HEART RATE: 98 BPM | TEMPERATURE: 99.1 F | SYSTOLIC BLOOD PRESSURE: 148 MMHG | DIASTOLIC BLOOD PRESSURE: 92 MMHG | OXYGEN SATURATION: 98 % | RESPIRATION RATE: 16 BRPM

## 2025-01-11 DIAGNOSIS — J06.9 ACUTE UPPER RESPIRATORY INFECTION: Primary | ICD-10-CM

## 2025-01-11 DIAGNOSIS — J45.909 UNCOMPLICATED ASTHMA, UNSPECIFIED ASTHMA SEVERITY, UNSPECIFIED WHETHER PERSISTENT: ICD-10-CM

## 2025-01-11 PROCEDURE — 99283 EMERGENCY DEPT VISIT LOW MDM: CPT

## 2025-01-11 RX ORDER — BROMPHENIRAMINE MALEATE, PSEUDOEPHEDRINE HYDROCHLORIDE, AND DEXTROMETHORPHAN HYDROBROMIDE 2; 30; 10 MG/5ML; MG/5ML; MG/5ML
5 SYRUP ORAL 4 TIMES DAILY PRN
Qty: 120 ML | Refills: 0 | Status: SHIPPED | OUTPATIENT
Start: 2025-01-11

## 2025-01-11 RX ORDER — PREDNISONE 20 MG/1
40 TABLET ORAL DAILY
Qty: 20 TABLET | Refills: 0 | Status: SHIPPED | OUTPATIENT
Start: 2025-01-11 | End: 2025-01-21

## 2025-01-11 RX ORDER — DOXYCYCLINE HYCLATE 100 MG
100 TABLET ORAL 2 TIMES DAILY
Qty: 20 TABLET | Refills: 0 | Status: SHIPPED | OUTPATIENT
Start: 2025-01-11 | End: 2025-01-21

## 2025-01-11 ASSESSMENT — ENCOUNTER SYMPTOMS
DIARRHEA: 0
SINUS PRESSURE: 0
RHINORRHEA: 1
VOMITING: 0
EYE PAIN: 0
EYE REDNESS: 0
ABDOMINAL DISTENTION: 0
WHEEZING: 0
SHORTNESS OF BREATH: 0
EYE DISCHARGE: 0
SORE THROAT: 0
BACK PAIN: 0
NAUSEA: 0
COUGH: 1

## 2025-01-11 ASSESSMENT — PAIN - FUNCTIONAL ASSESSMENT: PAIN_FUNCTIONAL_ASSESSMENT: NONE - DENIES PAIN

## 2025-01-11 NOTE — ED PROVIDER NOTES
SEEN on 12/30/2024, NEVER FOLLOWED UP WITH PCP    The history is provided by the patient.   Cold Symptoms  Presenting symptoms: congestion, cough, fatigue and rhinorrhea    Presenting symptoms: no ear pain, no fever and no sore throat    Severity:  Moderate  Onset quality:  Gradual  Duration:  2 weeks  Chronicity:  Recurrent  Associated symptoms: no arthralgias, no headaches and no wheezing         Review of Systems   Constitutional:  Positive for fatigue. Negative for chills and fever.   HENT:  Positive for congestion and rhinorrhea. Negative for ear pain, sinus pressure and sore throat.    Eyes:  Negative for pain, discharge and redness.   Respiratory:  Positive for cough. Negative for shortness of breath and wheezing.    Cardiovascular:  Negative for chest pain.   Gastrointestinal:  Negative for abdominal distention, diarrhea, nausea and vomiting.   Genitourinary:  Negative for dysuria and frequency.   Musculoskeletal:  Negative for arthralgias and back pain.   Skin:  Negative for rash and wound.   Neurological:  Negative for weakness and headaches.   Hematological:  Negative for adenopathy.   All other systems reviewed and are negative.       Physical Exam  Vitals and nursing note reviewed.   Constitutional:       Appearance: She is well-developed.   HENT:      Head: Normocephalic and atraumatic.      Right Ear: Tympanic membrane is retracted.      Left Ear: Tympanic membrane is retracted.      Nose: Mucosal edema and congestion present.   Eyes:      Pupils: Pupils are equal, round, and reactive to light.   Cardiovascular:      Rate and Rhythm: Normal rate and regular rhythm.      Heart sounds: Normal heart sounds. No murmur heard.  Pulmonary:      Effort: Pulmonary effort is normal. No respiratory distress.      Breath sounds: Normal breath sounds. No wheezing or rales.   Abdominal:      General: Bowel sounds are normal.      Palpations: Abdomen is soft.      Tenderness: There is no abdominal tenderness.  There is no guarding or rebound.   Musculoskeletal:      Cervical back: Normal range of motion and neck supple.   Skin:     General: Skin is warm and dry.   Neurological:      Mental Status: She is alert and oriented to person, place, and time.      Cranial Nerves: No cranial nerve deficit.      Coordination: Coordination normal.          Procedures     MDM          --------------------------------------------- PAST HISTORY ---------------------------------------------  Past Medical History:  has a past medical history of Arthritis, COVID, Hypertension, Iron deficiency anemia due to chronic blood loss, Iron deficiency anemia due to chronic blood loss, Obesity (BMI 35.0-39.9 without comorbidity), and Primary hypertension.    Past Surgical History:  has a past surgical history that includes Abdomen surgery; [place] tooth extraction;  section (,,,,); Upper gastrointestinal endoscopy (2016); Shannon-en-Y Gastric Bypass (2017); Upper gastrointestinal endoscopy (N/A, 10/9/2020); hiatal hernia repair (N/A, 2020); Dilation and curettage of uterus (N/A, 2021); Upper gastrointestinal endoscopy (N/A, 2023); and laparoscopy (N/A, 10/24/2023).    Social History:  reports that she quit smoking about 13 years ago. Her smoking use included cigarettes. She has never used smokeless tobacco. She reports that she does not drink alcohol and does not use drugs.    Family History: family history includes Hearing Loss in her paternal cousin; Heart Disease in her mother; No Known Problems in her brother.     The patient’s home medications have been reviewed.    Allergies: Ibuprofen    -------------------------------------------------- RESULTS -------------------------------------------------  Labs:  No results found for this visit on 25.    Radiology:  No orders to display       ------------------------- NURSING NOTES AND VITALS REVIEWED ---------------------------  Date / Time

## 2025-03-13 ENCOUNTER — HOSPITAL ENCOUNTER (EMERGENCY)
Age: 45
Discharge: HOME | End: 2025-03-13
Attending: FAMILY MEDICINE
Payer: COMMERCIAL

## 2025-03-13 VITALS
TEMPERATURE: 98.1 F | HEART RATE: 80 BPM | RESPIRATION RATE: 16 BRPM | SYSTOLIC BLOOD PRESSURE: 160 MMHG | DIASTOLIC BLOOD PRESSURE: 98 MMHG | OXYGEN SATURATION: 100 %

## 2025-03-13 DIAGNOSIS — K02.9 PAIN DUE TO DENTAL CARIES: Primary | ICD-10-CM

## 2025-03-13 PROCEDURE — 99283 EMERGENCY DEPT VISIT LOW MDM: CPT

## 2025-03-13 RX ORDER — AMOXICILLIN 500 MG/1
500 CAPSULE ORAL 3 TIMES DAILY
Qty: 30 CAPSULE | Refills: 0 | Status: SHIPPED | OUTPATIENT
Start: 2025-03-13 | End: 2025-03-23

## 2025-03-13 RX ORDER — LIDOCAINE HYDROCHLORIDE 20 MG/ML
SOLUTION OROPHARYNGEAL
Qty: 100 ML | Refills: 0 | Status: SHIPPED | OUTPATIENT
Start: 2025-03-13

## 2025-03-13 ASSESSMENT — PAIN DESCRIPTION - LOCATION: LOCATION: TEETH

## 2025-03-13 ASSESSMENT — PAIN - FUNCTIONAL ASSESSMENT
PAIN_FUNCTIONAL_ASSESSMENT: ACTIVITIES ARE NOT PREVENTED
PAIN_FUNCTIONAL_ASSESSMENT: 0-10

## 2025-03-13 ASSESSMENT — PAIN DESCRIPTION - DESCRIPTORS: DESCRIPTORS: ACHING;THROBBING

## 2025-03-13 ASSESSMENT — PAIN SCALES - GENERAL: PAINLEVEL_OUTOF10: 10

## 2025-03-13 ASSESSMENT — PAIN DESCRIPTION - ORIENTATION: ORIENTATION: LEFT

## 2025-03-13 ASSESSMENT — PAIN DESCRIPTION - PAIN TYPE: TYPE: ACUTE PAIN

## 2025-03-13 NOTE — ED PROVIDER NOTES
HPI:  3/13/25,   Time: 12:43 PM EDT         Awa Voss is a 44 y.o. female presenting to the ED for 2-week history of left lower dental pain and gum seems to be worsening, she is has an appointment coming up with her dentist in about 2 weeks.  She thinks a piece of the tooth broke off.        ROS:   Pertinent positives and negatives are stated within HPI, all other systems reviewed and are negative.  --------------------------------------------- PAST HISTORY ---------------------------------------------  Past Medical History:  has a past medical history of Arthritis, COVID, Hypertension, Iron deficiency anemia due to chronic blood loss, Iron deficiency anemia due to chronic blood loss, Obesity (BMI 35.0-39.9 without comorbidity), and Primary hypertension.    Past Surgical History:  has a past surgical history that includes Abdomen surgery; [place] tooth extraction;  section (,,,,); Upper gastrointestinal endoscopy (2016); Shannon-en-Y Gastric Bypass (2017); Upper gastrointestinal endoscopy (N/A, 10/9/2020); hiatal hernia repair (N/A, 2020); Dilation and curettage of uterus (N/A, 2021); Upper gastrointestinal endoscopy (N/A, 2023); and laparoscopy (N/A, 10/24/2023).    Social History:  reports that she quit smoking about 13 years ago. Her smoking use included cigarettes. She has never used smokeless tobacco. She reports that she does not drink alcohol and does not use drugs.    Family History: family history includes Hearing Loss in her paternal cousin; Heart Disease in her mother; No Known Problems in her brother.     The patient’s home medications have been reviewed.    Allergies: Ibuprofen    -------------------------------------------------- RESULTS -------------------------------------------------  All laboratory and radiology results have been personally reviewed by myself   LABS:  No results found for this visit on 25.    RADIOLOGY:  Interpreted

## 2025-06-01 ENCOUNTER — HOSPITAL ENCOUNTER (EMERGENCY)
Age: 45
Discharge: HOME | End: 2025-06-01
Attending: FAMILY MEDICINE
Payer: COMMERCIAL

## 2025-06-01 VITALS
HEIGHT: 64 IN | HEART RATE: 84 BPM | BODY MASS INDEX: 37.56 KG/M2 | RESPIRATION RATE: 16 BRPM | OXYGEN SATURATION: 100 % | SYSTOLIC BLOOD PRESSURE: 155 MMHG | WEIGHT: 220 LBS | DIASTOLIC BLOOD PRESSURE: 100 MMHG | TEMPERATURE: 98.8 F

## 2025-06-01 DIAGNOSIS — M54.50 ACUTE BILATERAL LOW BACK PAIN WITHOUT SCIATICA: Primary | ICD-10-CM

## 2025-06-01 PROCEDURE — 99284 EMERGENCY DEPT VISIT MOD MDM: CPT

## 2025-06-01 PROCEDURE — 6360000002 HC RX W HCPCS: Performed by: FAMILY MEDICINE

## 2025-06-01 PROCEDURE — 96372 THER/PROPH/DIAG INJ SC/IM: CPT

## 2025-06-01 RX ORDER — FLUCONAZOLE 150 MG/1
150 TABLET ORAL ONCE
Qty: 1 TABLET | Refills: 0 | Status: SHIPPED | OUTPATIENT
Start: 2025-06-01 | End: 2025-06-01

## 2025-06-01 RX ORDER — DEXAMETHASONE SODIUM PHOSPHATE 10 MG/ML
10 INJECTION, SOLUTION INTRAMUSCULAR; INTRAVENOUS ONCE
Status: COMPLETED | OUTPATIENT
Start: 2025-06-01 | End: 2025-06-01

## 2025-06-01 RX ADMIN — DEXAMETHASONE SODIUM PHOSPHATE 10 MG: 10 INJECTION, SOLUTION INTRAMUSCULAR; INTRAVENOUS at 14:27

## 2025-06-01 ASSESSMENT — PAIN DESCRIPTION - DESCRIPTORS: DESCRIPTORS: ACHING

## 2025-06-01 ASSESSMENT — PAIN - FUNCTIONAL ASSESSMENT: PAIN_FUNCTIONAL_ASSESSMENT: 0-10

## 2025-06-01 ASSESSMENT — PAIN DESCRIPTION - LOCATION: LOCATION: GENERALIZED

## 2025-06-01 ASSESSMENT — PAIN DESCRIPTION - PAIN TYPE: TYPE: ACUTE PAIN

## 2025-06-01 ASSESSMENT — PAIN DESCRIPTION - FREQUENCY: FREQUENCY: CONTINUOUS

## 2025-06-01 ASSESSMENT — PAIN SCALES - GENERAL: PAINLEVEL_OUTOF10: 9

## 2025-06-01 NOTE — ED PROVIDER NOTES
Urgent Care Encounter       Patient: Awa Voss  MRN: [id number]  : 1980  Date of Evaluation: 2025  ED Provider: Christiana Andrews MD    Chief Complaint       Chief Complaint   Patient presents with    Generalized Body Aches     Chills for the past couple of days. History of arthritis.     [place]     Awa Voss is a 44 y.o. female who presents to the [facility] (Urgent care Facility)    Patient feels pain and discomfort in lower back region, pain does NOT radiate to both legs bilaterally or unilaterally hence not exhibiting a radicular nature, no numbness or tingling or loss of muscle strength or weakness, states intermittent in characteristic, waxes and wanes in nature, no fevers, no weight loss, no bowel or bladder dysfunction, no morning stiffness, distribution of the pain is local in the lower lumbar area. Patient does not have history of cancer, intravenous drug abuse, recent history of any spinal procedures or recent trauma.     On physical exam patient has no abnormalities with posture or gait. There is some mild tenderness on palpation of the spinous process and paraspinal musculature of the lower lumbar regions like L4-5, L5-S1. Lower muscle strength and sensation in both bilateral thigh and leg muscles is 5/5.      I have prescribed pharmacotherapy, if patient worsens to follow up for imaging and further work up with PCP.    Patient also states she gets recurrent vaginitis on and off and that she has white discharge vaginal, and that every time she has the discharge it is a yeast infection I would like to get treated for the yeast infection.  She is pretty very confident that she does not have an STD and does not want to be treated for an STD.        ROS:     At least 5 systems reviewed and otherwise acutely negative except as in the [place].  Review of Systems      Past History     Past Medical History:   Diagnosis Date    Arthritis     COVID     SOB ever

## 2025-06-11 ENCOUNTER — TELEPHONE (OUTPATIENT)
Age: 45
End: 2025-06-11

## 2025-06-11 NOTE — TELEPHONE ENCOUNTER
Called pt and left vm to inform her that she is due for a yearly visit in our office. Letter was also sent at this time

## 2025-06-30 ENCOUNTER — OFFICE VISIT (OUTPATIENT)
Dept: ORTHOPEDIC SURGERY | Age: 45
End: 2025-06-30
Payer: COMMERCIAL

## 2025-06-30 VITALS — WEIGHT: 220 LBS | TEMPERATURE: 98.6 F | HEIGHT: 64 IN | BODY MASS INDEX: 37.56 KG/M2

## 2025-06-30 DIAGNOSIS — M25.811 IMPINGEMENT OF RIGHT SHOULDER: ICD-10-CM

## 2025-06-30 DIAGNOSIS — M75.21 TENDONITIS OF LONG HEAD OF BICEPS BRACHII OF RIGHT SHOULDER: Primary | ICD-10-CM

## 2025-06-30 PROCEDURE — 99213 OFFICE O/P EST LOW 20 MIN: CPT | Performed by: ORTHOPAEDIC SURGERY

## (undated) DEVICE — COVER,LIGHT HANDLE,FLX,1/PK: Brand: MEDLINE INDUSTRIES, INC.

## (undated) DEVICE — GARMENT,MEDLINE,DVT,INT,CALF,MED, GEN2: Brand: MEDLINE

## (undated) DEVICE — MARKER,SKIN,WI/RULER AND LABELS: Brand: MEDLINE

## (undated) DEVICE — 6 X 9  1.75MIL 4-WALL LABGUARD: Brand: MINIGRIP COMMERCIAL LLC

## (undated) DEVICE — KIT BEDSIDE REVITAL OX 500ML

## (undated) DEVICE — SYRINGE MED 10ML TRNSLUC BRL PLUNG BLK MRK POLYPR CTRL

## (undated) DEVICE — 4-PORT MANIFOLD: Brand: NEPTUNE 2

## (undated) DEVICE — GLOVE ORANGE PI 8   MSG9080

## (undated) DEVICE — LARGE BORE STOPCOCK WITH ROTATING MALE LUER LOCK

## (undated) DEVICE — SUTURE DEV SZ 0 L6IN N ABSORBABLE

## (undated) DEVICE — PAD,NON-ADHERENT,3X8,STERILE,LF,1/PK: Brand: MEDLINE

## (undated) DEVICE — FORCEPS BX L240CM JAW DIA2.8MM L CAP W/ NDL MIC MESH TOOTH

## (undated) DEVICE — ADAPTER CLEANING PORPOISE CLEANING

## (undated) DEVICE — GOWN,SIRUS,NONRNF,SETINSLV,XL,20/CS: Brand: MEDLINE

## (undated) DEVICE — Device

## (undated) DEVICE — TROCAR: Brand: KII SLEEVE

## (undated) DEVICE — LIQUIBAND RAPID ADHESIVE 36/CS 0.8ML: Brand: MEDLINE

## (undated) DEVICE — PACK SURG LAP CHOLE CUSTOM

## (undated) DEVICE — KIT,ANTI FOG,W/SPONGE & FLUID,SOFT PACK: Brand: MEDLINE

## (undated) DEVICE — SPONGE GZ 4IN 4IN 4 PLY N WVN AVANT

## (undated) DEVICE — WIPES SKIN CLOTH READYPREP 9 X 10.5 IN 2% CHLORHEX GLUCONATE CHG PREOP

## (undated) DEVICE — TROCAR: Brand: KII FIOS FIRST ENTRY

## (undated) DEVICE — NEEDLE HYPO 25GA L1.5IN BLU POLYPR HUB S STL REG BVL STR

## (undated) DEVICE — ELECTRODE PT RET AD L9FT HI MOIST COND ADH HYDRGEL CORDED

## (undated) DEVICE — TUBING, SUCTION, 1/4" X 10', STRAIGHT: Brand: MEDLINE

## (undated) DEVICE — GOWN ISOLATN REG YEL M WT MULTIPLY SIDETIE LEV 2

## (undated) DEVICE — APPLICATOR MEDICATED 26 CC SOLUTION HI LT ORNG CHLORAPREP

## (undated) DEVICE — TRAY PROCED DILATATION CURETTAGE

## (undated) DEVICE — [HIGH FLOW INSUFFLATOR,  DO NOT USE IF PACKAGE IS DAMAGED,  KEEP DRY,  KEEP AWAY FROM SUNLIGHT,  PROTECT FROM HEAT AND RADIOACTIVE SOURCES.]: Brand: PNEUMOSURE

## (undated) DEVICE — CATHETER,URETHRAL,VINYL,MALE,16",16 FR: Brand: MEDLINE

## (undated) DEVICE — CONTAINER SPEC 480ML CLR POLYSTYR 10% NEUT BUFF FRMLN ZN

## (undated) DEVICE — SYRINGE MED 10ML LUERLOCK TIP W/O SFTY DISP

## (undated) DEVICE — DOUBLE BASIN SET: Brand: MEDLINE INDUSTRIES, INC.

## (undated) DEVICE — GLOVE ORANGE PI 7 1/2   MSG9075

## (undated) DEVICE — GAUZE,SPONGE,2"X2",8PLY,STERILE,LF,2'S: Brand: MEDLINE

## (undated) DEVICE — YANKAUER,BULB TIP,W/O VENT,RIGID,STERILE: Brand: MEDLINE

## (undated) DEVICE — INSUFFLATION NEEDLE TO ESTABLISH PNEUMOPERITONEUM.: Brand: INSUFFLATION NEEDLE

## (undated) DEVICE — TOWEL,OR,DSP,ST,BLUE,STD,6/PK,12PK/CS: Brand: MEDLINE

## (undated) DEVICE — BLADE ES ELASTOMERIC COAT INSUL DURABLE BEND UPTO 90DEG

## (undated) DEVICE — CAMERA STRYKER 1488 HD GEN

## (undated) DEVICE — SPECIMEN SOCK - STANDARD: Brand: MEDI-VAC

## (undated) DEVICE — VALVE SUCTION AIR H2O HYDR H2O JET CONN STRL ORCA POD + DISP

## (undated) DEVICE — MEDI-VAC YANKAUER SUCTION HANDLE W/BULBOUS TIP: Brand: CARDINAL HEALTH

## (undated) DEVICE — LAPAROSCOPIC SCISSORS: Brand: EPIX LAPAROSCOPIC SCISSORS

## (undated) DEVICE — KENDALL 450 SERIES MONITORING FOAM ELECTRODE - RECTANGULAR SHAPE ( 3/PK): Brand: KENDALL

## (undated) DEVICE — CONTAINER SPEC COLL 960ML POLYPR TRIANG GRAD INTAKE/OUTPUT

## (undated) DEVICE — GAUZE,SPONGE,4"X4",16PLY,XRAY,STRL,LF: Brand: MEDLINE

## (undated) DEVICE — BLOCK BITE 60FR CAREGUARD

## (undated) DEVICE — SUTURE ABSRB L6IN L37MM 0 GS-21 GRN 1/2 CIR TAPR PNT NDL VLOCL0306

## (undated) DEVICE — SEALER/DIVIDER LAP SHFT L44CM DIA5MM STR BLNT TIP JAW HND

## (undated) DEVICE — PLUMEPORT LAPAROSCOPIC SMOKE FILTRATION DEVICE: Brand: PLUMEPORT ACTIV

## (undated) DEVICE — SYRINGE 20ML LL S/C 50

## (undated) DEVICE — SYRINGE MED 50ML LUERLOCK TIP

## (undated) DEVICE — LAPAROSCOPIC WIRE L HK 45 CM

## (undated) DEVICE — TRAY,VAG PREP,2PR VNYL GLV,4 C: Brand: MEDLINE INDUSTRIES, INC.

## (undated) DEVICE — PMI PTFE COATED LAPAROSCOPIC WIRE L-HOOK 44 CM: Brand: PMI

## (undated) DEVICE — TRAY PROCED HYSTEROSCOPY CIRCON 1

## (undated) DEVICE — CLOTH SURG PREP PREOPERATIVE CHLORHEXIDINE GLUC 2% READYPREP

## (undated) DEVICE — LUBRICANT SURG JELLY ST BACTER TUBE 4.25OZ

## (undated) DEVICE — MASK,FACE,MAXFLUIDPROTECT,SHIELD/ERLPS: Brand: MEDLINE

## (undated) DEVICE — Device: Brand: DEFENDO VALVE AND CONNECTOR KIT

## (undated) DEVICE — MEDI-VAC NON-CONDUCTIVE SUCTION TUBING: Brand: CARDINAL HEALTH

## (undated) DEVICE — PLUMEPORT ACTIV LAPAROSCOPIC SMOKE FILTRATION DEVICE: Brand: PLUMEPORT ACTIVE

## (undated) DEVICE — BLADE,STAINLESS-STEEL,11,STRL,DISPOSABLE: Brand: MEDLINE

## (undated) DEVICE — LENS CORD GYN 0-DEG 5 MM CIRCON

## (undated) DEVICE — GLOVE ORANGE PI 7   MSG9070

## (undated) DEVICE — BASIC DOUBLE BASIN 2-LF: Brand: MEDLINE INDUSTRIES, INC.

## (undated) DEVICE — KIT EXTN LN IV CONDUCT FLUIDS FOR GRAVITATIONAL IV ADMIN SGL

## (undated) DEVICE — PAD MATERNITY CURITY ADH STRIP DISP

## (undated) DEVICE — SET ENDO INSTR LAPAROSCOPIC INCISIONAL

## (undated) DEVICE — Z INACTIVE USE 2660664 SOLUTION IRRIG 3000ML 0.9% SOD CHL USP UROMATIC PLAS CONT

## (undated) DEVICE — PACK PROC 3IN1 W/ L12FT DIA0.25IN REINF SUCT TBNG W50XL901IN

## (undated) DEVICE — GOWN,SIRUS,POLYRNF,BRTHSLV,XLN/XL,20/CS: Brand: MEDLINE